# Patient Record
Sex: MALE | Race: WHITE | NOT HISPANIC OR LATINO | Employment: UNEMPLOYED | URBAN - NONMETROPOLITAN AREA
[De-identification: names, ages, dates, MRNs, and addresses within clinical notes are randomized per-mention and may not be internally consistent; named-entity substitution may affect disease eponyms.]

---

## 2017-11-30 ENCOUNTER — TRANSCRIBE ORDERS (OUTPATIENT)
Dept: ADMINISTRATIVE | Facility: HOSPITAL | Age: 42
End: 2017-11-30

## 2017-11-30 DIAGNOSIS — G89.29 CHRONIC BILATERAL LOW BACK PAIN WITH BILATERAL SCIATICA: Primary | ICD-10-CM

## 2017-11-30 DIAGNOSIS — M54.42 CHRONIC BILATERAL LOW BACK PAIN WITH BILATERAL SCIATICA: Primary | ICD-10-CM

## 2017-11-30 DIAGNOSIS — M54.41 CHRONIC BILATERAL LOW BACK PAIN WITH BILATERAL SCIATICA: Primary | ICD-10-CM

## 2017-12-05 ENCOUNTER — HOSPITAL ENCOUNTER (OUTPATIENT)
Dept: MRI IMAGING | Facility: HOSPITAL | Age: 42
Discharge: HOME | End: 2017-12-05
Admitting: NURSE PRACTITIONER

## 2017-12-05 DIAGNOSIS — M54.41 CHRONIC BILATERAL LOW BACK PAIN WITH BILATERAL SCIATICA: ICD-10-CM

## 2017-12-05 DIAGNOSIS — M54.42 CHRONIC BILATERAL LOW BACK PAIN WITH BILATERAL SCIATICA: ICD-10-CM

## 2017-12-05 DIAGNOSIS — G89.29 CHRONIC BILATERAL LOW BACK PAIN WITH BILATERAL SCIATICA: ICD-10-CM

## 2017-12-05 PROCEDURE — 72148 MRI LUMBAR SPINE W/O DYE: CPT

## 2017-12-14 ENCOUNTER — HOSPITAL ENCOUNTER (EMERGENCY)
Facility: HOSPITAL | Age: 42
Discharge: HOME | End: 2017-12-14
Attending: EMERGENCY MEDICINE | Admitting: EMERGENCY MEDICINE

## 2017-12-14 VITALS
DIASTOLIC BLOOD PRESSURE: 80 MMHG | BODY MASS INDEX: 37.25 KG/M2 | SYSTOLIC BLOOD PRESSURE: 120 MMHG | HEIGHT: 72 IN | TEMPERATURE: 98.1 F | WEIGHT: 275 LBS | HEART RATE: 92 BPM | RESPIRATION RATE: 14 BRPM | OXYGEN SATURATION: 95 %

## 2017-12-14 DIAGNOSIS — E11.9 NON-INSULIN DEPENDENT TYPE 2 DIABETES MELLITUS (HCC): ICD-10-CM

## 2017-12-14 DIAGNOSIS — L84 CALLUS OF FOOT: ICD-10-CM

## 2017-12-14 DIAGNOSIS — S91.102A UNSPECIFIED OPEN WOUND OF LEFT GREAT TOE WITHOUT DAMAGE TO NAIL, INITIAL ENCOUNTER: Primary | ICD-10-CM

## 2017-12-14 LAB — GLUCOSE BLDC GLUCOMTR-MCNC: 240 MG/DL (ref 70–130)

## 2017-12-14 PROCEDURE — 63710000001 INSULIN REGULAR HUMAN PER 5 UNITS: Performed by: PHYSICIAN ASSISTANT

## 2017-12-14 PROCEDURE — 99283 EMERGENCY DEPT VISIT LOW MDM: CPT

## 2017-12-14 PROCEDURE — 82962 GLUCOSE BLOOD TEST: CPT

## 2017-12-14 RX ORDER — ONDANSETRON 4 MG/1
4 TABLET, ORALLY DISINTEGRATING ORAL ONCE
Status: COMPLETED | OUTPATIENT
Start: 2017-12-14 | End: 2017-12-14

## 2017-12-14 RX ORDER — ONDANSETRON 4 MG/1
4 TABLET, ORALLY DISINTEGRATING ORAL EVERY 8 HOURS PRN
Qty: 8 TABLET | Refills: 0 | Status: SHIPPED | OUTPATIENT
Start: 2017-12-14 | End: 2019-04-25

## 2017-12-14 RX ORDER — SENNOSIDES 8.6 MG
650 CAPSULE ORAL EVERY 8 HOURS PRN
Qty: 12 TABLET | Refills: 0 | Status: SHIPPED | OUTPATIENT
Start: 2017-12-14 | End: 2019-04-25

## 2017-12-14 RX ORDER — CLINDAMYCIN HYDROCHLORIDE 150 MG/1
600 CAPSULE ORAL ONCE
Status: COMPLETED | OUTPATIENT
Start: 2017-12-14 | End: 2017-12-14

## 2017-12-14 RX ORDER — IBUPROFEN 600 MG/1
600 TABLET ORAL EVERY 8 HOURS PRN
Qty: 12 TABLET | Refills: 0 | Status: ON HOLD | OUTPATIENT
Start: 2017-12-14 | End: 2018-06-03

## 2017-12-14 RX ORDER — CLINDAMYCIN HYDROCHLORIDE 300 MG/1
300 CAPSULE ORAL 4 TIMES DAILY
Qty: 28 CAPSULE | Refills: 0 | Status: SHIPPED | OUTPATIENT
Start: 2017-12-14 | End: 2017-12-21

## 2017-12-14 RX ORDER — ACETAMINOPHEN 325 MG/1
650 TABLET ORAL ONCE
Status: COMPLETED | OUTPATIENT
Start: 2017-12-14 | End: 2017-12-14

## 2017-12-14 RX ADMIN — ONDANSETRON 4 MG: 4 TABLET, ORALLY DISINTEGRATING ORAL at 21:06

## 2017-12-14 RX ADMIN — HUMAN INSULIN 6 UNITS: 100 INJECTION, SOLUTION SUBCUTANEOUS at 21:06

## 2017-12-14 RX ADMIN — ACETAMINOPHEN 650 MG: 325 TABLET, FILM COATED ORAL at 21:06

## 2017-12-14 RX ADMIN — CLINDAMYCIN HYDROCHLORIDE 600 MG: 150 CAPSULE ORAL at 21:06

## 2017-12-15 NOTE — ED PROVIDER NOTES
Subjective   HPI Comments: Patient is here with complaint of skin irritation left great toe, patient has had some callus formation to the skin and he scraped it off last night with knife,.. Patient is diabetic he denies fevers chills patient has had similar symptoms to this extremity in the past and followed by podiatry who has trimmed some of the callus formation previously  Patient concerned about possible infection presents here for evaluation      Review of Systems   Constitutional: Negative.    HENT: Negative.    Eyes: Negative.    Respiratory: Negative.    Cardiovascular: Negative.    Gastrointestinal: Negative.    Musculoskeletal: Negative.    Skin: Positive for wound.   Neurological: Negative.    Psychiatric/Behavioral: Negative.    All other systems reviewed and are negative.      Past Medical History:   Diagnosis Date   • Depression    • Diabetes mellitus    • Hypertension    • Injury of back        No Known Allergies    Past Surgical History:   Procedure Laterality Date   • APPENDECTOMY     • BACK SURGERY     • CHOLECYSTECTOMY     • SCROTAL SURGERY         History reviewed. No pertinent family history.    Social History     Social History   • Marital status:      Spouse name: N/A   • Number of children: N/A   • Years of education: N/A     Social History Main Topics   • Smoking status: Current Every Day Smoker     Packs/day: 1.00     Types: Cigarettes   • Smokeless tobacco: None   • Alcohol use No   • Drug use: No   • Sexual activity: Not Asked     Other Topics Concern   • None     Social History Narrative   • None           Objective   Physical Exam   Constitutional: He is oriented to person, place, and time. He appears well-developed and well-nourished.   Afebrile nontoxic well-appearing no acute distress   HENT:   Head: Normocephalic.   Eyes: EOM are normal. Pupils are equal, round, and reactive to light.   Neck: Normal range of motion.   Cardiovascular: Regular rhythm and intact distal pulses.     Pulmonary/Chest: Effort normal and breath sounds normal.   Musculoskeletal: Normal range of motion. He exhibits no edema.   Neurological: He is alert and oriented to person, place, and time. No cranial nerve deficit. Coordination normal.   Skin: Skin is warm and dry.   Some localized callus to left great toe with abrasion epidermal layer approximately 2 cm no evidence of infection noted tonight no proximal streaking no edema.. Less than 2 second capillary refill patient moves extremity without hesitation neurovascularly intact   Psychiatric: His behavior is normal. Judgment and thought content normal.   Nursing note and vitals reviewed.      Procedures         ED Course  ED Course   Comment By Time   Have discussed with patient to keep track of his blood sugars,.. Adjust medications accordingly... Will advise to follow-up with podiatry start empiric antibiotics patient advised to return here if he develops fever redness streaking any worsening symptoms despite antibiotics Ethan Winter PA-C 12/14 2038                  [facility]    Final diagnoses:   Unspecified open wound of left great toe without damage to nail, initial encounter   Non-insulin dependent type 2 diabetes mellitus   Callus of foot            Ethan Winter PA-C  12/14/17 2221

## 2018-01-01 ENCOUNTER — APPOINTMENT (OUTPATIENT)
Dept: GENERAL RADIOLOGY | Facility: HOSPITAL | Age: 43
End: 2018-01-01

## 2018-01-01 ENCOUNTER — HOSPITAL ENCOUNTER (EMERGENCY)
Facility: HOSPITAL | Age: 43
Discharge: HOME | End: 2018-01-01
Attending: EMERGENCY MEDICINE | Admitting: EMERGENCY MEDICINE

## 2018-01-01 VITALS
BODY MASS INDEX: 37.25 KG/M2 | HEART RATE: 92 BPM | RESPIRATION RATE: 18 BRPM | HEIGHT: 72 IN | TEMPERATURE: 98.5 F | SYSTOLIC BLOOD PRESSURE: 115 MMHG | DIASTOLIC BLOOD PRESSURE: 78 MMHG | OXYGEN SATURATION: 98 % | WEIGHT: 275 LBS

## 2018-01-01 DIAGNOSIS — L03.032 CELLULITIS OF TOE OF LEFT FOOT: Primary | ICD-10-CM

## 2018-01-01 DIAGNOSIS — L03.116 CELLULITIS OF FOOT, LEFT: ICD-10-CM

## 2018-01-01 LAB — GLUCOSE BLDC GLUCOMTR-MCNC: 286 MG/DL (ref 70–130)

## 2018-01-01 PROCEDURE — 82962 GLUCOSE BLOOD TEST: CPT

## 2018-01-01 PROCEDURE — 99283 EMERGENCY DEPT VISIT LOW MDM: CPT

## 2018-01-01 PROCEDURE — 73630 X-RAY EXAM OF FOOT: CPT

## 2018-01-01 RX ORDER — FUROSEMIDE 40 MG/1
40 TABLET ORAL DAILY
COMMUNITY
End: 2018-06-07 | Stop reason: HOSPADM

## 2018-01-01 RX ORDER — OXYCODONE AND ACETAMINOPHEN 10; 325 MG/1; MG/1
1 TABLET ORAL EVERY 6 HOURS PRN
Status: ON HOLD | COMMUNITY
End: 2018-06-04

## 2018-01-01 RX ORDER — BUPROPION HYDROCHLORIDE 150 MG/1
150 TABLET, EXTENDED RELEASE ORAL DAILY
Status: ON HOLD | COMMUNITY
End: 2018-06-03

## 2018-01-01 RX ORDER — DULOXETIN HYDROCHLORIDE 60 MG/1
60 CAPSULE, DELAYED RELEASE ORAL DAILY
COMMUNITY

## 2018-01-01 RX ORDER — LISINOPRIL 40 MG/1
40 TABLET ORAL DAILY
COMMUNITY

## 2018-01-01 RX ORDER — DOXYCYCLINE HYCLATE 100 MG/1
100 TABLET, DELAYED RELEASE ORAL 2 TIMES DAILY
Qty: 14 TABLET | Refills: 0 | Status: SHIPPED | OUTPATIENT
Start: 2018-01-01 | End: 2018-01-06 | Stop reason: HOSPADM

## 2018-01-01 RX ORDER — GLIMEPIRIDE 4 MG/1
1 TABLET ORAL 2 TIMES DAILY
COMMUNITY

## 2018-01-01 RX ORDER — HYDROCODONE BITARTRATE AND ACETAMINOPHEN 5; 325 MG/1; MG/1
1 TABLET ORAL ONCE
Status: COMPLETED | OUTPATIENT
Start: 2018-01-01 | End: 2018-01-01

## 2018-01-01 RX ORDER — PREGABALIN 100 MG/1
150 CAPSULE ORAL 3 TIMES DAILY
Status: ON HOLD | COMMUNITY
End: 2018-06-03

## 2018-01-01 RX ADMIN — HYDROCODONE BITARTRATE AND ACETAMINOPHEN 1 TABLET: 5; 325 TABLET ORAL at 10:35

## 2018-01-01 NOTE — ED PROVIDER NOTES
Subjective   Patient is a 42 y.o. male presenting with lower extremity pain.   History provided by:  Patient   used: No    Lower Extremity Issue   Location:  Foot  Injury: no    Foot location:  R foot  Pain details:     Quality:  Throbbing    Radiates to:  R leg    Severity:  Mild    Onset quality:  Sudden    Timing:  Intermittent    Progression:  Worsening  Chronicity:  New  Dislocation: no    Foreign body present:  No foreign bodies  Tetanus status:  Up to date  Prior injury to area:  No  Relieved by:  Nothing  Worsened by:  Nothing  Ineffective treatments:  None tried  Associated symptoms: swelling    Associated symptoms: no back pain, no fever and no neck pain        Review of Systems   Constitutional: Negative for chills and fever.   HENT: Negative for congestion, rhinorrhea, sore throat and trouble swallowing.    Eyes: Negative for discharge and visual disturbance.   Respiratory: Negative for cough, chest tightness, shortness of breath and wheezing.    Cardiovascular: Negative for chest pain, palpitations and leg swelling.   Gastrointestinal: Negative for abdominal pain, constipation, diarrhea, nausea and vomiting.   Genitourinary: Negative for dysuria, flank pain and hematuria.   Musculoskeletal: Negative for back pain, myalgias and neck pain.   Skin: Positive for wound. Negative for color change and rash.   Neurological: Negative for dizziness, weakness and numbness.   Psychiatric/Behavioral: Negative for self-injury and suicidal ideas.       Past Medical History:   Diagnosis Date   • Depression    • Diabetes mellitus    • Hypertension    • Injury of back        No Known Allergies    Past Surgical History:   Procedure Laterality Date   • APPENDECTOMY     • BACK SURGERY     • CHOLECYSTECTOMY     • SCROTAL SURGERY         History reviewed. No pertinent family history.    Social History     Social History   • Marital status:      Spouse name: N/A   • Number of children: N/A   • Years  of education: N/A     Social History Main Topics   • Smoking status: Current Every Day Smoker     Packs/day: 1.00     Types: Cigarettes   • Smokeless tobacco: None   • Alcohol use No   • Drug use: No   • Sexual activity: Not Asked     Other Topics Concern   • None     Social History Narrative           Objective   Physical Exam   Constitutional: He is oriented to person, place, and time. He appears well-developed and well-nourished.   HENT:   Head: Normocephalic and atraumatic.   Nose: Nose normal.   Mouth/Throat: Oropharynx is clear and moist.   Eyes: Conjunctivae and EOM are normal. Pupils are equal, round, and reactive to light.   Neck: Normal range of motion. Neck supple.   Cardiovascular: Normal rate, regular rhythm, normal heart sounds and intact distal pulses.    Pulmonary/Chest: Effort normal and breath sounds normal. No respiratory distress. He has no wheezes. He exhibits no tenderness.   Abdominal: Soft. Bowel sounds are normal. There is no tenderness. There is no rebound and no guarding.   Musculoskeletal: Normal range of motion. He exhibits no edema, tenderness or deformity.   Neurological: He is alert and oriented to person, place, and time. No cranial nerve deficit. Coordination normal.   Skin: Skin is warm and dry. No rash noted. No erythema. No pallor.   Cellulitis to the L big toe, L lateral foot, and L medial malleolus.   Psychiatric: He has a normal mood and affect. His behavior is normal. Judgment and thought content normal.   Nursing note and vitals reviewed.      Procedures         ED Course  ED Course                  MDM  Number of Diagnoses or Management Options  Cellulitis of foot, left:   Cellulitis of toe of left foot:   Diagnosis management comments: 42-year-old male presents emergency Department with complaints of cellulitis.  Patient states that he had a callus to his right big toe that he cut off and became infected.  He was seen in the emergency department a month ago and prescribed  antibiotics.  Patient states that initially got better and now it's looking infected again.  He states that this morning he woke up and noticed redness to his toe going to his foot and ankle.  On evaluation, patient has cellulitis to the L big toe and lateral left foot and medial malleolus. X-ray obtained.  Soft tissue swelling seen but no signs of osteomyelitis.  I will start the patient on doxycycline.  I will give the patient a referral to podiatry.  I told the patient to watch the erythema.  I told them that if the erythema continues to worsen to come back to the emergency department for admission.  Patient is agreeable with the plan of care.      Final diagnoses:   Cellulitis of toe of left foot   Cellulitis of foot, left            Steven Martínez MD  01/01/18 1123       Steven Martínez MD  01/20/18 0548

## 2018-01-02 ENCOUNTER — APPOINTMENT (OUTPATIENT)
Dept: MRI IMAGING | Facility: HOSPITAL | Age: 43
End: 2018-01-02

## 2018-01-02 ENCOUNTER — APPOINTMENT (OUTPATIENT)
Dept: ULTRASOUND IMAGING | Facility: HOSPITAL | Age: 43
End: 2018-01-02

## 2018-01-02 ENCOUNTER — HOSPITAL ENCOUNTER (INPATIENT)
Facility: HOSPITAL | Age: 43
LOS: 3 days | Discharge: HOME | End: 2018-01-06
Attending: STUDENT IN AN ORGANIZED HEALTH CARE EDUCATION/TRAINING PROGRAM | Admitting: INTERNAL MEDICINE

## 2018-01-02 DIAGNOSIS — E11.621 DIABETIC ULCER OF TOE OF LEFT FOOT ASSOCIATED WITH TYPE 2 DIABETES MELLITUS, UNSPECIFIED ULCER STAGE (HCC): Primary | ICD-10-CM

## 2018-01-02 DIAGNOSIS — L97.529 DIABETIC ULCER OF TOE OF LEFT FOOT ASSOCIATED WITH TYPE 2 DIABETES MELLITUS, UNSPECIFIED ULCER STAGE (HCC): Primary | ICD-10-CM

## 2018-01-02 DIAGNOSIS — L03.116 CELLULITIS OF LEFT LOWER EXTREMITY: ICD-10-CM

## 2018-01-02 LAB
ALBUMIN SERPL-MCNC: 4.3 G/DL (ref 3.5–5)
ALBUMIN/GLOB SERPL: 1.3 G/DL (ref 1–2)
ALP SERPL-CCNC: 116 U/L (ref 38–126)
ALT SERPL W P-5'-P-CCNC: 170 U/L (ref 13–69)
ANION GAP SERPL CALCULATED.3IONS-SCNC: 14.1 MMOL/L
AST SERPL-CCNC: 71 U/L (ref 15–46)
BASOPHILS # BLD AUTO: 0.07 10*3/MM3 (ref 0–0.2)
BASOPHILS NFR BLD AUTO: 0.5 % (ref 0–2.5)
BILIRUB SERPL-MCNC: 0.7 MG/DL (ref 0.2–1.3)
BUN BLD-MCNC: 13 MG/DL (ref 7–20)
BUN/CREAT SERPL: 16.3 (ref 6.3–21.9)
CALCIUM SPEC-SCNC: 9.4 MG/DL (ref 8.4–10.2)
CHLORIDE SERPL-SCNC: 98 MMOL/L (ref 98–107)
CO2 SERPL-SCNC: 25 MMOL/L (ref 26–30)
CREAT BLD-MCNC: 0.8 MG/DL (ref 0.6–1.3)
CRP SERPL-MCNC: 21.8 MG/DL (ref 0–1)
DEPRECATED RDW RBC AUTO: 40.2 FL (ref 37–54)
EOSINOPHIL # BLD AUTO: 0.22 10*3/MM3 (ref 0–0.7)
EOSINOPHIL NFR BLD AUTO: 1.6 % (ref 0–7)
ERYTHROCYTE [DISTWIDTH] IN BLOOD BY AUTOMATED COUNT: 12.2 % (ref 11.5–14.5)
ERYTHROCYTE [SEDIMENTATION RATE] IN BLOOD: 14 MM/HR (ref 0–15)
GFR SERPL CREATININE-BSD FRML MDRD: 107 ML/MIN/1.73
GLOBULIN UR ELPH-MCNC: 3.2 GM/DL
GLUCOSE BLD-MCNC: 390 MG/DL (ref 74–98)
GLUCOSE BLDC GLUCOMTR-MCNC: 151 MG/DL (ref 70–130)
GLUCOSE BLDC GLUCOMTR-MCNC: 323 MG/DL (ref 70–130)
HBA1C MFR BLD: 9.7 % (ref 3–6)
HCT VFR BLD AUTO: 48.8 % (ref 42–52)
HGB BLD-MCNC: 16.7 G/DL (ref 14–18)
HOLD SPECIMEN: NORMAL
HOLD SPECIMEN: NORMAL
IMM GRANULOCYTES # BLD: 0.04 10*3/MM3 (ref 0–0.06)
IMM GRANULOCYTES NFR BLD: 0.3 % (ref 0–0.6)
LYMPHOCYTES # BLD AUTO: 2.11 10*3/MM3 (ref 0.6–3.4)
LYMPHOCYTES NFR BLD AUTO: 15.6 % (ref 10–50)
MCH RBC QN AUTO: 30.9 PG (ref 27–31)
MCHC RBC AUTO-ENTMCNC: 34.2 G/DL (ref 30–37)
MCV RBC AUTO: 90.2 FL (ref 80–94)
MONOCYTES # BLD AUTO: 0.97 10*3/MM3 (ref 0–0.9)
MONOCYTES NFR BLD AUTO: 7.2 % (ref 0–12)
NEUTROPHILS # BLD AUTO: 10.13 10*3/MM3 (ref 2–6.9)
NEUTROPHILS NFR BLD AUTO: 74.8 % (ref 37–80)
NRBC BLD MANUAL-RTO: 0 /100 WBC (ref 0–0)
PLATELET # BLD AUTO: 215 10*3/MM3 (ref 130–400)
PMV BLD AUTO: 10 FL (ref 6–12)
POTASSIUM BLD-SCNC: 4.1 MMOL/L (ref 3.5–5.1)
PROT SERPL-MCNC: 7.5 G/DL (ref 6.3–8.2)
RBC # BLD AUTO: 5.41 10*6/MM3 (ref 4.7–6.1)
SODIUM BLD-SCNC: 133 MMOL/L (ref 137–145)
WBC NRBC COR # BLD: 13.54 10*3/MM3 (ref 4.8–10.8)
WHOLE BLOOD HOLD SPECIMEN: NORMAL
WHOLE BLOOD HOLD SPECIMEN: NORMAL

## 2018-01-02 PROCEDURE — 87081 CULTURE SCREEN ONLY: CPT | Performed by: INTERNAL MEDICINE

## 2018-01-02 PROCEDURE — 99222 1ST HOSP IP/OBS MODERATE 55: CPT | Performed by: PODIATRIST

## 2018-01-02 PROCEDURE — 86704 HEP B CORE ANTIBODY TOTAL: CPT | Performed by: INTERNAL MEDICINE

## 2018-01-02 PROCEDURE — 36415 COLL VENOUS BLD VENIPUNCTURE: CPT

## 2018-01-02 PROCEDURE — G0378 HOSPITAL OBSERVATION PER HR: HCPCS

## 2018-01-02 PROCEDURE — 86140 C-REACTIVE PROTEIN: CPT | Performed by: PHYSICIAN ASSISTANT

## 2018-01-02 PROCEDURE — 87181 SC STD AGAR DILUTION PER AGT: CPT | Performed by: INTERNAL MEDICINE

## 2018-01-02 PROCEDURE — 86707 HEPATITIS BE ANTIBODY: CPT | Performed by: INTERNAL MEDICINE

## 2018-01-02 PROCEDURE — 73718 MRI LOWER EXTREMITY W/O DYE: CPT

## 2018-01-02 PROCEDURE — 83036 HEMOGLOBIN GLYCOSYLATED A1C: CPT | Performed by: INTERNAL MEDICINE

## 2018-01-02 PROCEDURE — 82962 GLUCOSE BLOOD TEST: CPT

## 2018-01-02 PROCEDURE — 93005 ELECTROCARDIOGRAM TRACING: CPT | Performed by: INTERNAL MEDICINE

## 2018-01-02 PROCEDURE — 87070 CULTURE OTHR SPECIMN AEROBIC: CPT | Performed by: INTERNAL MEDICINE

## 2018-01-02 PROCEDURE — 63710000001 INSULIN ASPART PER 5 UNITS: Performed by: INTERNAL MEDICINE

## 2018-01-02 PROCEDURE — 85025 COMPLETE CBC W/AUTO DIFF WBC: CPT | Performed by: PHYSICIAN ASSISTANT

## 2018-01-02 PROCEDURE — 87186 SC STD MICRODIL/AGAR DIL: CPT | Performed by: INTERNAL MEDICINE

## 2018-01-02 PROCEDURE — 80074 ACUTE HEPATITIS PANEL: CPT | Performed by: INTERNAL MEDICINE

## 2018-01-02 PROCEDURE — 87077 CULTURE AEROBIC IDENTIFY: CPT | Performed by: INTERNAL MEDICINE

## 2018-01-02 PROCEDURE — 63710000001 INSULIN REGULAR HUMAN PER 5 UNITS: Performed by: PHYSICIAN ASSISTANT

## 2018-01-02 PROCEDURE — 25010000002 VANCOMYCIN PER 500 MG: Performed by: PHYSICIAN ASSISTANT

## 2018-01-02 PROCEDURE — 99220 PR INITIAL OBSERVATION CARE/DAY 70 MINUTES: CPT | Performed by: INTERNAL MEDICINE

## 2018-01-02 PROCEDURE — 25010000002 PIPERACILLIN SOD-TAZOBACTAM PER 1 G: Performed by: INTERNAL MEDICINE

## 2018-01-02 PROCEDURE — 25010000002 PIPERACILLIN SOD-TAZOBACTAM PER 1 G: Performed by: PHYSICIAN ASSISTANT

## 2018-01-02 PROCEDURE — 86706 HEP B SURFACE ANTIBODY: CPT | Performed by: INTERNAL MEDICINE

## 2018-01-02 PROCEDURE — 87350 HEPATITIS BE AG IA: CPT | Performed by: INTERNAL MEDICINE

## 2018-01-02 PROCEDURE — 87147 CULTURE TYPE IMMUNOLOGIC: CPT | Performed by: INTERNAL MEDICINE

## 2018-01-02 PROCEDURE — 85651 RBC SED RATE NONAUTOMATED: CPT | Performed by: PHYSICIAN ASSISTANT

## 2018-01-02 PROCEDURE — 80053 COMPREHEN METABOLIC PANEL: CPT | Performed by: PHYSICIAN ASSISTANT

## 2018-01-02 PROCEDURE — 25010000002 ENOXAPARIN PER 10 MG: Performed by: INTERNAL MEDICINE

## 2018-01-02 PROCEDURE — 99284 EMERGENCY DEPT VISIT MOD MDM: CPT

## 2018-01-02 RX ORDER — LISINOPRIL 20 MG/1
40 TABLET ORAL DAILY
Status: DISCONTINUED | OUTPATIENT
Start: 2018-01-02 | End: 2018-01-06 | Stop reason: HOSPADM

## 2018-01-02 RX ORDER — SODIUM CHLORIDE 0.9 % (FLUSH) 0.9 %
1-10 SYRINGE (ML) INJECTION AS NEEDED
Status: DISCONTINUED | OUTPATIENT
Start: 2018-01-02 | End: 2018-01-06 | Stop reason: HOSPADM

## 2018-01-02 RX ORDER — PREGABALIN 75 MG/1
150 CAPSULE ORAL 3 TIMES DAILY
Status: DISCONTINUED | OUTPATIENT
Start: 2018-01-02 | End: 2018-01-06 | Stop reason: HOSPADM

## 2018-01-02 RX ORDER — BUPROPION HYDROCHLORIDE 150 MG/1
150 TABLET, EXTENDED RELEASE ORAL DAILY
Status: DISCONTINUED | OUTPATIENT
Start: 2018-01-03 | End: 2018-01-06 | Stop reason: HOSPADM

## 2018-01-02 RX ORDER — ONDANSETRON 4 MG/1
4 TABLET, ORALLY DISINTEGRATING ORAL EVERY 8 HOURS PRN
Status: DISCONTINUED | OUTPATIENT
Start: 2018-01-02 | End: 2018-01-06 | Stop reason: HOSPADM

## 2018-01-02 RX ORDER — ACETAMINOPHEN 325 MG/1
650 TABLET ORAL EVERY 6 HOURS PRN
Status: DISCONTINUED | OUTPATIENT
Start: 2018-01-02 | End: 2018-01-06 | Stop reason: HOSPADM

## 2018-01-02 RX ORDER — OXYCODONE AND ACETAMINOPHEN 10; 325 MG/1; MG/1
1 TABLET ORAL EVERY 4 HOURS PRN
Status: DISCONTINUED | OUTPATIENT
Start: 2018-01-02 | End: 2018-01-06 | Stop reason: HOSPADM

## 2018-01-02 RX ORDER — DULOXETIN HYDROCHLORIDE 30 MG/1
60 CAPSULE, DELAYED RELEASE ORAL DAILY
Status: DISCONTINUED | OUTPATIENT
Start: 2018-01-03 | End: 2018-01-06 | Stop reason: HOSPADM

## 2018-01-02 RX ORDER — HYDROCODONE BITARTRATE AND ACETAMINOPHEN 5; 325 MG/1; MG/1
1 TABLET ORAL ONCE
Status: COMPLETED | OUTPATIENT
Start: 2018-01-02 | End: 2018-01-02

## 2018-01-02 RX ORDER — SODIUM CHLORIDE, SODIUM LACTATE, POTASSIUM CHLORIDE, CALCIUM CHLORIDE 600; 310; 30; 20 MG/100ML; MG/100ML; MG/100ML; MG/100ML
100 INJECTION, SOLUTION INTRAVENOUS CONTINUOUS
Status: CANCELLED | OUTPATIENT
Start: 2018-01-02

## 2018-01-02 RX ORDER — SODIUM CHLORIDE 0.9 % (FLUSH) 0.9 %
1-10 SYRINGE (ML) INJECTION AS NEEDED
Status: CANCELLED | OUTPATIENT
Start: 2018-01-02

## 2018-01-02 RX ORDER — GLIPIZIDE 10 MG/1
10 TABLET ORAL
Status: DISCONTINUED | OUTPATIENT
Start: 2018-01-03 | End: 2018-01-06 | Stop reason: HOSPADM

## 2018-01-02 RX ORDER — DEXTROSE MONOHYDRATE 25 G/50ML
25 INJECTION, SOLUTION INTRAVENOUS
Status: DISCONTINUED | OUTPATIENT
Start: 2018-01-02 | End: 2018-01-06 | Stop reason: HOSPADM

## 2018-01-02 RX ORDER — NICOTINE POLACRILEX 4 MG
1 LOZENGE BUCCAL
Status: DISCONTINUED | OUTPATIENT
Start: 2018-01-02 | End: 2018-01-06 | Stop reason: HOSPADM

## 2018-01-02 RX ORDER — ONDANSETRON 2 MG/ML
4 INJECTION INTRAMUSCULAR; INTRAVENOUS EVERY 6 HOURS PRN
Status: DISCONTINUED | OUTPATIENT
Start: 2018-01-02 | End: 2018-01-06 | Stop reason: HOSPADM

## 2018-01-02 RX ORDER — FUROSEMIDE 40 MG/1
40 TABLET ORAL DAILY
Status: DISCONTINUED | OUTPATIENT
Start: 2018-01-02 | End: 2018-01-06 | Stop reason: HOSPADM

## 2018-01-02 RX ORDER — SODIUM CHLORIDE 0.9 % (FLUSH) 0.9 %
10 SYRINGE (ML) INJECTION AS NEEDED
Status: DISCONTINUED | OUTPATIENT
Start: 2018-01-02 | End: 2018-01-06 | Stop reason: HOSPADM

## 2018-01-02 RX ADMIN — METFORMIN HYDROCHLORIDE 500 MG: 500 TABLET, FILM COATED ORAL at 17:33

## 2018-01-02 RX ADMIN — PREGABALIN 150 MG: 75 CAPSULE ORAL at 21:09

## 2018-01-02 RX ADMIN — HUMAN INSULIN 5 UNITS: 100 INJECTION, SOLUTION SUBCUTANEOUS at 11:49

## 2018-01-02 RX ADMIN — ENOXAPARIN SODIUM 40 MG: 40 INJECTION SUBCUTANEOUS at 15:20

## 2018-01-02 RX ADMIN — SODIUM CHLORIDE 1000 ML: 9 INJECTION, SOLUTION INTRAVENOUS at 10:58

## 2018-01-02 RX ADMIN — TAZOBACTAM SODIUM AND PIPERACILLIN SODIUM 4.5 G: 500; 4 INJECTION, SOLUTION INTRAVENOUS at 10:58

## 2018-01-02 RX ADMIN — Medication 10 ML: at 11:49

## 2018-01-02 RX ADMIN — INSULIN ASPART 10 UNITS: 100 INJECTION, SOLUTION INTRAVENOUS; SUBCUTANEOUS at 21:09

## 2018-01-02 RX ADMIN — HYDROCODONE BITARTRATE AND ACETAMINOPHEN 1 TABLET: 5; 325 TABLET ORAL at 11:49

## 2018-01-02 RX ADMIN — FUROSEMIDE 40 MG: 40 TABLET ORAL at 15:20

## 2018-01-02 RX ADMIN — OXYCODONE HYDROCHLORIDE AND ACETAMINOPHEN 1 TABLET: 10; 325 TABLET ORAL at 15:20

## 2018-01-02 RX ADMIN — PREGABALIN 150 MG: 75 CAPSULE ORAL at 15:20

## 2018-01-02 RX ADMIN — TAZOBACTAM SODIUM AND PIPERACILLIN SODIUM 3.38 G: 375; 3 INJECTION, SOLUTION INTRAVENOUS at 22:25

## 2018-01-02 RX ADMIN — VANCOMYCIN HYDROCHLORIDE 2000 MG: 500 INJECTION, POWDER, LYOPHILIZED, FOR SOLUTION INTRAVENOUS at 11:54

## 2018-01-02 RX ADMIN — TAZOBACTAM SODIUM AND PIPERACILLIN SODIUM 3.38 G: 375; 3 INJECTION, SOLUTION INTRAVENOUS at 17:34

## 2018-01-02 RX ADMIN — OXYCODONE HYDROCHLORIDE AND ACETAMINOPHEN 1 TABLET: 10; 325 TABLET ORAL at 21:09

## 2018-01-02 RX ADMIN — LISINOPRIL 40 MG: 20 TABLET ORAL at 15:20

## 2018-01-02 NOTE — PHARMACY RECOMMENDATION
"Pharmacokinetic Initial Note - Vancomycin    Pharmacy was consulted to dose vancomycin for  Wai Medeiros, a 42 y.o. male  182.9 cm (72\") 125 kg (275 lb)    Indication for use: Skin and soft tissue infection      Results from last 7 days     Lab Units 01/02/18  0945   WBC 10*3/mm3 13.54*   CREATININE mg/dL 0.80      Estimated Creatinine Clearance: 167.7 mL/min (by C-G formula based on Cr of 0.8).  Temp Readings from Last 1 Encounters:   01/02/18 98 °F (36.7 °C) (Oral)       Other Antimicrobials  Zosyn 3.375 g IV q6h (conventional dosing)    Assessment/Plan  Initiated Vancomycin 2000 mg (16 mg/kg) IV once, followed by 2000 mg IV every 12 hours. Will order Vancomycin trough at steady state on 1/4/18. Pharmacy will monitor renal function and adjust dose accordingly.    Balbina Martins, PharmD, BCPS  01/02/18 3:10 PM    "

## 2018-01-02 NOTE — ED PROVIDER NOTES
Subjective   HPI Comments: 42 yr old male  presents to the emergency department with a diabetic foot ulcer on his left great toe.  He was initially seen in the emergency department on December 14 after he cut off a callus at home with a razor blade.  He was started on antibiotics and he states that the wound did not get worse or better just continued to linger.  Return to the emergency department yesterday received a second round of antibiotics and was told to come back if worse, he states over the last 24 hours his wound has gotten worse, there is increased redness and dark tissue as well as redness spreading up the foot.      History provided by:  Patient   used: No        Review of Systems   Skin:        Left great toe diabetic wound   All other systems reviewed and are negative.      Past Medical History:   Diagnosis Date   • Depression    • Diabetes mellitus    • Hypertension    • Injury of back        No Known Allergies    Past Surgical History:   Procedure Laterality Date   • APPENDECTOMY     • BACK SURGERY     • CHOLECYSTECTOMY     • SCROTAL SURGERY         History reviewed. No pertinent family history.    Social History     Social History   • Marital status:      Spouse name: N/A   • Number of children: N/A   • Years of education: N/A     Social History Main Topics   • Smoking status: Current Every Day Smoker     Packs/day: 1.00     Types: Cigarettes   • Smokeless tobacco: None   • Alcohol use No   • Drug use: No   • Sexual activity: Not Asked     Other Topics Concern   • None     Social History Narrative           Objective   Physical Exam   Constitutional: He is oriented to person, place, and time. He appears well-developed and well-nourished.   HENT:   Head: Normocephalic and atraumatic.   Eyes: EOM are normal. Pupils are equal, round, and reactive to light.   Neck: Normal range of motion.   Cardiovascular: Normal rate and regular rhythm.    Pulmonary/Chest: Effort normal and  breath sounds normal.   Abdominal: Soft. Bowel sounds are normal.   Musculoskeletal: Normal range of motion.   Neurological: He is alert and oriented to person, place, and time.   Skin:   Left great toe medial diabetic foot ulcer.  2 cm with erythematous cellulitis of the great toe extending up to the mid foot.  Necrotic tissue on the edges of the 2 cm wound with sloughing within the middle of the wound   Psychiatric: He has a normal mood and affect. His behavior is normal. Judgment and thought content normal.   Nursing note and vitals reviewed.      Procedures         ED Course  ED Course   Comment By Time   discussed with Dr. Antoine he recommended adding blanked Zosyn, or tenderness sedimentation rate and a CRP.  Admitted to the hospitalist for IV antibiotics and possible debridement Sundeep Figueroa Jr., PA-C 01/02 1133   discussed with Dr. Caruso he agreed to accept the admission Medr floor Sundeep Figueroa Jr., PA-C 01/02 1138                  MDM    Final diagnoses:   Diabetic ulcer of toe of left foot associated with type 2 diabetes mellitus, unspecified ulcer stage   Cellulitis of left lower extremity            Sundeep Figueroa Jr., PA-C  01/02/18 1140

## 2018-01-02 NOTE — ED NOTES
Dr. Gomez was called at 1137, the call was transferred to [facility], at this time.      Alexx Alexis  01/02/18 1141

## 2018-01-02 NOTE — H&P
[facility]   HISTORY AND PHYSICAL      Name:  Wai Medeiros   Age:  42 y.o.  Sex:  male  :  1975  MRN:  [id number]   Visit Number:  [id number]  Admission Date:  2018  Date Of Service:  18  Primary Care Physician:  NEHEMIAH Parra    History Obtained From:    patient    Chief Complaint:     Left great toe infection     History Of Presenting Illness:      This is a 42-year-old  male with history diabetes type 2, hypertension, depression, chronic low back pain who one month ago, the callus off of his left great toe.  It developed swelling in mid December.  He came to the emergency room was given amoxicillin.  This did not help area and he came back yesterday as he had increased swelling, he was placed on doxycycline and told to have close follow-up if he did not improve.  He now returns today with increased swelling, and redness was is advancing up his leg on the left.  He also has significant pustular drainage from the great toe.  He denies any nausea, vomiting, diarrhea.  He does not have any chest pressure or shortness of breath.  He does have fever and chills.  He has 4/10 pain in the left lower extremity.  He does have a history of MRSA in the past.  He had necrotizing fasciitis which was operated on by urology.  His white count is elevated at 13.54.  His CRP is 21.80.  X-ray of the foot was negative.  AST and ALT are also elevated.  Dr. Antoine has been contacted by the emergency room.    Review Of Systems:     General ROS: positive for  - chills and fever  Psychological ROS: negative  Ophthalmic ROS: negative  ENT ROS: negative  Allergy and Immunology ROS: negative  Hematological and Lymphatic ROS: negative  Endocrine ROS: negative  Breast ROS: negative  Respiratory ROS: negative  Cardiovascular ROS: negative  Gastrointestinal ROS: negative  Genito-Urinary ROS: negative  Musculoskeletal ROS: positive for - swelling in foot -  left  Neurological ROS: negative  Dermatological ROS: positive for skin lesion changes  Left foot     Past Medical History:    Diabetes type 2, hypertension, depression, chronic low back pain, necrotizing fasciitis of the groin area    Past Surgical history:    Appendectomy, back surgery, cholecystectomy, scrotal surgery      Social History:        Per day smoker for many years, denies alcohol or drugs.  He is on disability.  He is .    Family History:    Reviewed and noncontributory.    Allergies:      Review of patient's allergies indicates no known allergies.    Home Medications:    Prior to Admission Medications     Prescriptions Last Dose Informant Patient Reported? Taking?    buPROPion SR (WELLBUTRIN SR) 150 MG 12 hr tablet 1/2/2018  Yes Yes    Take 150 mg by mouth Daily.    Canagliflozin (INVOKANA) 300 MG tablet 1/2/2018  Yes Yes    Take 300 mg by mouth Daily.    doxycycline (DORYX) 100 MG enteric coated tablet 1/1/2018  No Yes    Take 1 tablet by mouth 2 (Two) Times a Day for 10 days.    DULoxetine (CYMBALTA) 60 MG capsule 1/2/2018  Yes Yes    Take 60 mg by mouth Daily.    furosemide (LASIX) 40 MG tablet 1/1/2018  Yes Yes    Take 40 mg by mouth Daily.    glimepiride (AMARYL) 4 MG tablet 1/1/2018  Yes Yes    Take 8 mg by mouth Every Morning Before Breakfast.    ibuprofen (ADVIL,MOTRIN) 600 MG tablet Past Week  No Yes    Take 1 tablet by mouth Every 8 (Eight) Hours As Needed for Mild Pain .    Insulin Glargine (TOUJEO SOLOSTAR SC) 1/1/2018  Yes Yes    Inject 10 Units under the skin Daily.    Insulin NPH Isophane & Regular (NOVOLIN 70/30 SC) 1/1/2018  Yes Yes    Inject 20 Units under the skin 3 (Three) Times a Day Before Meals.    lisinopril (PRINIVIL,ZESTRIL) 40 MG tablet 1/1/2018  Yes Yes    Take 40 mg by mouth Daily.    metFORMIN (GLUCOPHAGE) 500 MG tablet 1/2/2018  Yes Yes    Take 500 mg by mouth 2 (Two) Times a Day With Meals.    oxyCODONE-acetaminophen (PERCOCET)  MG per tablet 1/1/2018  Yes  Yes    Take 1 tablet by mouth Every 6 (Six) Hours As Needed for Moderate Pain .    pregabalin (LYRICA) 100 MG capsule 1/1/2018  Yes Yes    Take 150 mg by mouth 3 (Three) Times a Day.    acetaminophen (TYLENOL 8 HOUR) 650 MG 8 hr tablet   No No    Take 1 tablet by mouth Every 8 (Eight) Hours As Needed for Mild Pain .    ondansetron ODT (ZOFRAN-ODT) 4 MG disintegrating tablet Unknown  No No    Take 1 tablet by mouth Every 8 (Eight) Hours As Needed for Nausea or Vomiting.             Hospital Scheduled Meds:      [START ON 1/3/2018] buPROPion  mg Oral Daily   [START ON 1/3/2018] DULoxetine 60 mg Oral Daily   enoxaparin 40 mg Subcutaneous Q24H   furosemide 40 mg Oral Daily   [START ON 1/3/2018] glipiZIDE 10 mg Oral QAM AC   insulin aspart 0-14 Units Subcutaneous 4x Daily AC & at Bedtime   lisinopril 40 mg Oral Daily   metFORMIN 500 mg Oral BID With Meals   pregabalin 150 mg Oral TID         Pharmacy to dose vancomycin    Pharmacy to Dose Zosyn         Vital Signs:    Temp:  [98 °F (36.7 °C)-98.1 °F (36.7 °C)] 98 °F (36.7 °C)  Heart Rate:  [] 95  Resp:  [15-18] 18  BP: (109-132)/(67-85) 109/67    Last 3 weights    01/02/18  0926   Weight: 125 kg (275 lb)       Body mass index is 37.3 kg/(m^2).    Physical Exam:      General Appearance:    Alert, cooperative, in no acute distress   Head:    Normocephalic, without obvious abnormality, atraumatic   Eyes:            Lids and lashes normal, conjunctivae and sclerae normal, no   icterus, no pallor, corneas clear, PERRLA   Ears:    Ears appear intact with no abnormalities noted   Throat:   No oral lesions, no thrush, oral mucosa moist   Neck:   No adenopathy, supple, trachea midline, no thyromegaly, no   carotid bruit, no JVD   Back:     No kyphosis present, no scoliosis present, no skin lesions,      erythema or scars, no tenderness to percussion or                   palpation,   range of motion normal   Lungs:     Clear to auscultation,respirations regular, even  and                  unlabored    Heart:    Regular rhythm and normal rate, normal S1 and S2, no            murmur, no gallop, no rub, no click   Chest Wall:    No abnormalities observed   Abdomen:     Normal bowel sounds, no masses, no organomegaly, soft        non-tender, non-distended, no guarding, no rebound                tenderness   Rectal:     Deferred   Extremities:   Moves all extremities well, no edema, no cyanosis, no             redness   Pulses:   Pulses palpable and equal bilaterally   Skin:   Left foot is swollen and red, with streaking advancing up the leg.  There is significant swelling in the left great toe with lesion on the front of the toe which is draining pus.     Lymph nodes:   No palpable adenopathy   Neurologic:   Cranial nerves 2 - 12 grossly intact, sensation intact, DTR       present and equal bilaterally           Labs:      Results from last 7 days  Lab Units 01/02/18  0945   CRP mg/dL 21.80*   WBC 10*3/mm3 13.54*   HEMOGLOBIN g/dL 16.7   HEMATOCRIT % 48.8   MCV fL 90.2   MCHC g/dL 34.2   PLATELETS 10*3/mm3 215           Results from last 7 days  Lab Units 01/02/18  0945   SODIUM mmol/L 133*   POTASSIUM mmol/L 4.1   CHLORIDE mmol/L 98   CO2 mmol/L 25.0*   BUN mg/dL 13   CREATININE mg/dL 0.80   EGFR IF NONAFRICN AM mL/min/1.73 107   CALCIUM mg/dL 9.4   GLUCOSE mg/dL 390*   ALBUMIN g/dL 4.30   BILIRUBIN mg/dL 0.7   ALK PHOS U/L 116   AST (SGOT) U/L 71*   ALT (SGPT) U/L 170*   Estimated Creatinine Clearance: 167.7 mL/min (by C-G formula based on Cr of 0.8).  No results found for: AMMONIA          Lab Results   Component Value Date    HGBA1C 11.2 (H) 01/06/2014     No results found for: TSH, FREET4  No results found for: PREGTESTUR, PREGSERUM, HCG, HCGQUANT  Pain Management Panel     Pain Management Panel Latest Ref Rng & Units 1/6/2014    CREATININE UR 24.0 - 392.0 mg/dL 52.3                          Radiology:    Imaging Results (last 7 days)     ** No results found for the last 168  hours. **          Assessment:    1.  Diabetic foot infection of the left great toe  2.  Associated cellulitis  3.  Increased LFTs  4.  Morbid obesity  5.  Hypertension, essential  6.  Chronic low back pain  7.  History of MRSA    Plan:     We'll place the patient on vancomycin and Zosyn.  Insulin sliding scale.  Check lab work including hemoglobin A1c.  Check EKG.  Consult Dr. Antoine.  We'll do a liver ultrasound, venous Doppler left leg, and a MRI of the foot.  Check hepatitis B and C testing.  Check lab work in the a.m.  Percocet for pain.  Place him on his home medications.  Anticipate stay is greater than 2 midnights.  Lovenox for DVT prophylaxis.  Further recommendations will depend on clinical course.    Stuart Hair,   01/02/18  2:42 PM

## 2018-01-02 NOTE — PLAN OF CARE
Problem: Patient Care Overview (Adult)  Goal: Plan of Care Review  Outcome: Ongoing (interventions implemented as appropriate)   01/02/18 1599   Coping/Psychosocial Response Interventions   Plan Of Care Reviewed With patient   Patient Care Overview   Progress no change   Outcome Evaluation   Outcome Summary/Follow up Plan NEW ADMISSION. ANTIBIOTIC THERAPY INITIATED. WOUND CARE PROVIDED.

## 2018-01-02 NOTE — CONSULTS
Referring Provider: Dr Hair  Reason for Consultation: left foot wound, cellulitis, abscess    Patient Care Team:  NEHEMIAH Parra as PCP - General    Chief complaint left foot cellulitis    Subjective .     History of present illness:  42-year-old diabetic male whose had ongoing problems and history with the left great toe wound for close to a year now.  He's had a trips to the emergency department off and on over the last month or 2.  Tells me that he previously saw Dr. Paul Burgess who is debriding the area.  Has been off and on various antibiotics.  Says he was seen over the weekend and prescribed doxycycline.  This was after he had presented with a fever.  Says he felt a little bit better and the fever improved but when he woke up this morning he had increased swelling and redness and warmth up his leg and ankle so he presented back to the emergency department.  I was contacted via ER staff and the patient was admitted to the hospitalist service for further workup.  Imaging has been ordered and is pending.  Broad-spectrum antibiotics have been started.  The patient states when he got up this morning and started walking there is a large amount of purulence that began pouring from the foot.    Review of Systems  All systems were reviewed and negative except for chief complaint.    History  Past Medical History:   Diagnosis Date   • Depression    • Diabetes mellitus    • Hypertension    • Injury of back    • Neuropathy    , Past Surgical History:   Procedure Laterality Date   • APPENDECTOMY     • BACK SURGERY     • CHOLECYSTECTOMY     • SCROTAL SURGERY     , Family History   Problem Relation Age of Onset   • Diabetes Mother    • COPD Mother    • Diabetes Father    , Social History   Substance Use Topics   • Smoking status: Current Every Day Smoker     Packs/day: 1.00     Types: Cigarettes   • Smokeless tobacco: Never Used   • Alcohol use No   , Prescriptions Prior to Admission   Medication  Sig Dispense Refill Last Dose   • buPROPion SR (WELLBUTRIN SR) 150 MG 12 hr tablet Take 150 mg by mouth Daily.   1/2/2018 at Unknown time   • Canagliflozin (INVOKANA) 300 MG tablet Take 300 mg by mouth Daily.   1/2/2018 at Unknown time   • doxycycline (DORYX) 100 MG enteric coated tablet Take 1 tablet by mouth 2 (Two) Times a Day for 10 days. 14 tablet 0 1/1/2018 at Unknown time   • DULoxetine (CYMBALTA) 60 MG capsule Take 60 mg by mouth Daily.   1/2/2018 at Unknown time   • furosemide (LASIX) 40 MG tablet Take 40 mg by mouth Daily.   1/1/2018 at Unknown time   • glimepiride (AMARYL) 4 MG tablet Take 8 mg by mouth Every Morning Before Breakfast.   1/1/2018 at Unknown time   • ibuprofen (ADVIL,MOTRIN) 600 MG tablet Take 1 tablet by mouth Every 8 (Eight) Hours As Needed for Mild Pain . 12 tablet 0 Past Week at Unknown time   • Insulin Glargine (TOUJEO SOLOSTAR SC) Inject 10 Units under the skin Daily.   1/1/2018 at Unknown time   • Insulin NPH Isophane & Regular (NOVOLIN 70/30 SC) Inject 20 Units under the skin 3 (Three) Times a Day Before Meals.   1/1/2018 at Unknown time   • lisinopril (PRINIVIL,ZESTRIL) 40 MG tablet Take 40 mg by mouth Daily.   1/1/2018 at Unknown time   • metFORMIN (GLUCOPHAGE) 500 MG tablet Take 500 mg by mouth 2 (Two) Times a Day With Meals.   1/2/2018 at Unknown time   • oxyCODONE-acetaminophen (PERCOCET)  MG per tablet Take 1 tablet by mouth Every 6 (Six) Hours As Needed for Moderate Pain .   1/1/2018 at Unknown time   • pregabalin (LYRICA) 100 MG capsule Take 150 mg by mouth 3 (Three) Times a Day.   1/1/2018 at Unknown time   • acetaminophen (TYLENOL 8 HOUR) 650 MG 8 hr tablet Take 1 tablet by mouth Every 8 (Eight) Hours As Needed for Mild Pain . 12 tablet 0    • ondansetron ODT (ZOFRAN-ODT) 4 MG disintegrating tablet Take 1 tablet by mouth Every 8 (Eight) Hours As Needed for Nausea or Vomiting. 8 tablet 0 Unknown at Unknown time   , Scheduled Meds:    [START ON 1/3/2018] buPROPion SR  "150 mg Oral Daily   [START ON 1/3/2018] DULoxetine 60 mg Oral Daily   enoxaparin 40 mg Subcutaneous Q24H   furosemide 40 mg Oral Daily   [START ON 1/3/2018] glipiZIDE 10 mg Oral QAM AC   insulin aspart 0-14 Units Subcutaneous 4x Daily AC & at Bedtime   lisinopril 40 mg Oral Daily   metFORMIN 500 mg Oral BID With Meals   piperacillin-tazobactam 3.375 g Intravenous Q6H   pregabalin 150 mg Oral TID   [START ON 1/3/2018] vancomycin 2,000 mg Intravenous Q12H   [START ON 1/4/2018] Pharmacy Consult  Does not apply Once   , Continuous Infusions:    Pharmacy to dose vancomycin    Pharmacy to Dose Zosyn    , PRN Meds:  •  acetaminophen  •  dextrose  •  dextrose  •  glucagon (human recombinant)  •  ondansetron  •  ondansetron ODT  •  oxyCODONE-acetaminophen  •  Pharmacy to dose vancomycin  •  Pharmacy to Dose Zosyn  •  sodium chloride  •  sodium chloride and Allergies:  Review of patient's allergies indicates no known allergies.    Objective     Vital Signs   /67 (Patient Position: Lying)  Pulse 95  Temp 98 °F (36.7 °C) (Oral)   Resp 18  Ht 182.9 cm (72\")  Wt 125 kg (275 lb)  SpO2 93%  BMI 37.3 kg/m2    Physical Exam:AAO ×3, afebrile vital signs stable, no acute distress  Appears to be a well-developed well-nourished male of apparent stated age  PERRLA  Cranial nerves II through XII intact  Heart regular rate rhythm  Lungs CTA bilaterally  Physical exam of the left foot shows that pulses are palpable, pedal hair is noted, warmth along with edema and erythema are noted to the left foot and particularly the left medial foot and ankle.  There is a redness extending up the medial leg as well.  On the plantar and medial aspect of the hallux interphalangeal joint there is a hyperkeratotic darkened colored wound area that has a purulent drainage that's able to be expressed from the wound.  The entire hallux is warm, erythematous, edematous and somewhat fluctuant to touch.  Not seemed to spread over into any of the " adjacent digits.     Results Review: White blood cell count is 13.5.  CRP is 21.8.  Blood glucose is 390.  He shows elevated alt and AST.  He has increase in neutrophils and monocytes  Imaging Results: Three-view x-rays of the left foot were taken in the emergency department yesterday.  The ulcerated area with increased soft tissue edema is noted along the medial hallux.  There are no obvious signs of bony destruction or osteomyelitis on x-rays.  MRI has been performed and pending.  Venous ultrasound has been ordered and is pending as well.    Assessment/Plan   42-year-old diabetic male with left hallux abscess and possible osteomyelitis  Active Problems:    Diabetic ulcer of toe of left foot associated with type 2 diabetes mellitus  On physical exam today I was able to express 2-3 cc of purulent matter out of the left great toe.  I wrapped this with Betadine wet-to-dry dressing.  I discussed the patient's clinical picture with him and recommended I&D of the great toe.  I explained to him that we're still waiting on radiology read and final results from the MRI but that would determine the potential need for IV antibiotics versus potential discharge on oral antibiotics.  I explained the potential need for amputation given spread of osteomyelitis.  He seems to be agreeable and understanding.  I discussed all the risks versus benefits and potential complications of the procedure including bleeding, worsening infection, nerve damage, potential need for surgery.  I discussed with him probable open packing of the wound with delayed wound closure.  Discussed amputation.  Discussed anesthetic complications.  He will be placed nothing by mouth after breakfast in the morning.  Surgery will be planned for tomorrow afternoon.  Dressing will be left clean dry and intact.  I will also prescribe him a flat Darco shoe.  Call me with any questions or concerns.    I discussed the patients findings and my recommendations with  patient    Sammy Antoine, LAILA  01/02/18  5:06 PM

## 2018-01-03 ENCOUNTER — APPOINTMENT (OUTPATIENT)
Dept: ULTRASOUND IMAGING | Facility: HOSPITAL | Age: 43
End: 2018-01-03

## 2018-01-03 ENCOUNTER — ANESTHESIA (OUTPATIENT)
Dept: PERIOP | Facility: HOSPITAL | Age: 43
End: 2018-01-03

## 2018-01-03 ENCOUNTER — ANESTHESIA EVENT (OUTPATIENT)
Dept: PERIOP | Facility: HOSPITAL | Age: 43
End: 2018-01-03

## 2018-01-03 LAB
ALBUMIN SERPL-MCNC: 3.9 G/DL (ref 3.5–5)
ALBUMIN/GLOB SERPL: 1.3 G/DL (ref 1–2)
ALP SERPL-CCNC: 211 U/L (ref 38–126)
ALT SERPL W P-5'-P-CCNC: 299 U/L (ref 13–69)
ANION GAP SERPL CALCULATED.3IONS-SCNC: 10.4 MMOL/L
AST SERPL-CCNC: 258 U/L (ref 15–46)
BASOPHILS # BLD AUTO: 0.04 10*3/MM3 (ref 0–0.2)
BASOPHILS NFR BLD AUTO: 0.5 % (ref 0–2.5)
BILIRUB SERPL-MCNC: 0.6 MG/DL (ref 0.2–1.3)
BUN BLD-MCNC: 14 MG/DL (ref 7–20)
BUN/CREAT SERPL: 17.5 (ref 6.3–21.9)
CALCIUM SPEC-SCNC: 8.9 MG/DL (ref 8.4–10.2)
CHLORIDE SERPL-SCNC: 103 MMOL/L (ref 98–107)
CO2 SERPL-SCNC: 29 MMOL/L (ref 26–30)
CREAT BLD-MCNC: 0.8 MG/DL (ref 0.6–1.3)
DEPRECATED RDW RBC AUTO: 41.4 FL (ref 37–54)
EOSINOPHIL # BLD AUTO: 0.48 10*3/MM3 (ref 0–0.7)
EOSINOPHIL NFR BLD AUTO: 5.6 % (ref 0–7)
ERYTHROCYTE [DISTWIDTH] IN BLOOD BY AUTOMATED COUNT: 12.3 % (ref 11.5–14.5)
GFR SERPL CREATININE-BSD FRML MDRD: 107 ML/MIN/1.73
GLOBULIN UR ELPH-MCNC: 2.9 GM/DL
GLUCOSE BLD-MCNC: 161 MG/DL (ref 74–98)
GLUCOSE BLDC GLUCOMTR-MCNC: 114 MG/DL (ref 70–130)
GLUCOSE BLDC GLUCOMTR-MCNC: 151 MG/DL (ref 70–130)
GLUCOSE BLDC GLUCOMTR-MCNC: 165 MG/DL (ref 70–130)
GLUCOSE BLDC GLUCOMTR-MCNC: 236 MG/DL (ref 70–130)
GLUCOSE BLDC GLUCOMTR-MCNC: 357 MG/DL (ref 70–130)
GRAM STN SPEC: NORMAL
GRAM STN SPEC: NORMAL
HBV CORE AB SER DONR QL IA: NEGATIVE
HBV CORE IGM SERPL QL IA: NEGATIVE
HBV E AB SERPL QL IA: NEGATIVE
HBV E AG SERPL QL IA: NEGATIVE
HBV SURFACE AB SER QL: NON REACTIVE
HBV SURFACE AG SERPL QL IA: NEGATIVE
HCT VFR BLD AUTO: 48.3 % (ref 42–52)
HCV AB S/CO SERPL IA: <0.1 S/CO RATIO (ref 0–0.9)
HGB BLD-MCNC: 16.6 G/DL (ref 14–18)
IMM GRANULOCYTES # BLD: 0.04 10*3/MM3 (ref 0–0.06)
IMM GRANULOCYTES NFR BLD: 0.5 % (ref 0–0.6)
LABORATORY COMMENT REPORT: NORMAL
LYMPHOCYTES # BLD AUTO: 2.43 10*3/MM3 (ref 0.6–3.4)
LYMPHOCYTES NFR BLD AUTO: 28.3 % (ref 10–50)
MCH RBC QN AUTO: 31.4 PG (ref 27–31)
MCHC RBC AUTO-ENTMCNC: 34.4 G/DL (ref 30–37)
MCV RBC AUTO: 91.5 FL (ref 80–94)
MONOCYTES # BLD AUTO: 0.77 10*3/MM3 (ref 0–0.9)
MONOCYTES NFR BLD AUTO: 9 % (ref 0–12)
NEUTROPHILS # BLD AUTO: 4.84 10*3/MM3 (ref 2–6.9)
NEUTROPHILS NFR BLD AUTO: 56.1 % (ref 37–80)
NRBC BLD MANUAL-RTO: 0 /100 WBC (ref 0–0)
PLATELET # BLD AUTO: 192 10*3/MM3 (ref 130–400)
PMV BLD AUTO: 9.5 FL (ref 6–12)
POTASSIUM BLD-SCNC: 4.4 MMOL/L (ref 3.5–5.1)
PROT SERPL-MCNC: 6.8 G/DL (ref 6.3–8.2)
RBC # BLD AUTO: 5.28 10*6/MM3 (ref 4.7–6.1)
SODIUM BLD-SCNC: 138 MMOL/L (ref 137–145)
WBC NRBC COR # BLD: 8.6 10*3/MM3 (ref 4.8–10.8)

## 2018-01-03 PROCEDURE — 28002 TREATMENT OF FOOT INFECTION: CPT | Performed by: PODIATRIST

## 2018-01-03 PROCEDURE — 25010000002 FENTANYL CITRATE (PF) 100 MCG/2ML SOLUTION: Performed by: NURSE ANESTHETIST, CERTIFIED REGISTERED

## 2018-01-03 PROCEDURE — 25010000002 PIPERACILLIN SOD-TAZOBACTAM PER 1 G: Performed by: INTERNAL MEDICINE

## 2018-01-03 PROCEDURE — 87075 CULTR BACTERIA EXCEPT BLOOD: CPT | Performed by: PODIATRIST

## 2018-01-03 PROCEDURE — 87205 SMEAR GRAM STAIN: CPT | Performed by: PODIATRIST

## 2018-01-03 PROCEDURE — 25010000002 PROPOFOL 10 MG/ML EMULSION: Performed by: NURSE ANESTHETIST, CERTIFIED REGISTERED

## 2018-01-03 PROCEDURE — 93971 EXTREMITY STUDY: CPT

## 2018-01-03 PROCEDURE — 76705 ECHO EXAM OF ABDOMEN: CPT

## 2018-01-03 PROCEDURE — 25010000002 MIDAZOLAM PER 1 MG: Performed by: NURSE ANESTHETIST, CERTIFIED REGISTERED

## 2018-01-03 PROCEDURE — 82962 GLUCOSE BLOOD TEST: CPT

## 2018-01-03 PROCEDURE — 80053 COMPREHEN METABOLIC PANEL: CPT | Performed by: INTERNAL MEDICINE

## 2018-01-03 PROCEDURE — 99232 SBSQ HOSP IP/OBS MODERATE 35: CPT | Performed by: INTERNAL MEDICINE

## 2018-01-03 PROCEDURE — 85025 COMPLETE CBC W/AUTO DIFF WBC: CPT | Performed by: INTERNAL MEDICINE

## 2018-01-03 PROCEDURE — 87070 CULTURE OTHR SPECIMN AEROBIC: CPT | Performed by: PODIATRIST

## 2018-01-03 PROCEDURE — 0J9R0ZZ DRAINAGE OF LEFT FOOT SUBCUTANEOUS TISSUE AND FASCIA, OPEN APPROACH: ICD-10-PCS | Performed by: PODIATRIST

## 2018-01-03 PROCEDURE — 63710000001 INSULIN ASPART PER 5 UNITS: Performed by: INTERNAL MEDICINE

## 2018-01-03 PROCEDURE — 25010000002 VANCOMYCIN PER 500 MG: Performed by: INTERNAL MEDICINE

## 2018-01-03 RX ORDER — MIDAZOLAM HYDROCHLORIDE 1 MG/ML
INJECTION INTRAMUSCULAR; INTRAVENOUS AS NEEDED
Status: DISCONTINUED | OUTPATIENT
Start: 2018-01-03 | End: 2018-01-03 | Stop reason: SURG

## 2018-01-03 RX ORDER — BUPIVACAINE HYDROCHLORIDE 5 MG/ML
INJECTION, SOLUTION EPIDURAL; INTRACAUDAL AS NEEDED
Status: DISCONTINUED | OUTPATIENT
Start: 2018-01-03 | End: 2018-01-03 | Stop reason: HOSPADM

## 2018-01-03 RX ORDER — LIDOCAINE HYDROCHLORIDE 10 MG/ML
INJECTION, SOLUTION INFILTRATION; PERINEURAL AS NEEDED
Status: DISCONTINUED | OUTPATIENT
Start: 2018-01-03 | End: 2018-01-03 | Stop reason: HOSPADM

## 2018-01-03 RX ORDER — FENTANYL CITRATE 50 UG/ML
INJECTION, SOLUTION INTRAMUSCULAR; INTRAVENOUS AS NEEDED
Status: DISCONTINUED | OUTPATIENT
Start: 2018-01-03 | End: 2018-01-03 | Stop reason: SURG

## 2018-01-03 RX ORDER — SODIUM CHLORIDE 0.9 % (FLUSH) 0.9 %
1-10 SYRINGE (ML) INJECTION AS NEEDED
Status: DISCONTINUED | OUTPATIENT
Start: 2018-01-03 | End: 2018-01-03 | Stop reason: HOSPADM

## 2018-01-03 RX ORDER — SODIUM CHLORIDE, SODIUM LACTATE, POTASSIUM CHLORIDE, CALCIUM CHLORIDE 600; 310; 30; 20 MG/100ML; MG/100ML; MG/100ML; MG/100ML
100 INJECTION, SOLUTION INTRAVENOUS CONTINUOUS
Status: DISCONTINUED | OUTPATIENT
Start: 2018-01-03 | End: 2018-01-04

## 2018-01-03 RX ORDER — PROPOFOL 10 MG/ML
VIAL (ML) INTRAVENOUS AS NEEDED
Status: DISCONTINUED | OUTPATIENT
Start: 2018-01-03 | End: 2018-01-03 | Stop reason: SURG

## 2018-01-03 RX ORDER — LIDOCAINE HYDROCHLORIDE 10 MG/ML
INJECTION, SOLUTION INFILTRATION; PERINEURAL
Status: DISPENSED
Start: 2018-01-03 | End: 2018-01-04

## 2018-01-03 RX ADMIN — INSULIN ASPART 12 UNITS: 100 INJECTION, SOLUTION INTRAVENOUS; SUBCUTANEOUS at 11:28

## 2018-01-03 RX ADMIN — PREGABALIN 150 MG: 75 CAPSULE ORAL at 09:17

## 2018-01-03 RX ADMIN — PROPOFOL 40 MG: 10 INJECTION, EMULSION INTRAVENOUS at 14:56

## 2018-01-03 RX ADMIN — TAZOBACTAM SODIUM AND PIPERACILLIN SODIUM 3.38 G: 375; 3 INJECTION, SOLUTION INTRAVENOUS at 23:32

## 2018-01-03 RX ADMIN — INSULIN ASPART 3 UNITS: 100 INJECTION, SOLUTION INTRAVENOUS; SUBCUTANEOUS at 17:40

## 2018-01-03 RX ADMIN — FENTANYL CITRATE 100 MCG: 50 INJECTION, SOLUTION INTRAMUSCULAR; INTRAVENOUS at 14:51

## 2018-01-03 RX ADMIN — SODIUM CHLORIDE, POTASSIUM CHLORIDE, SODIUM LACTATE AND CALCIUM CHLORIDE 100 ML/HR: 600; 310; 30; 20 INJECTION, SOLUTION INTRAVENOUS at 04:54

## 2018-01-03 RX ADMIN — DULOXETINE HYDROCHLORIDE 60 MG: 30 CAPSULE, DELAYED RELEASE ORAL at 09:17

## 2018-01-03 RX ADMIN — BUPROPION HYDROCHLORIDE 150 MG: 150 TABLET, FILM COATED, EXTENDED RELEASE ORAL at 09:17

## 2018-01-03 RX ADMIN — TAZOBACTAM SODIUM AND PIPERACILLIN SODIUM 3.38 G: 375; 3 INJECTION, SOLUTION INTRAVENOUS at 11:28

## 2018-01-03 RX ADMIN — ACETAMINOPHEN 650 MG: 325 TABLET, FILM COATED ORAL at 16:22

## 2018-01-03 RX ADMIN — TAZOBACTAM SODIUM AND PIPERACILLIN SODIUM 3.38 G: 375; 3 INJECTION, SOLUTION INTRAVENOUS at 04:54

## 2018-01-03 RX ADMIN — GLIPIZIDE 10 MG: 10 TABLET ORAL at 09:18

## 2018-01-03 RX ADMIN — PREGABALIN 150 MG: 75 CAPSULE ORAL at 21:48

## 2018-01-03 RX ADMIN — FUROSEMIDE 40 MG: 40 TABLET ORAL at 09:18

## 2018-01-03 RX ADMIN — VANCOMYCIN HYDROCHLORIDE 2000 MG: 500 INJECTION, POWDER, LYOPHILIZED, FOR SOLUTION INTRAVENOUS at 00:12

## 2018-01-03 RX ADMIN — INSULIN ASPART 5 UNITS: 100 INJECTION, SOLUTION INTRAVENOUS; SUBCUTANEOUS at 21:47

## 2018-01-03 RX ADMIN — LISINOPRIL 40 MG: 20 TABLET ORAL at 09:17

## 2018-01-03 RX ADMIN — SODIUM CHLORIDE, POTASSIUM CHLORIDE, SODIUM LACTATE AND CALCIUM CHLORIDE 100 ML/HR: 600; 310; 30; 20 INJECTION, SOLUTION INTRAVENOUS at 19:44

## 2018-01-03 RX ADMIN — MIDAZOLAM HYDROCHLORIDE 2 MG: 1 INJECTION, SOLUTION INTRAMUSCULAR; INTRAVENOUS at 14:51

## 2018-01-03 RX ADMIN — METFORMIN HYDROCHLORIDE 500 MG: 500 TABLET, FILM COATED ORAL at 09:17

## 2018-01-03 RX ADMIN — PREGABALIN 150 MG: 75 CAPSULE ORAL at 16:09

## 2018-01-03 RX ADMIN — TAZOBACTAM SODIUM AND PIPERACILLIN SODIUM 3.38 G: 375; 3 INJECTION, SOLUTION INTRAVENOUS at 16:22

## 2018-01-03 RX ADMIN — OXYCODONE HYDROCHLORIDE AND ACETAMINOPHEN 1 TABLET: 10; 325 TABLET ORAL at 09:36

## 2018-01-03 RX ADMIN — OXYCODONE HYDROCHLORIDE AND ACETAMINOPHEN 1 TABLET: 10; 325 TABLET ORAL at 19:44

## 2018-01-03 RX ADMIN — VANCOMYCIN HYDROCHLORIDE 2000 MG: 500 INJECTION, POWDER, LYOPHILIZED, FOR SOLUTION INTRAVENOUS at 12:58

## 2018-01-03 NOTE — ANESTHESIA POSTPROCEDURE EVALUATION
Patient: Wai Medeiros    Procedure Summary     Date Anesthesia Start Anesthesia Stop Room / Location    01/03/18 1451 1514 [facility] OR 5 /  [facility]       Procedure Diagnosis Surgeon Provider    INCISION AND DRAINAGE FOOT ABSCESS, GREAT TOE (Left Foot) Cellulitis of left lower extremity; Diabetic ulcer of toe of left foot associated with type 2 diabetes mellitus, unspecified ulcer stage  (Cellulitis of left lower extremity [L03.116]; Diabetic ulcer of toe of left foot associated with type 2 diabetes mellitus, unspecified ulcer stage [E11.621, L97.529]) Sammy Antoine DPM No responsible provider of record.          Anesthesia Type: MAC  Last vitals  BP   114/64 (01/03/18 1145)   Temp   98.3 °F (36.8 °C) (01/03/18 1145)   Pulse   72 (01/03/18 1145)   Resp   18 (01/03/18 1145)     SpO2   97 % (01/03/18 1145)     Post Anesthesia Care and Evaluation    Patient location during evaluation: PACU  Patient participation: complete - patient participated  Level of consciousness: awake and alert  Pain score: 0  Pain management: satisfactory to patient  Airway patency: patent  Anesthetic complications: No anesthetic complications  PONV Status: none  Cardiovascular status: stable and acceptable  Respiratory status: acceptable  Hydration status: acceptable

## 2018-01-03 NOTE — PROGRESS NOTES
[facility]    Name:  Wai Medeiros   Age:  42 y.o.  Sex:  male  :  1975  MRN:  [id number]   Visit Number:  [id number]  Admission Date:  2018  Date Of Service:  18  Primary Care Physician:  NEHEMIAH Parra     LOS: 0 days :  Patient Care Team:  NEHEMIAH Parra as PCP - General:    History taken from:     patient    Chief Complaint:      Left foot pain.    Subjective     Interval History:     This is a 42-year-old  male with history diabetes type 2, hypertension, depression, chronic low back pain who one month ago, the callus off of his left great toe.  It developed swelling in mid December.  He came to the emergency room was given amoxicillin.  This did not help area and he came back yesterday as he had increased swelling, he was placed on doxycycline and told to have close follow-up if he did not improve.  He returned with increased swelling, and redness was  advancing up his leg on the left.  He also has significant pustular drainage from the great toe.  He denies any nausea, vomiting, diarrhea.  He does not have any chest pressure or shortness of breath.  He does have fever and chills.   his pain is improved.   He does have a history of MRSA in the past.  He had necrotizing fasciitis which was operated on by urology.    X-ray and MRI of the foot was negative.  AST and ALT are also elevated.  He is being taken to surgery by Dr. Antoine today.  He denies any other complaints.  Ultrasound and liver was reviewed and is consistent with fatty liver.  Venous Doppler was negative.    Review of Systems:     All systems were reviewed and negative except for:  Integument: positive for  ulcer left great toe    Objective     Vital Signs:    Temp:  [97.5 °F (36.4 °C)-98.3 °F (36.8 °C)] 98.3 °F (36.8 °C)  Heart Rate:  [72-90] 72  Resp:  [16-18] 18  BP: (114-119)/(61-77) 114/64    Physical Exam:      General Appearance:    Alert,  cooperative, in no acute distress   Head:    Normocephalic, without obvious abnormality, atraumatic   Eyes:            Lids and lashes normal, conjunctivae and sclerae normal, no   icterus, no pallor, corneas clear, PERRLA   Ears:    Ears appear intact with no abnormalities noted   Throat:   No oral lesions, no thrush, oral mucosa moist   Neck:   No adenopathy, supple, trachea midline, no thyromegaly, no   carotid bruit, no JVD   Back:     No kyphosis present, no scoliosis present, no skin lesions,      erythema or scars, no tenderness to percussion or                   palpation,   range of motion normal   Lungs:     Clear to auscultation,respirations regular, even and                  unlabored    Heart:    Regular rhythm and normal rate, normal S1 and S2, no            murmur, no gallop, no rub, no click   Chest Wall:    No abnormalities observed   Abdomen:     Normal bowel sounds, no masses, no organomegaly, soft        non-tender, non-distended, no guarding, no rebound                tenderness   Rectal:     Deferred   Extremities:   Moves all extremities well, no edema, no cyanosis,Left great toe ulceration noted.  Redness the calf is fading.     Pulses:   Pulses palpable and equal bilaterally   Skin:   No bleeding, bruising or rash   Lymph nodes:   No palpable adenopathy   Neurologic:   Cranial nerves 2 - 12 grossly intact, sensation intact, DTR       present and equal bilaterally        Results Review:      I reviewed the patient's new clinical results.    Labs:    Lab Results (last 24 hours)     Procedure Component Value Units Date/Time    Hepatitis B & C Profile [[id number]] Collected:  01/02/18 0945    Specimen:  Blood Updated:  01/02/18 1511    Hemoglobin A1c [[id number]]  (Abnormal) Collected:  01/02/18 0945    Specimen:  Blood Updated:  01/02/18 1824     Hemoglobin A1C 9.7 (H) %     Narrative:       Expected HgbA1C results:     3% to 6% HgbA1C      HgbA1C                 Estimated Average Glucose     5%                               97 mg/dl   6%                             126 mg/dl   7%                             154 mg/dl   8%                             183 mg/dl   9%                             212 mg/dl  >10%                           >240 mg/dl      POC Glucose Once [[id number]]  (Abnormal) Collected:  01/02/18 2053    Specimen:  Blood Updated:  01/02/18 2101     Glucose 323 (H) mg/dL       Serial Number: [id number]:  [id number]       POC Glucose Once [[id number]]  (Abnormal) Collected:  01/03/18 0626    Specimen:  Blood Updated:  01/03/18 0632     Glucose 151 (H) mg/dL       Serial Number: [id number]:  [id number]       CBC & Differential [[id number]] Collected:  01/03/18 0851    Specimen:  Blood Updated:  01/03/18 0909    Narrative:       The following orders were created for panel order CBC & Differential.  Procedure                               Abnormality         Status                     ---------                               -----------         ------                     CBC Auto Differential[[id number]]        Abnormal            Final result                 Please view results for these tests on the individual orders.    CBC Auto Differential [[id number]]  (Abnormal) Collected:  01/03/18 0851    Specimen:  Blood Updated:  01/03/18 0909     WBC 8.60 10*3/mm3      RBC 5.28 10*6/mm3      Hemoglobin 16.6 g/dL      Hematocrit 48.3 %      MCV 91.5 fL      MCH 31.4 (H) pg      MCHC 34.4 g/dL      RDW 12.3 %      RDW-SD 41.4 fl      MPV 9.5 fL      Platelets 192 10*3/mm3      Neutrophil % 56.1 %      Lymphocyte % 28.3 %      Monocyte % 9.0 %      Eosinophil % 5.6 %      Basophil % 0.5 %      Immature Grans % 0.5 %      Neutrophils, Absolute 4.84 10*3/mm3      Lymphocytes, Absolute 2.43 10*3/mm3      Monocytes, Absolute 0.77 10*3/mm3      Eosinophils, Absolute 0.48 10*3/mm3      Basophils, Absolute 0.04 10*3/mm3      Immature Grans, Absolute 0.04 10*3/mm3      nRBC 0.0 /100 WBC     Comprehensive  Metabolic Panel [[id number]]  (Abnormal) Collected:  01/03/18 0851    Specimen:  Blood Updated:  01/03/18 0950     Glucose 161 (H) mg/dL      BUN 14 mg/dL      Creatinine 0.80 mg/dL      Sodium 138 mmol/L      Potassium 4.4 mmol/L      Chloride 103 mmol/L      CO2 29.0 mmol/L      Calcium 8.9 mg/dL      Total Protein 6.8 g/dL      Albumin 3.90 g/dL      ALT (SGPT) 299 (H) U/L      AST (SGOT) 258 (H) U/L      Alkaline Phosphatase 211 (H) U/L      Total Bilirubin 0.6 mg/dL      eGFR Non African Amer 107 mL/min/1.73      Globulin 2.9 gm/dL      A/G Ratio 1.3 g/dL      BUN/Creatinine Ratio 17.5     Anion Gap 10.4 mmol/L     Narrative:       Abnormal estimated GFR should be followed by more specific studies to confirm end stage chronic renal disease. The equation used for calculation may not be accurate for patients less than 19 years old, greater than 70 years old, patients at extremes of weight, malnutrition, or with acute renal dysfunction.    POC Glucose Once [[id number]]  (Abnormal) Collected:  01/03/18 1115    Specimen:  Blood Updated:  01/03/18 1127     Glucose 357 (H) mg/dL       Serial Number: [id number]:  [id number]              Radiology:    Imaging Results (last 24 hours)     Procedure Component Value Units Date/Time    MRI Foot Left Without Contrast [[id number]] Collected:  01/02/18 1618     Updated:  01/02/18 1622    Narrative:       PROCEDURE: MRI FOOT LEFT WO CONTRAST-     HISTORY: r/o osteomyelitis; E11.621-Type 2 diabetes mellitus with foot  ulcer; L97.529-Non-pressure chronic ulcer of other part of left foot  with unspecified severity; L03.116-Cellulitis of left lower limb     TECHNIQUE: Multiplanar multisequence imaging of the foot was performed  without the use of intravenous contrast.     FINDINGS: The flexor and extensor tendons are intact. The Lisfranc  ligament is intact. The plantar fascia is normal. The articular  cartilage is preserved. Bone marrow signal is homogeneous. There is soft  tissue  edema within the foot most pronounced medially and dorsally.       Impression:       Diffuse soft tissue swelling with no evidence of an abscess  or osteomyelitis.     This report was finalized on 1/2/2018 4:20 PM by Codey Anderson M.D..    US Venous Doppler Lower Extremity Left (duplex) [[id number]] Collected:  01/03/18 0934     Updated:  01/03/18 0937    Narrative:       PROCEDURE: US VENOUS DOPPLER LOWER EXTREMITY LEFT (DUPLEX)-     HISTORY: leg swelling; E11.621-Type 2 diabetes mellitus with foot ulcer;  L97.529-Non-pressure chronic ulcer of other part of left foot with  unspecified severity; L03.116-Cellulitis of left lower limb     PROCEDURE: Multiple transverse and longitudinal scans were performed of  the femoropopliteal deep venous system, with augmentation and  compression maneuvers.     FINDINGS: Normal phasic flow was noted in the visualized deep venous  system. No intraluminal increased echogenicity is noted to suggest  thrombus. There is normal compression and augmentation of the venous  structures.  No abnormal venous collaterals are seen.       Impression:       No evidence of deep venous thrombosis.     This report was finalized on 1/3/2018 9:35 AM by Codey Anderson M.D..    US Liver [[id number]] Collected:  01/03/18 0935     Updated:  01/03/18 0937    Narrative:       PROCEDURE: US LIVER-     HISTORY: elevated lfts; E11.621-Type 2 diabetes mellitus with foot  ulcer; L97.529-Non-pressure chronic ulcer of other part of left foot  with unspecified severity; L03.116-Cellulitis of left lower limb     TECHNIQUE: Sonographic images of the liver were obtained in the  transverse and sagittal planes with color flow and pulse Doppler.     FINDINGS: The liver is echogenic consistent with fatty infiltration.  There is no evidence of a focal liver mass. The hepatic vasculature is  patent with normal directional flow. The portal vein measures  16.4 mm .  The patient is status post cholecystectomy. The  common duct measures 5.1  mm. Limited images of the right kidney are unremarkable.       Impression:       Fatty infiltration of the liver.        This report was finalized on 1/3/2018 9:35 AM by Codey Anderson M.D..          Medication Review:       [MAR Hold] buPROPion  mg Oral Daily   [MAR Hold] DULoxetine 60 mg Oral Daily   [MAR Hold] enoxaparin 40 mg Subcutaneous Q24H   [MAR Hold] furosemide 40 mg Oral Daily   [MAR Hold] glipiZIDE 10 mg Oral QAM AC   [MAR Hold] insulin aspart 0-14 Units Subcutaneous 4x Daily AC & at Bedtime   lidocaine      lisinopril 40 mg Oral Daily   [MAR Hold] metFORMIN 500 mg Oral BID With Meals   [MAR Hold] piperacillin-tazobactam 3.375 g Intravenous Q6H   pregabalin 150 mg Oral TID   [MAR Hold] vancomycin 2,000 mg Intravenous Q12H   [MAR Hold] Pharmacy Consult  Does not apply Once         lactated ringers 100 mL/hr Last Rate: 100 mL/hr (01/03/18 0659)   Pharmacy to dose vancomycin     Pharmacy to Dose Zosyn         Assessment/Plan     Problem List Items Addressed This Visit     Diabetic ulcer of toe of left foot associated with type 2 diabetes mellitus - Primary    Relevant Medications    metFORMIN (GLUCOPHAGE) 500 MG tablet    glimepiride (AMARYL) 4 MG tablet    Canagliflozin (INVOKANA) 300 MG tablet    Insulin NPH Isophane & Regular (NOVOLIN 70/30 SC)    Insulin Glargine (TOUJEO SOLOSTAR SC)    Other Relevant Orders    Case Request (Completed)    Cellulitis of left lower extremity    Overview     Added automatically from request for surgery [id number]         Relevant Orders    Case Request (Completed)          1.  Diabetic foot infection of the left great toe  2.  Associated cellulitis  3.  Increased LFTs Due to fatty liver disease.  4.  Morbid obesity  5.  Hypertension, essential  6.  Chronic low back pain  7.  History of MRSA    Plan:    Continue with vancomycin and Zosyn.  Continue with insulin sliding scale.  Diabetic teaching for this patient.  MRI was negative for  osteomyelitis.  Patient is going to surgery today with Dr. Antoine.  Check lab work in the a.m.  Further recommendations will depend on clinical course.    Stuart Hair DO  01/03/18  2:19 PM

## 2018-01-03 NOTE — PLAN OF CARE
Problem: Patient Care Overview (Adult)  Goal: Plan of Care Review  Outcome: Ongoing (interventions implemented as appropriate)   01/03/18 1526   Coping/Psychosocial Response Interventions   Plan Of Care Reviewed With patient;spouse   Patient Care Overview   Progress no change   Outcome Evaluation   Outcome Summary/Follow up Plan Pt had incision and drainage today; pain control      Goal: Adult Individualization and Mutuality  Outcome: Ongoing (interventions implemented as appropriate)    Goal: Discharge Needs Assessment  Outcome: Ongoing (interventions implemented as appropriate)      Problem: Diabetes, Type 2 (Adult)  Goal: Signs and Symptoms of Listed Potential Problems Will be Absent or Manageable (Diabetes, Type 2)  Outcome: Ongoing (interventions implemented as appropriate)      Problem: Infection, Risk/Actual (Adult)  Goal: Infection Prevention/Resolution  Outcome: Ongoing (interventions implemented as appropriate)      Problem: Skin Integrity Impairment, Risk/Actual (Adult)  Goal: Skin Integrity/Wound Healing  Outcome: Ongoing (interventions implemented as appropriate)

## 2018-01-03 NOTE — PLAN OF CARE
Problem: Perioperative Period (Adult)  Intervention: Promote Pulmonary Hygiene and Secretion Clearance   01/03/18 1517   Promote Aggressive Pulmonary Hygiene/Secretion Management   Cough And Deep Breathing done with encouragement   Positioning   Head Of Bed (HOB) Position HOB elevated   Activity   Activity Type activity adjusted per tolerance;dorsiflexion, plantar flexion encouraged     Intervention: Monitor/Manage Pain   01/03/18 1517   Safety Interventions   Medication Review/Management medications reviewed   Manage Acute Burn Pain   Bowel Intervention adequate fluid intake promoted   Pain Management Interventions pain care plan reviewed with patient/caregiver;pillow support     Intervention: Promote Normothermia   01/03/18 1517   Cardiac Interventions   Warming Thermoregulation Maintenance warm blankets applied       Goal: Signs and Symptoms of Listed Potential Problems Will be Absent or Manageable (Perioperative Period)  Outcome: Ongoing (interventions implemented as appropriate)

## 2018-01-03 NOTE — PROGRESS NOTES
Continued Stay Note  VERONICA Hernandez     Patient Name: Wai Medeiros  MRN: [id number]  Today's Date: 1/3/2018    Admit Date: 1/2/2018          Discharge Plan       01/03/18 1459    Case Management/Social Work Plan    Additional Comments Discharge  planning held in or at this time               Discharge Codes     None            Katt Blood

## 2018-01-03 NOTE — ANESTHESIA PREPROCEDURE EVALUATION
Anesthesia Evaluation     Patient summary reviewed and Nursing notes reviewed   NPO Solid Status: > 4 hours  NPO Liquid Status: > 4 hours     Airway   Mallampati: II  TM distance: >3 FB  Neck ROM: full  no difficulty expected  Dental      Pulmonary    Cardiovascular   Exercise tolerance: good (4-7 METS)    Rhythm: regular  Rate: normal    (+) hypertension,       Neuro/Psych  (+) psychiatric history,     GI/Hepatic/Renal/Endo    (+)  diabetes mellitus,     Musculoskeletal     Abdominal    Substance History      OB/GYN          Other                                                Anesthesia Plan    ASA 3     MAC     intravenous induction   Anesthetic plan and risks discussed with patient.    Plan discussed with CRNA.

## 2018-01-03 NOTE — BRIEF OP NOTE
INCISION AND DRAINAGE FOOT ABSCESS  Progress Note    Wai Medeiros  1/2/2018 - 1/3/2018    Pre-op Diagnosis:   Cellulitis of left lower extremity [L03.116]  Diabetic ulcer of toe of left foot associated with type 2 diabetes mellitus, unspecified ulcer stage [E11.621, L97.529]       Post-Op Diagnosis Codes:     * Cellulitis of left lower extremity [L03.116]     * Diabetic ulcer of toe of left foot associated with type 2 diabetes mellitus, unspecified ulcer stage [E11.621, L97.529]    Procedure/CPT® Codes:  1. Left hallux incision and drainage, cpt 73870    Procedure(s):  INCISION AND DRAINAGE left FOOT ABSCESS    Surgeon(s):  Sammy Antoine DPM    Anesthesia: Monitor Anesthesia Care    Staff:   Circulator: Liya Jeffrey RN  Scrub Person: Beau Pedroza    Estimated Blood Loss: minimal    Urine Voided: * No values recorded between 1/3/2018 12:00 AM and 1/3/2018  2:36 PM *    Specimens:                Per dictation      Drains:    none       Findings: per dictation    Complications: none      Sammy Antoine DPM     Date: 1/3/2018  Time: 2:36 PM

## 2018-01-03 NOTE — PLAN OF CARE
Problem: Perioperative Period (Adult)  Goal: Signs and Symptoms of Listed Potential Problems Will be Absent or Manageable (Perioperative Period)  Outcome: Ongoing (interventions implemented as appropriate)   01/03/18 1415   Perioperative Period   Problems Assessed (Perioperative Period) all   Problems Present (Perioperative Period) none

## 2018-01-03 NOTE — H&P (VIEW-ONLY)
[facility]    Name:  Wai Medeiros   Age:  42 y.o.  Sex:  male  :  1975  MRN:  [id number]   Visit Number:  [id number]  Admission Date:  2018  Date Of Service:  18  Primary Care Physician:  NEHEMIAH Parra     LOS: 0 days :  Patient Care Team:  NEHEMIAH Parra as PCP - General:    History taken from:     patient    Chief Complaint:      Left foot pain.    Subjective     Interval History:     This is a 42-year-old  male with history diabetes type 2, hypertension, depression, chronic low back pain who one month ago, the callus off of his left great toe.  It developed swelling in mid December.  He came to the emergency room was given amoxicillin.  This did not help area and he came back yesterday as he had increased swelling, he was placed on doxycycline and told to have close follow-up if he did not improve.  He returned with increased swelling, and redness was  advancing up his leg on the left.  He also has significant pustular drainage from the great toe.  He denies any nausea, vomiting, diarrhea.  He does not have any chest pressure or shortness of breath.  He does have fever and chills.   his pain is improved.   He does have a history of MRSA in the past.  He had necrotizing fasciitis which was operated on by urology.    X-ray and MRI of the foot was negative.  AST and ALT are also elevated.  He is being taken to surgery by Dr. Antoine today.  He denies any other complaints.  Ultrasound and liver was reviewed and is consistent with fatty liver.  Venous Doppler was negative.    Review of Systems:     All systems were reviewed and negative except for:  Integument: positive for  ulcer left great toe    Objective     Vital Signs:    Temp:  [97.5 °F (36.4 °C)-98.3 °F (36.8 °C)] 98.3 °F (36.8 °C)  Heart Rate:  [72-90] 72  Resp:  [16-18] 18  BP: (114-119)/(61-77) 114/64    Physical Exam:      General Appearance:    Alert,  cooperative, in no acute distress   Head:    Normocephalic, without obvious abnormality, atraumatic   Eyes:            Lids and lashes normal, conjunctivae and sclerae normal, no   icterus, no pallor, corneas clear, PERRLA   Ears:    Ears appear intact with no abnormalities noted   Throat:   No oral lesions, no thrush, oral mucosa moist   Neck:   No adenopathy, supple, trachea midline, no thyromegaly, no   carotid bruit, no JVD   Back:     No kyphosis present, no scoliosis present, no skin lesions,      erythema or scars, no tenderness to percussion or                   palpation,   range of motion normal   Lungs:     Clear to auscultation,respirations regular, even and                  unlabored    Heart:    Regular rhythm and normal rate, normal S1 and S2, no            murmur, no gallop, no rub, no click   Chest Wall:    No abnormalities observed   Abdomen:     Normal bowel sounds, no masses, no organomegaly, soft        non-tender, non-distended, no guarding, no rebound                tenderness   Rectal:     Deferred   Extremities:   Moves all extremities well, no edema, no cyanosis,Left great toe ulceration noted.  Redness the calf is fading.     Pulses:   Pulses palpable and equal bilaterally   Skin:   No bleeding, bruising or rash   Lymph nodes:   No palpable adenopathy   Neurologic:   Cranial nerves 2 - 12 grossly intact, sensation intact, DTR       present and equal bilaterally        Results Review:      I reviewed the patient's new clinical results.    Labs:    Lab Results (last 24 hours)     Procedure Component Value Units Date/Time    Hepatitis B & C Profile [[id number]] Collected:  01/02/18 0945    Specimen:  Blood Updated:  01/02/18 1511    Hemoglobin A1c [[id number]]  (Abnormal) Collected:  01/02/18 0945    Specimen:  Blood Updated:  01/02/18 1824     Hemoglobin A1C 9.7 (H) %     Narrative:       Expected HgbA1C results:     3% to 6% HgbA1C      HgbA1C                 Estimated Average Glucose     5%                               97 mg/dl   6%                             126 mg/dl   7%                             154 mg/dl   8%                             183 mg/dl   9%                             212 mg/dl  >10%                           >240 mg/dl      POC Glucose Once [[id number]]  (Abnormal) Collected:  01/02/18 2053    Specimen:  Blood Updated:  01/02/18 2101     Glucose 323 (H) mg/dL       Serial Number: [id number]:  [id number]       POC Glucose Once [[id number]]  (Abnormal) Collected:  01/03/18 0626    Specimen:  Blood Updated:  01/03/18 0632     Glucose 151 (H) mg/dL       Serial Number: [id number]:  [id number]       CBC & Differential [[id number]] Collected:  01/03/18 0851    Specimen:  Blood Updated:  01/03/18 0909    Narrative:       The following orders were created for panel order CBC & Differential.  Procedure                               Abnormality         Status                     ---------                               -----------         ------                     CBC Auto Differential[[id number]]        Abnormal            Final result                 Please view results for these tests on the individual orders.    CBC Auto Differential [[id number]]  (Abnormal) Collected:  01/03/18 0851    Specimen:  Blood Updated:  01/03/18 0909     WBC 8.60 10*3/mm3      RBC 5.28 10*6/mm3      Hemoglobin 16.6 g/dL      Hematocrit 48.3 %      MCV 91.5 fL      MCH 31.4 (H) pg      MCHC 34.4 g/dL      RDW 12.3 %      RDW-SD 41.4 fl      MPV 9.5 fL      Platelets 192 10*3/mm3      Neutrophil % 56.1 %      Lymphocyte % 28.3 %      Monocyte % 9.0 %      Eosinophil % 5.6 %      Basophil % 0.5 %      Immature Grans % 0.5 %      Neutrophils, Absolute 4.84 10*3/mm3      Lymphocytes, Absolute 2.43 10*3/mm3      Monocytes, Absolute 0.77 10*3/mm3      Eosinophils, Absolute 0.48 10*3/mm3      Basophils, Absolute 0.04 10*3/mm3      Immature Grans, Absolute 0.04 10*3/mm3      nRBC 0.0 /100 WBC     Comprehensive  Metabolic Panel [[id number]]  (Abnormal) Collected:  01/03/18 0851    Specimen:  Blood Updated:  01/03/18 0950     Glucose 161 (H) mg/dL      BUN 14 mg/dL      Creatinine 0.80 mg/dL      Sodium 138 mmol/L      Potassium 4.4 mmol/L      Chloride 103 mmol/L      CO2 29.0 mmol/L      Calcium 8.9 mg/dL      Total Protein 6.8 g/dL      Albumin 3.90 g/dL      ALT (SGPT) 299 (H) U/L      AST (SGOT) 258 (H) U/L      Alkaline Phosphatase 211 (H) U/L      Total Bilirubin 0.6 mg/dL      eGFR Non African Amer 107 mL/min/1.73      Globulin 2.9 gm/dL      A/G Ratio 1.3 g/dL      BUN/Creatinine Ratio 17.5     Anion Gap 10.4 mmol/L     Narrative:       Abnormal estimated GFR should be followed by more specific studies to confirm end stage chronic renal disease. The equation used for calculation may not be accurate for patients less than 19 years old, greater than 70 years old, patients at extremes of weight, malnutrition, or with acute renal dysfunction.    POC Glucose Once [[id number]]  (Abnormal) Collected:  01/03/18 1115    Specimen:  Blood Updated:  01/03/18 1127     Glucose 357 (H) mg/dL       Serial Number: [id number]:  [id number]              Radiology:    Imaging Results (last 24 hours)     Procedure Component Value Units Date/Time    MRI Foot Left Without Contrast [[id number]] Collected:  01/02/18 1618     Updated:  01/02/18 1622    Narrative:       PROCEDURE: MRI FOOT LEFT WO CONTRAST-     HISTORY: r/o osteomyelitis; E11.621-Type 2 diabetes mellitus with foot  ulcer; L97.529-Non-pressure chronic ulcer of other part of left foot  with unspecified severity; L03.116-Cellulitis of left lower limb     TECHNIQUE: Multiplanar multisequence imaging of the foot was performed  without the use of intravenous contrast.     FINDINGS: The flexor and extensor tendons are intact. The Lisfranc  ligament is intact. The plantar fascia is normal. The articular  cartilage is preserved. Bone marrow signal is homogeneous. There is soft  tissue  edema within the foot most pronounced medially and dorsally.       Impression:       Diffuse soft tissue swelling with no evidence of an abscess  or osteomyelitis.     This report was finalized on 1/2/2018 4:20 PM by Codey Anderson M.D..    US Venous Doppler Lower Extremity Left (duplex) [[id number]] Collected:  01/03/18 0934     Updated:  01/03/18 0937    Narrative:       PROCEDURE: US VENOUS DOPPLER LOWER EXTREMITY LEFT (DUPLEX)-     HISTORY: leg swelling; E11.621-Type 2 diabetes mellitus with foot ulcer;  L97.529-Non-pressure chronic ulcer of other part of left foot with  unspecified severity; L03.116-Cellulitis of left lower limb     PROCEDURE: Multiple transverse and longitudinal scans were performed of  the femoropopliteal deep venous system, with augmentation and  compression maneuvers.     FINDINGS: Normal phasic flow was noted in the visualized deep venous  system. No intraluminal increased echogenicity is noted to suggest  thrombus. There is normal compression and augmentation of the venous  structures.  No abnormal venous collaterals are seen.       Impression:       No evidence of deep venous thrombosis.     This report was finalized on 1/3/2018 9:35 AM by Codey Anderson M.D..    US Liver [[id number]] Collected:  01/03/18 0935     Updated:  01/03/18 0937    Narrative:       PROCEDURE: US LIVER-     HISTORY: elevated lfts; E11.621-Type 2 diabetes mellitus with foot  ulcer; L97.529-Non-pressure chronic ulcer of other part of left foot  with unspecified severity; L03.116-Cellulitis of left lower limb     TECHNIQUE: Sonographic images of the liver were obtained in the  transverse and sagittal planes with color flow and pulse Doppler.     FINDINGS: The liver is echogenic consistent with fatty infiltration.  There is no evidence of a focal liver mass. The hepatic vasculature is  patent with normal directional flow. The portal vein measures  16.4 mm .  The patient is status post cholecystectomy. The  common duct measures 5.1  mm. Limited images of the right kidney are unremarkable.       Impression:       Fatty infiltration of the liver.        This report was finalized on 1/3/2018 9:35 AM by Codey Anderson M.D..          Medication Review:       [MAR Hold] buPROPion  mg Oral Daily   [MAR Hold] DULoxetine 60 mg Oral Daily   [MAR Hold] enoxaparin 40 mg Subcutaneous Q24H   [MAR Hold] furosemide 40 mg Oral Daily   [MAR Hold] glipiZIDE 10 mg Oral QAM AC   [MAR Hold] insulin aspart 0-14 Units Subcutaneous 4x Daily AC & at Bedtime   lidocaine      lisinopril 40 mg Oral Daily   [MAR Hold] metFORMIN 500 mg Oral BID With Meals   [MAR Hold] piperacillin-tazobactam 3.375 g Intravenous Q6H   pregabalin 150 mg Oral TID   [MAR Hold] vancomycin 2,000 mg Intravenous Q12H   [MAR Hold] Pharmacy Consult  Does not apply Once         lactated ringers 100 mL/hr Last Rate: 100 mL/hr (01/03/18 0659)   Pharmacy to dose vancomycin     Pharmacy to Dose Zosyn         Assessment/Plan     Problem List Items Addressed This Visit     Diabetic ulcer of toe of left foot associated with type 2 diabetes mellitus - Primary    Relevant Medications    metFORMIN (GLUCOPHAGE) 500 MG tablet    glimepiride (AMARYL) 4 MG tablet    Canagliflozin (INVOKANA) 300 MG tablet    Insulin NPH Isophane & Regular (NOVOLIN 70/30 SC)    Insulin Glargine (TOUJEO SOLOSTAR SC)    Other Relevant Orders    Case Request (Completed)    Cellulitis of left lower extremity    Overview     Added automatically from request for surgery [id number]         Relevant Orders    Case Request (Completed)          1.  Diabetic foot infection of the left great toe  2.  Associated cellulitis  3.  Increased LFTs Due to fatty liver disease.  4.  Morbid obesity  5.  Hypertension, essential  6.  Chronic low back pain  7.  History of MRSA    Plan:    Continue with vancomycin and Zosyn.  Continue with insulin sliding scale.  Diabetic teaching for this patient.  MRI was negative for  osteomyelitis.  Patient is going to surgery today with Dr. Antoine.  Check lab work in the a.m.  Further recommendations will depend on clinical course.    Stuart Hair DO  01/03/18  2:19 PM

## 2018-01-03 NOTE — PLAN OF CARE
Problem: Patient Care Overview (Adult)  Goal: Plan of Care Review  Outcome: Ongoing (interventions implemented as appropriate)   01/03/18 0411   Coping/Psychosocial Response Interventions   Plan Of Care Reviewed With patient   Patient Care Overview   Progress no change   Outcome Evaluation   Outcome Summary/Follow up Plan no acute events overnight. awaiting [employer] studies this AM follow by surgery later this afternoon. Continue antibiotics and pain management. Continue to monitor patient.      Goal: Discharge Needs Assessment  Outcome: Ongoing (interventions implemented as appropriate)      Problem: Diabetes, Type 2 (Adult)  Goal: Signs and Symptoms of Listed Potential Problems Will be Absent or Manageable (Diabetes, Type 2)  Outcome: Ongoing (interventions implemented as appropriate)      Problem: Infection, Risk/Actual (Adult)  Goal: Identify Related Risk Factors and Signs and Symptoms  Outcome: Outcome(s) achieved Date Met: 01/03/18    Goal: Infection Prevention/Resolution  Outcome: Ongoing (interventions implemented as appropriate)      Problem: Skin Integrity Impairment, Risk/Actual (Adult)  Goal: Identify Related Risk Factors and Signs and Symptoms  Outcome: Outcome(s) achieved Date Met: 01/03/18    Goal: Skin Integrity/Wound Healing  Outcome: Ongoing (interventions implemented as appropriate)

## 2018-01-03 NOTE — ANESTHESIA PREPROCEDURE EVALUATION
Anesthesia Evaluation     Patient summary reviewed and Nursing notes reviewed   NPO Solid Status: > 8 hours  NPO Liquid Status: > 8 hours     Airway   Mallampati: II  TM distance: >3 FB  Neck ROM: full  possible difficult intubation  Dental      Pulmonary    Cardiovascular   Exercise tolerance: good (4-7 METS)    ECG reviewed  Rhythm: regular  Rate: normal    (+) hypertension,       Neuro/Psych  (+) psychiatric history Anxiety and Depression,     GI/Hepatic/Renal/Endo    (+) obesity,  diabetes mellitus,     Musculoskeletal     Abdominal    Substance History      OB/GYN          Other   (+) arthritis                                       Anesthesia Plan    ASA 3     MAC     intravenous induction   Anesthetic plan and risks discussed with patient.    Plan discussed with CRNA.

## 2018-01-03 NOTE — PLAN OF CARE
Problem: Patient Care Overview (Adult)  Goal: Plan of Care Review  Outcome: Ongoing (interventions implemented as appropriate)   01/03/18 9879   Coping/Psychosocial Response Interventions   Plan Of Care Reviewed With patient   Patient Care Overview   Progress progress toward functional goals as expected   Outcome Evaluation   Outcome Summary/Follow up Plan VSS, PACU CARE COMPLETE, RETURNED TO 4TH FLOOR IN STABLE CONDITION

## 2018-01-03 NOTE — OP NOTE
PREOPERATIVE DIAGNOSIS:   Left hallux abscess, diabetes    POSTOPERATIVE DIAGNOSIS: same    PROCEDURE PERFORMED:  Left hallux incision and drainage, cpt 25069    SURGEON:  Sammy Antoine DPM    ANESTHESIA:  Mac, local.  Preoperative local consisted of 20 cc of a one-to-one mixture of 0.5% Marcaine and 1% lidocaine plain    HEMOSTASIS:  nonee    EBL:  minimal    SPECIMENS:  Deep tissue swabs from the left foot abscess sent to micro    COMPLICATIONS:  none    MATERIALS:  One quarter inch iodoform packing    INDICATIONS FOR PROCEDURE:  Jacky is a very pleasant 42-year-old male admitted to [facility] yesterday after having multiple trips to the emergency department secondary to a chronic left great toe callous/wound/ulceration.  States that he's been treated for this for close to a year now.  He's had problems with this off and on.  States it would heal and open back up.  He has been seen in the past by Dr. wilkinson in [place] where he states the callus wound area was debrided.  He was seen in the emergency department back early in December and then presented over the weekend.  He was complaining initially of some fever and constitutional symptoms.  He states the antibiotic helped that but he woke up yesterday morning noticing significant redness up his foot and ankle and leg.  He thus presented back to the emergency department where he was admitted.  X-rays were taken which were negative for any obvious air in the soft tissues or fluid collection or bony destruction.  MRI was performed which was read as no fluid collection or abscess or osteomyelitis.  In my clinical opinion there is an area in the plantar and distal aspect of the proximal phalanx of the hallux that is concerning for a Royal's abscess.  Especially given the patient's chronic history of the wound this is concerning.  When I saw him in consultation yesterday at bedside there was a fair amount of purulent drainage that could be expressed from  the plantar medial hallux wound.  Physical exam revealed that he was grossly neurovascularly intact to the left lower extremity.  Pulses are palpable.  Pedal hair is noted.  Protective sensation was diminished.  There is a thickened hyperkeratotic calloused ulceration along the plantar and medial aspect of the hallux IPJ.  The hallux is erythematous and edematous.  He had redness and erythema and warmth she streaking and extending up the medial foot and ankle even up the medial leg.  No significant odor was noted.  I recommended incision and drainage of the foot to the patient.  I discussed potential need for long-term IV antibiotics versus by mouth antibiotics.  I discussed potential staged nature of the procedure resulting in open packing and later wound closure.  I discussed the risk of amputation.  I discussed all the risks versus benefits and general complications of a surgical procedure.  No guarantees or assurances were given or implied.  Consent was obtained preoperatively.  All his questions were answered.  He was seen preoperatively and the correct surgical side was marked.  He has been on broad-spectrum antibiotics since admission.  DESCRIPTION OF PROCEDURE:    The patient was brought to the operating room and left on his preoperative stretcher.  Monitored anesthesia was administered.  The left foot was anesthetized with 20 cc of a one-to-one mixture of 0.5% Marcaine and 1% lidocaine plain.  Timeout was performed identifying the correct surgical patient, procedure, side.  Left foot was scrubbed, prepped, and draped in usual aseptic manner area attention was directed to the left great toe where initially pickups and scissors and 15 blade were used to excisionally debrided large amount of hyperkeratotic dead callused tissue as well as bloody tissue.  There was a full-thickness ulceration noted along the medial hallux IPJ as well as plantarly with a raw appearing erythematous skin bridge between the 2  wounds.  I was able to probe into these wounds and some slight purulent matter was noted.  At that time a 15 blade was used to create a 3 cm linear incision along the plantar medial aspect of the hallux.  Incision was deepened down with blunt dissection.  Small amount of purulent matter was identified.  Deep tissue swabs were taken of this tissue deeply and sent to microbiology.  The wound was probed in multiple directions.  I was able to probe down to the periosteum of the proximal phalanx.  The wound seemed to probe along the entire plantar aspect of the hallux and even plantarly underneath the sesamoids down towards the plantar arch of the foot.  Once the probing and tracking was noted I made a second 1 cm incision in the plantar aspect of the medial arch.  This was noted to communicate with the original incision.  3 L of bacitracin mixed normal sterile saline was irrigated through the 2 wounds.  The wound was then packed with 1/4 inch iodoform packing and dressed with a dry dressing.  Dressing consisted of Adaptic, 4 x 4's, Kerlix, Ace wrap.  The patient tolerated the procedure and anesthesia well.  He was transferred from operating room to the recovery room with vital signs stable and neurovascular status intact to the left foot.

## 2018-01-04 LAB
ALBUMIN SERPL-MCNC: 4.2 G/DL (ref 3.5–5)
ALBUMIN/GLOB SERPL: 1.4 G/DL (ref 1–2)
ALP SERPL-CCNC: 327 U/L (ref 38–126)
ALT SERPL W P-5'-P-CCNC: 375 U/L (ref 13–69)
ANION GAP SERPL CALCULATED.3IONS-SCNC: 14.5 MMOL/L
AST SERPL-CCNC: 158 U/L (ref 15–46)
BASOPHILS # BLD AUTO: 0.08 10*3/MM3 (ref 0–0.2)
BASOPHILS NFR BLD AUTO: 0.5 % (ref 0–2.5)
BILIRUB SERPL-MCNC: 0.5 MG/DL (ref 0.2–1.3)
BUN BLD-MCNC: 16 MG/DL (ref 7–20)
BUN/CREAT SERPL: 20 (ref 6.3–21.9)
CALCIUM SPEC-SCNC: 9.4 MG/DL (ref 8.4–10.2)
CHLORIDE SERPL-SCNC: 101 MMOL/L (ref 98–107)
CO2 SERPL-SCNC: 27 MMOL/L (ref 26–30)
CREAT BLD-MCNC: 0.8 MG/DL (ref 0.6–1.3)
DEPRECATED RDW RBC AUTO: 41.8 FL (ref 37–54)
EOSINOPHIL # BLD AUTO: 0.53 10*3/MM3 (ref 0–0.7)
EOSINOPHIL NFR BLD AUTO: 3.5 % (ref 0–7)
ERYTHROCYTE [DISTWIDTH] IN BLOOD BY AUTOMATED COUNT: 12.4 % (ref 11.5–14.5)
GFR SERPL CREATININE-BSD FRML MDRD: 107 ML/MIN/1.73
GLOBULIN UR ELPH-MCNC: 3.1 GM/DL
GLUCOSE BLD-MCNC: 218 MG/DL (ref 74–98)
GLUCOSE BLDC GLUCOMTR-MCNC: 199 MG/DL (ref 70–130)
GLUCOSE BLDC GLUCOMTR-MCNC: 241 MG/DL (ref 70–130)
GLUCOSE BLDC GLUCOMTR-MCNC: 271 MG/DL (ref 70–130)
GLUCOSE BLDC GLUCOMTR-MCNC: 334 MG/DL (ref 70–130)
HCT VFR BLD AUTO: 49.5 % (ref 42–52)
HGB BLD-MCNC: 17.1 G/DL (ref 14–18)
IMM GRANULOCYTES # BLD: 0.1 10*3/MM3 (ref 0–0.06)
IMM GRANULOCYTES NFR BLD: 0.7 % (ref 0–0.6)
LYMPHOCYTES # BLD AUTO: 3.23 10*3/MM3 (ref 0.6–3.4)
LYMPHOCYTES NFR BLD AUTO: 21.3 % (ref 10–50)
MCH RBC QN AUTO: 31.6 PG (ref 27–31)
MCHC RBC AUTO-ENTMCNC: 34.5 G/DL (ref 30–37)
MCV RBC AUTO: 91.5 FL (ref 80–94)
MONOCYTES # BLD AUTO: 1.02 10*3/MM3 (ref 0–0.9)
MONOCYTES NFR BLD AUTO: 6.7 % (ref 0–12)
NEUTROPHILS # BLD AUTO: 10.23 10*3/MM3 (ref 2–6.9)
NEUTROPHILS NFR BLD AUTO: 67.3 % (ref 37–80)
NRBC BLD MANUAL-RTO: 0 /100 WBC (ref 0–0)
PLATELET # BLD AUTO: 267 10*3/MM3 (ref 130–400)
PMV BLD AUTO: 9.9 FL (ref 6–12)
POTASSIUM BLD-SCNC: 4.5 MMOL/L (ref 3.5–5.1)
PROT SERPL-MCNC: 7.3 G/DL (ref 6.3–8.2)
RBC # BLD AUTO: 5.41 10*6/MM3 (ref 4.7–6.1)
SODIUM BLD-SCNC: 138 MMOL/L (ref 137–145)
VANCOMYCIN TROUGH SERPL-MCNC: 11.32 MCG/ML (ref 5–15)
WBC NRBC COR # BLD: 15.19 10*3/MM3 (ref 4.8–10.8)

## 2018-01-04 PROCEDURE — 85025 COMPLETE CBC W/AUTO DIFF WBC: CPT | Performed by: INTERNAL MEDICINE

## 2018-01-04 PROCEDURE — 25010000002 ENOXAPARIN PER 10 MG: Performed by: INTERNAL MEDICINE

## 2018-01-04 PROCEDURE — 25010000002 PIPERACILLIN SOD-TAZOBACTAM PER 1 G: Performed by: INTERNAL MEDICINE

## 2018-01-04 PROCEDURE — 80053 COMPREHEN METABOLIC PANEL: CPT | Performed by: INTERNAL MEDICINE

## 2018-01-04 PROCEDURE — 99024 POSTOP FOLLOW-UP VISIT: CPT | Performed by: PODIATRIST

## 2018-01-04 PROCEDURE — 63710000001 INSULIN ASPART PER 5 UNITS: Performed by: INTERNAL MEDICINE

## 2018-01-04 PROCEDURE — 80202 ASSAY OF VANCOMYCIN: CPT

## 2018-01-04 PROCEDURE — 25010000002 VANCOMYCIN PER 500 MG: Performed by: INTERNAL MEDICINE

## 2018-01-04 PROCEDURE — 99232 SBSQ HOSP IP/OBS MODERATE 35: CPT | Performed by: INTERNAL MEDICINE

## 2018-01-04 PROCEDURE — 82962 GLUCOSE BLOOD TEST: CPT

## 2018-01-04 RX ADMIN — VANCOMYCIN HYDROCHLORIDE 2000 MG: 500 INJECTION, POWDER, LYOPHILIZED, FOR SOLUTION INTRAVENOUS at 02:15

## 2018-01-04 RX ADMIN — PREGABALIN 150 MG: 75 CAPSULE ORAL at 21:19

## 2018-01-04 RX ADMIN — INSULIN ASPART 3 UNITS: 100 INJECTION, SOLUTION INTRAVENOUS; SUBCUTANEOUS at 06:44

## 2018-01-04 RX ADMIN — INSULIN ASPART 8 UNITS: 100 INJECTION, SOLUTION INTRAVENOUS; SUBCUTANEOUS at 12:07

## 2018-01-04 RX ADMIN — ENOXAPARIN SODIUM 40 MG: 40 INJECTION SUBCUTANEOUS at 17:08

## 2018-01-04 RX ADMIN — PREGABALIN 150 MG: 75 CAPSULE ORAL at 17:08

## 2018-01-04 RX ADMIN — FUROSEMIDE 40 MG: 40 TABLET ORAL at 08:40

## 2018-01-04 RX ADMIN — BUPROPION HYDROCHLORIDE 150 MG: 150 TABLET, FILM COATED, EXTENDED RELEASE ORAL at 08:38

## 2018-01-04 RX ADMIN — DULOXETINE HYDROCHLORIDE 60 MG: 30 CAPSULE, DELAYED RELEASE ORAL at 08:41

## 2018-01-04 RX ADMIN — INSULIN ASPART 5 UNITS: 100 INJECTION, SOLUTION INTRAVENOUS; SUBCUTANEOUS at 17:10

## 2018-01-04 RX ADMIN — VANCOMYCIN HYDROCHLORIDE 2000 MG: 500 INJECTION, POWDER, LYOPHILIZED, FOR SOLUTION INTRAVENOUS at 13:23

## 2018-01-04 RX ADMIN — TAZOBACTAM SODIUM AND PIPERACILLIN SODIUM 3.38 G: 375; 3 INJECTION, SOLUTION INTRAVENOUS at 17:08

## 2018-01-04 RX ADMIN — OXYCODONE HYDROCHLORIDE AND ACETAMINOPHEN 1 TABLET: 10; 325 TABLET ORAL at 21:25

## 2018-01-04 RX ADMIN — ACETAMINOPHEN 650 MG: 325 TABLET, FILM COATED ORAL at 17:20

## 2018-01-04 RX ADMIN — LISINOPRIL 40 MG: 20 TABLET ORAL at 08:41

## 2018-01-04 RX ADMIN — OXYCODONE HYDROCHLORIDE AND ACETAMINOPHEN 1 TABLET: 10; 325 TABLET ORAL at 12:10

## 2018-01-04 RX ADMIN — OXYCODONE HYDROCHLORIDE AND ACETAMINOPHEN 1 TABLET: 10; 325 TABLET ORAL at 17:20

## 2018-01-04 RX ADMIN — PREGABALIN 150 MG: 75 CAPSULE ORAL at 08:40

## 2018-01-04 RX ADMIN — INSULIN ASPART 12 UNITS: 100 INJECTION, SOLUTION INTRAVENOUS; SUBCUTANEOUS at 21:19

## 2018-01-04 RX ADMIN — TAZOBACTAM SODIUM AND PIPERACILLIN SODIUM 3.38 G: 375; 3 INJECTION, SOLUTION INTRAVENOUS at 22:55

## 2018-01-04 RX ADMIN — GLIPIZIDE 10 MG: 10 TABLET ORAL at 06:44

## 2018-01-04 RX ADMIN — TAZOBACTAM SODIUM AND PIPERACILLIN SODIUM 3.38 G: 375; 3 INJECTION, SOLUTION INTRAVENOUS at 05:21

## 2018-01-04 RX ADMIN — TAZOBACTAM SODIUM AND PIPERACILLIN SODIUM 3.38 G: 375; 3 INJECTION, SOLUTION INTRAVENOUS at 10:23

## 2018-01-04 RX ADMIN — OXYCODONE HYDROCHLORIDE AND ACETAMINOPHEN 1 TABLET: 10; 325 TABLET ORAL at 06:44

## 2018-01-04 NOTE — PROGRESS NOTES
Adult Nutrition  Assessment/PES    Patient Name:  Wai Medeiros  YOB: 1975  MRN: [id number]  Admit Date:  1/2/2018    Assessment Date:  1/4/2018    Comments:  Rec. #1: Continue with diet as ordered. Pt. Consuming ~75% of meals on average per NSG. RD added Catracho BID. Rec.#2: Consider adding MVI with minerals daily and Vitamin C 500 mg BID to promote wound healing. Consult RD PRN.           Reason for Assessment       01/04/18 1015    Reason for Assessment    Reason For Assessment/Visit admission assessment    Identified At Risk By Screening Criteria large or nonhealing wound, burn or pressure ulcer;diagnosis;BMI    Cardiac HTN    Endocrine DM Type 2    Skin Cellulitis;Skin breakdown;Surgical wound    Factors Affecting Nutrition Factors    Appetite Good                  Labs/Tests/Procedures/Meds       01/04/18 1016    Labs/Tests/Procedures/Meds    Labs/Tests Review Reviewed;Cl-;BUN;Creat;Alb;Hgb Hct   High: Cl, BUN, Cr    Medication Review Reviewed, pertinent;Insulin;Antibiotic;Diabetic Oral Agent;Other (comment)   Lasix            Physical Findings       01/04/18 1022    Physical Findings/Assessment    Additional Documentation Physical Appearance (Group)    Physical Appearance    Overall Physical Appearance obese            Estimated/Assessed Needs       01/04/18 1022    Calculation Measurements    Weight Used For Calculations 82.1 kg (180 lb 14.9 oz)    Height Used for Calculations 1.829 m (6')    Estimated/Assessed Energy Needs    Energy Need Method [place] or    Age 42    RMR ([place] Jeor Equation) 1768.7    Activity Factors ([place] [facility])  Out of bed, ambulatory  1.3    Estimated Kcal Range  ~7341-4728    Estimated/Assessed Protein Needs    Weight Used for Protein Calculation 82.1 kg (180 lb 14.9 oz)    Protein (gm/kg) 1.5    1.5 Gm Protein (gm) 123.1    Estimated Protein Range ~98..1    Estimated/Assessed Fluid Needs    Fluid Need Method RDA method    RDA Method  (mL)  2500            Nutrition Prescription Ordered       01/04/18 1023    Nutrition Prescription PO    Current PO Diet Regular    Common Modifiers Consistent Carbohydrate            Evaluation of Received Nutrient/Fluid Intake       01/04/18 1023    PO Evaluation    Number of Days PO Intake Evaluated 1 day    Number of Meals 1    % PO Intake 75            Problem/Interventions:        Problem 1       01/04/18 1024    Nutrition Diagnoses Problem 1    Problem 1 Overweight/Obesity    Etiology (related to) Factors Affecting Nutrition    Food Habit/Preferences Large Meals    Signs/Symptoms (evidenced by) BMI    BMI 35 - 39.9            Problem 2       01/04/18 1024    Nutrition Diagnoses Problem 2    Problem 2 Increased Nutrient Needs    Macronutrient Kcal;Fluid;Protein    Micronutrient Vitamin;Mineral    Etiology (related to) Medical Diagnosis    Skin Cellulitis;Skin breakdown;Surgical wound    Signs/Symptoms (evidenced by) Other (comment)   wound healing            Problem 3       01/04/18 1025    Nutrition Diagnoses Problem 3    Problem 3 Altered Nutrition Related to Labs    Etiology (related to) Medical Diagnosis    Endocrine DM Type 2    Signs/Symptoms (evidenced by) Biochemical    Labs Reviewed Done    Specific Labs Noted Glucose                Intervention Goal       01/04/18 1025    Intervention Goal    General Meet nutritional needs for age/condition    PO PO intake (%)    PO Intake % 75 %    Weight No significant weight loss            Nutrition Intervention       01/04/18 1026    Nutrition Intervention    RD/Tech Action Encourage intake;Recommend/ordered;Supplement provided;Interview for preference;Advise available snack;Follow Tx progress    Recommended/Ordered Supplement            Nutrition Prescription       01/04/18 1026    Nutrition Prescription PO    PO Prescription Begin/change supplement   Cont. diet    Supplement Catracho    Supplement Frequency 2 times a day    New PO Prescription Ordered? Yes             Education/Evaluation       01/04/18 1026    Education    Education Provided education regarding;Education topics    Education Topics Diabetes;Other (comment)   wound healing    Monitor/Evaluation    Monitor Per protocol;PO intake;Supplement intake;Pertinent labs;Weight;Skin status        Electronically signed by:  Nevaeh Franco RD  01/04/18 10:27 AM

## 2018-01-04 NOTE — PROGRESS NOTES
"Pharmacokinetic Follow-up Note - Vancomycin     Wai Medeiros is a 42 y.o. male  182.9 cm (72\") 125 kg (275 lb 1.6 oz)     Indication for use: Cellulitis      Results from last 7 days  Lab Units 01/04/18  0620 01/03/18  0851 01/02/18  0945   WBC 10*3/mm3 15.19* 8.60 13.54*   CREATININE mg/dL 0.80 0.80 0.80      Estimated Creatinine Clearance: 167.7 mL/min (by C-G formula based on Cr of 0.8).  Temp Readings from Last 1 Encounters:   01/04/18 98 °F (36.7 °C) (Oral)     Lab Results   Component Value Date    [facility] 11.32 01/04/2018       Microbiology:  Microbiology Results (last 10 days)     Procedure Component Value - Date/Time    Gram Stain [[id number]] Collected:  01/03/18 1508    Lab Status:  Final result Specimen:  Wound from Toe, Left Updated:  01/03/18 1616     Gram Stain Result Rare (1+) WBCs seen      No organisms seen    Wound Culture - Wound, Toe, Left [[id number]]  (Abnormal) Collected:  01/02/18 1316    Lab Status:  Preliminary result Specimen:  Wound from Toe, Left Updated:  01/04/18 0652     Wound Culture --      Light growth (2+) Streptococcus agalactiae (Group B) (A)      Sensitivity to follow.           STREP GROUPING B          Current Vancomycin Dose:  2000 mg IVPB every 12 hours    Other Antimicrobials: Zosyn 3.375 g IV q8h    Assessment/Plan:  Trough within goal range of 10-15. Continue current dose. De-escalate when appropriate.  Pharmacy will continue to monitor renal function and adjust dose accordingly.    Rupesh Graham, [facility]   01/04/18 3:51 PM  "

## 2018-01-04 NOTE — PROGRESS NOTES
[facility]    Nerve Cath Post Op Call    Patient Name: Wai Medeiros  :  1975  MRN:  [id number]  Date of Discharge:     Treatment Plan        No anesthesia related problems

## 2018-01-04 NOTE — PROGRESS NOTES
[facility]    Name:  Wai Medeiros   Age:  42 y.o.  Sex:  male  :  1975  MRN:  [id number]   Visit Number:  [id number]  Admission Date:  2018  Date Of Service:  18  Primary Care Physician:  NEHEMIAH Parra     LOS: 1 day :  Patient Care Team:  NEHEMIAH Parra as PCP - General:    History taken from:     patient    Chief Complaint:      Diabetic foot infection    Subjective     Interval History:     42-year-old  male with diabetes type 2, hypertension, depression who cut a callus off his left great toe.  It developed swelling after that, he was given amoxicillin.  This did not help so he came back to the ER, was placed on doxycycline, then came back the next day as it was worse.  Dr. Antoine was consulted, he took a pt to surgery for I&D yesterday.  Culture is growing out strep thus far.  Patient does have a history of MRSA in the past.  MRI of the foot was negative.  Plan is to take him back to surgery tomorrow, then discharge.  Patient denies any chest pressure, shortness breath, nausea, vomiting, or pain.    Review of Systems:     All systems were reviewed and negative except for:  Integument: positive for  wound on left great toe    Objective     Vital Signs:    Temp:  [97.6 °F (36.4 °C)-98.6 °F (37 °C)] 98.1 °F (36.7 °C)  Heart Rate:  [] 83  Resp:  [12-20] 16  BP: (109-122)/(54-78) 118/68    Physical Exam:      General Appearance:    Alert, cooperative, in no acute distress   Head:    Normocephalic, without obvious abnormality, atraumatic   Eyes:            Lids and lashes normal, conjunctivae and sclerae normal, no   icterus, no pallor, corneas clear, PERRLA   Ears:    Ears appear intact with no abnormalities noted   Throat:   No oral lesions, no thrush, oral mucosa moist   Neck:   No adenopathy, supple, trachea midline, no thyromegaly, no   carotid bruit, no JVD   Back:     No kyphosis present, no scoliosis  present, no skin lesions,      erythema or scars, no tenderness to percussion or                   palpation,   range of motion normal   Lungs:     Clear to auscultation,respirations regular, even and                  unlabored    Heart:    Regular rhythm and normal rate, normal S1 and S2, no            murmur, no gallop, no rub, no click   Chest Wall:    No abnormalities observed   Abdomen:     Normal bowel sounds, no masses, no organomegaly, soft        non-tender, non-distended, no guarding, no rebound                tenderness   Rectal:     Deferred   Extremities:   Moves all extremities well, no edema, no cyanosis, no             redness   Pulses:   Pulses palpable and equal bilaterally   Skin:  Wound on left great toe with associated cellulitis which is greatly improved on the left lower leg.     Lymph nodes:   No palpable adenopathy   Neurologic:   Cranial nerves 2 - 12 grossly intact, sensation intact, DTR       present and equal bilaterally        Results Review:      I reviewed the patient's new clinical results.    Labs:    Lab Results (last 24 hours)     Procedure Component Value Units Date/Time    POC Glucose Once [[id number]]  (Normal) Collected:  01/03/18 1421    Specimen:  Blood Updated:  01/03/18 1425     Glucose 114 mg/dL       Serial Number: [id number]:  [id number]       Hepatitis B & C Profile [[id number]] Collected:  01/02/18 0945    Specimen:  Blood Updated:  01/03/18 1512     Hepatitis B Surface Ag Negative     Hep B E Ag Negative     Hep B Core IgM Negative     Hep B Core Total Ab Negative     Hep B E Ab Negative     Hep B S Ab Non Reactive                    Non Reactive: Inconsistent with immunity,                              less than 10 mIU/mL                Reactive:     Consistent with immunity,                              greater than 9.9 mIU/mL        Hepatitis C Ab <0.1 s/co ratio     Narrative:       Performed at:  [address] [place] [address]   [id number]  : Aden Ledezma PhD, Phone:  [phone]    Comment: [[id number]] Collected:  01/02/18 0945    Specimen:  Blood Updated:  01/03/18 1512     Comment Comment      Non reactive HCV antibody screen is consistent with no HCV infection,  unless recent infection is suspected or other evidence exists to  indicate HCV infection.       Narrative:       Performed at:  [facility]  [id number]  : Aden Ledezma PhD, Phone:  [phone]    Anaerobic Culture - Wound, Toe, Left [[id number]] Collected:  01/03/18 1508    Specimen:  Wound from Toe, Left Updated:  01/03/18 1602    Wound Culture - Wound, Toe, Left [[id number]] Collected:  01/03/18 1508    Specimen:  Wound from Toe, Left Updated:  01/03/18 1602    Gram Stain [[id number]] Collected:  01/03/18 1508    Specimen:  Wound from Toe, Left Updated:  01/03/18 1616     Gram Stain Result Rare (1+) WBCs seen      No organisms seen    POC Glucose Once [[id number]]  (Abnormal) Collected:  01/03/18 1706    Specimen:  Blood Updated:  01/03/18 1710     Glucose 165 (H) mg/dL       Serial Number: [id number]:  [id number]       POC Glucose Once [[id number]]  (Abnormal) Collected:  01/03/18 2036    Specimen:  Blood Updated:  01/03/18 2056     Glucose 236 (H) mg/dL       Serial Number: [id number]:  [id number]       POC Glucose Once [[id number]]  (Abnormal) Collected:  01/04/18 0614    Specimen:  Blood Updated:  01/04/18 0622     Glucose 199 (H) mg/dL       Serial Number: [id number]:  [id number]       Wound Culture - Wound, Toe, Left [[id number]]  (Abnormal) Collected:  01/02/18 1316    Specimen:  Wound from Toe, Left Updated:  01/04/18 0652     Wound Culture --      Light growth (2+) Streptococcus agalactiae (Group B) (A)      Sensitivity to follow.           STREP GROUPING B    CBC & Differential [[id number]] Collected:  01/04/18 0620    Specimen:  Blood Updated:  01/04/18 0722    Narrative:       The following  orders were created for panel order CBC & Differential.  Procedure                               Abnormality         Status                     ---------                               -----------         ------                     CBC Auto Differential[[id number]]        Abnormal            Final result                 Please view results for these tests on the individual orders.    CBC Auto Differential [[id number]]  (Abnormal) Collected:  01/04/18 0620    Specimen:  Blood Updated:  01/04/18 0722     WBC 15.19 (H) 10*3/mm3      RBC 5.41 10*6/mm3      Hemoglobin 17.1 g/dL      Hematocrit 49.5 %      MCV 91.5 fL      MCH 31.6 (H) pg      MCHC 34.5 g/dL      RDW 12.4 %      RDW-SD 41.8 fl      MPV 9.9 fL      Platelets 267 10*3/mm3      Neutrophil % 67.3 %      Lymphocyte % 21.3 %      Monocyte % 6.7 %      Eosinophil % 3.5 %      Basophil % 0.5 %      Immature Grans % 0.7 (H) %      Neutrophils, Absolute 10.23 (H) 10*3/mm3      Lymphocytes, Absolute 3.23 10*3/mm3      Monocytes, Absolute 1.02 (H) 10*3/mm3      Eosinophils, Absolute 0.53 10*3/mm3      Basophils, Absolute 0.08 10*3/mm3      Immature Grans, Absolute 0.10 (H) 10*3/mm3      nRBC 0.0 /100 WBC     Comprehensive Metabolic Panel [[id number]]  (Abnormal) Collected:  01/04/18 0620    Specimen:  Blood Updated:  01/04/18 0739     Glucose 218 (H) mg/dL       Glucose >180, Hemoglobin A1C recommended.        BUN 16 mg/dL      Creatinine 0.80 mg/dL      Sodium 138 mmol/L      Potassium 4.5 mmol/L      Chloride 101 mmol/L      CO2 27.0 mmol/L      Calcium 9.4 mg/dL      Total Protein 7.3 g/dL      Albumin 4.20 g/dL      ALT (SGPT) 375 (C) U/L      AST (SGOT) 158 (H) U/L      Alkaline Phosphatase 327 (H) U/L      Total Bilirubin 0.5 mg/dL      eGFR Non African Amer 107 mL/min/1.73      Globulin 3.1 gm/dL      A/G Ratio 1.4 g/dL      BUN/Creatinine Ratio 20.0     Anion Gap 14.5 mmol/L     Narrative:       Abnormal estimated GFR should be followed by more specific studies  to confirm end stage chronic renal disease. The equation used for calculation may not be accurate for patients less than 19 years old, greater than 70 years old, patients at extremes of weight, malnutrition, or with acute renal dysfunction.    POC Glucose Once [[id number]]  (Abnormal) Collected:  01/04/18 1136    Specimen:  Blood Updated:  01/04/18 1158     Glucose 271 (H) mg/dL       Serial Number: [id number]:  [id number]       Vancomycin, Trough [[id number]]  (Normal) Collected:  01/04/18 1121    Specimen:  Blood Updated:  01/04/18 1225     Vancomycin Trough 11.32 mcg/mL            Radiology:    Imaging Results (last 24 hours)     ** No results found for the last 24 hours. **          Medication Review:       buPROPion  mg Oral Daily   DULoxetine 60 mg Oral Daily   enoxaparin 40 mg Subcutaneous Q24H   furosemide 40 mg Oral Daily   glipiZIDE 10 mg Oral QAM AC   insulin aspart 0-14 Units Subcutaneous 4x Daily AC & at Bedtime   lisinopril 40 mg Oral Daily   metFORMIN 500 mg Oral BID With Meals   piperacillin-tazobactam 3.375 g Intravenous Q6H   pregabalin 150 mg Oral TID   vancomycin 2,000 mg Intravenous Q12H   Pharmacy Consult  Does not apply Once         lactated ringers 100 mL/hr Last Rate: 100 mL/hr (01/03/18 1944)   Pharmacy to dose vancomycin     Pharmacy to Dose Zosyn         Assessment/Plan     Problem List Items Addressed This Visit     Diabetic ulcer of toe of left foot associated with type 2 diabetes mellitus - Primary    Relevant Medications    metFORMIN (GLUCOPHAGE) 500 MG tablet    glimepiride (AMARYL) 4 MG tablet    Canagliflozin (INVOKANA) 300 MG tablet    Insulin NPH Isophane & Regular (NOVOLIN 70/30 SC)    Insulin Glargine (TOUJEO SOLOSTAR SC)    Other Relevant Orders    Case Request (Completed)    Anaerobic Culture - Wound, Toe, Left    Gram Stain (Completed)    Wound Culture - Wound, Toe, Left    Cellulitis of left lower extremity    Overview     Added automatically from request for  surgery [id number]         Relevant Orders    Case Request (Completed)    Anaerobic Culture - Wound, Toe, Left    Gram Stain (Completed)    Wound Culture - Wound, Toe, Left          1.  Diabetic foot infection of the left great toe, Growing out group B strep  2.  Associated cellulitis  3.  Increased LFTs Due to fatty liver disease.  4.  Morbid obesity  5.  Hypertension, essential  6.  Chronic low back pain  7.  History of MRSA    Plan:    Continue with vancomycin and Zosyn for now.  Diabetic teaching has been done for this patient.  Discussed with Dr. Antoine, he plans to take the patient to surgery tomorrow, then he can be discharged home.  Check lab work in the a.m.  Further recommendations will depend on clinical course.    Stuart Hair,   01/04/18  12:26 PM

## 2018-01-04 NOTE — PROGRESS NOTES
Discharge Planning Assessment   David     Patient Name: Wai Medeiros  MRN: [id number]  Today's Date: 1/4/2018    Admit Date: 1/2/2018          Discharge Needs Assessment       01/04/18 0840    Living Environment    Provides Primary Care For no one    Able to Return to Prior Living Arrangements yes    Discharge Needs Assessment    Discharge Contact Information if Applicable Wai Mcdowell  [phone]            Discharge Plan       01/04/18 0841    Case Management/Social Work Plan    Plan Home     Patient/Family In Agreement With Plan yes    Additional Comments Spoke to pt regarding discharge plans He is independent with ADLS  .Denies any needs Confirmed Address and phone number   Does not have a POA declines information regarding this         Discharge Placement     No information found        Expected Discharge Date and Time     Expected Discharge Date Expected Discharge Time    Jan 5, 2018               Demographic Summary       01/04/18 0840    Referral Information    Admission Type inpatient    Arrived From home or self-care    Referral Source admission list    Reason For Consult discharge planning            Functional Status       01/04/18 0840    IADL    Medications independent    Meal Preparation independent    Housekeeping independent    Laundry independent    Shopping independent    Oral Care independent            Psychosocial     None            Abuse/Neglect     None            Legal     None            Substance Abuse     None            Patient Forms     None          Katt Blood

## 2018-01-04 NOTE — PROGRESS NOTES
Patient Care Team:  NEHEMIAH Parra as PCP - General    Chief complaint   LEFT FOOT ABSCESS, CELLULITIS  Subjective .     One day status post I&D of the left foot.  Seen at bedside today with his wife.  Sitting up resting well.  States it feels much better than it did previously.  Says he had 1 episode of sharp pain but aside from that he's doing okay.  Denies any constitutional symptoms.    Review of Systems  All systems were reviewed and negative except for chief complaint.    History  Past Medical History:   Diagnosis Date   • Depression    • Diabetes mellitus    • Hypertension    • Injury of back    • Neuropathy    , Past Surgical History:   Procedure Laterality Date   • APPENDECTOMY     • BACK SURGERY     • CHOLECYSTECTOMY     • INCISION AND DRAINAGE FOOT Left 1/3/2018    Procedure: INCISION AND DRAINAGE FOOT ABSCESS, GREAT TOE;  Surgeon: Sammy Antoine DPM;  Location: [facility];  Service:    • SCROTAL SURGERY     , Family History   Problem Relation Age of Onset   • Diabetes Mother    • COPD Mother    • Diabetes Father    , Social History   Substance Use Topics   • Smoking status: Current Every Day Smoker     Packs/day: 1.00     Types: Cigarettes   • Smokeless tobacco: Never Used   • Alcohol use No   , Prescriptions Prior to Admission   Medication Sig Dispense Refill Last Dose   • buPROPion SR (WELLBUTRIN SR) 150 MG 12 hr tablet Take 150 mg by mouth Daily.   1/2/2018 at Unknown time   • Canagliflozin (INVOKANA) 300 MG tablet Take 300 mg by mouth Daily.   1/2/2018 at Unknown time   • doxycycline (DORYX) 100 MG enteric coated tablet Take 1 tablet by mouth 2 (Two) Times a Day for 10 days. 14 tablet 0 1/1/2018 at Unknown time   • DULoxetine (CYMBALTA) 60 MG capsule Take 60 mg by mouth Daily.   1/2/2018 at Unknown time   • furosemide (LASIX) 40 MG tablet Take 40 mg by mouth Daily.   1/1/2018 at Unknown time   • glimepiride (AMARYL) 4 MG tablet Take 8 mg by mouth Every Morning Before Breakfast.   1/1/2018  at Unknown time   • ibuprofen (ADVIL,MOTRIN) 600 MG tablet Take 1 tablet by mouth Every 8 (Eight) Hours As Needed for Mild Pain . 12 tablet 0 Past Week at Unknown time   • lisinopril (PRINIVIL,ZESTRIL) 40 MG tablet Take 40 mg by mouth Daily.   1/1/2018 at Unknown time   • metFORMIN (GLUCOPHAGE) 500 MG tablet Take 1,000 mg by mouth 2 (Two) Times a Day With Meals.   1/2/2018 at Unknown time   • oxyCODONE-acetaminophen (PERCOCET)  MG per tablet Take 1 tablet by mouth Every 6 (Six) Hours As Needed for Moderate Pain .   1/1/2018 at Unknown time   • pregabalin (LYRICA) 100 MG capsule Take 150 mg by mouth 3 (Three) Times a Day.   1/1/2018 at Unknown time   • acetaminophen (TYLENOL 8 HOUR) 650 MG 8 hr tablet Take 1 tablet by mouth Every 8 (Eight) Hours As Needed for Mild Pain . 12 tablet 0 Unknown at Unknown time   • Insulin Glargine (TOUJEO SOLOSTAR SC) Inject 10 Units under the skin Daily.   Unknown at Unknown time   • Insulin NPH Isophane & Regular (NOVOLIN 70/30 SC) Inject 20 Units under the skin 3 (Three) Times a Day Before Meals.   Unknown at Unknown time   • ondansetron ODT (ZOFRAN-ODT) 4 MG disintegrating tablet Take 1 tablet by mouth Every 8 (Eight) Hours As Needed for Nausea or Vomiting. (Patient not taking: Reported on 1/3/2018) 8 tablet 0 Unknown at Unknown time   , Scheduled Meds:    buPROPion  mg Oral Daily   DULoxetine 60 mg Oral Daily   enoxaparin 40 mg Subcutaneous Q24H   furosemide 40 mg Oral Daily   glipiZIDE 10 mg Oral QAM AC   insulin aspart 0-14 Units Subcutaneous 4x Daily AC & at Bedtime   lisinopril 40 mg Oral Daily   metFORMIN 500 mg Oral BID With Meals   piperacillin-tazobactam 3.375 g Intravenous Q6H   pregabalin 150 mg Oral TID   vancomycin 2,000 mg Intravenous Q12H   Pharmacy Consult  Does not apply Once   , Continuous Infusions:    Pharmacy to dose vancomycin    Pharmacy to Dose Zosyn    , PRN Meds:  •  acetaminophen  •  dextrose  •  dextrose  •  glucagon (human recombinant)  •   "ondansetron  •  ondansetron ODT  •  oxyCODONE-acetaminophen  •  Pharmacy to dose vancomycin  •  Pharmacy to Dose Zosyn  •  sodium chloride  •  sodium chloride and Allergies:  Review of patient's allergies indicates no known allergies.    Objective     Vital Signs   /68 (BP Location: Right arm, Patient Position: Sitting)  Pulse 83  Temp 98.1 °F (36.7 °C) (Oral)   Resp 16  Ht 182.9 cm (72\")  Wt 125 kg (275 lb 1.6 oz)  SpO2 98%  BMI 37.31 kg/m2    Physical Exam:AA0X3, NAD, AFVSS  PERRLA  CN 2-12 INTACT BL  He's grossly neurovascularly intact to the left foot.  Pulses are palpable.  The left foot warm.  There is some slight blanchable erythema to the medial forefoot.  Packing is intact to both incisions.  There is dried bloody drainage.  Signs of cellulitis seem to be receding and decreasing.  He has full range of motion of his left foot and ankle.     Results Review:OR CX POSITIVE FOR GROUP B STREP.  Blood glucose levels are still high.  White blood cell count this morning is 15.  Liver function tests are still extremely elevated.  Imaging Results: No new imaging.    Assessment/Plan   42-year-old diabetic male 1 day status post I&D of the left foot, probable osteomyelitis of the hallux  Active Problems:    Diabetic ulcer of toe of left foot associated with type 2 diabetes mellitus    Cellulitis of left lower extremity  Dressing was changed today.  I discussed with him his final treatment plan.  I explained we will await final culture results to determine if he can be placed on by mouth versus iv antibiotics.  I explained it would be my recommendation to treat this as if it were osteomyelitis given the close proximity to the bone and joint as well as the potential for Royal's abscess noted on MRI.  I explained we will consider final washout and wound closure tomorrow afternoon or Saturday morning and hopefully he can be discharged by Saturday at the latest.  Continue antibiotics per primary.  Keep dressing " intact.  Continue weightbearing to tolerance with his Darco shoe.  Tells me that he had to go outside this morning to smoke and I counseled him and cautioned him about smoking and potential wound complications.  Continue to follow.  DVT prophylaxis per primary.  He has a lesion on his right great toe that he would like for me to look at as well.          Sammy Antoine DPM  01/04/18  12:34 PM

## 2018-01-04 NOTE — PLAN OF CARE
Problem: Patient Care Overview (Adult)  Goal: Plan of Care Review  Outcome: Ongoing (interventions implemented as appropriate)   01/04/18 0515   Coping/Psychosocial Response Interventions   Plan Of Care Reviewed With patient   Patient Care Overview   Progress improving       Problem: Diabetes, Type 2 (Adult)  Goal: Signs and Symptoms of Listed Potential Problems Will be Absent or Manageable (Diabetes, Type 2)  Outcome: Ongoing (interventions implemented as appropriate)   01/04/18 0515   Diabetes, Type 2   Problems Assessed (Type 2 Diabetes) all   Problems Present (Type 2 Diabetes) impaired glycemic control;situational response       Problem: Infection, Risk/Actual (Adult)  Goal: Infection Prevention/Resolution  Outcome: Ongoing (interventions implemented as appropriate)   01/04/18 0515   Infection, Risk/Actual (Adult)   Infection Prevention/Resolution making progress toward outcome       Problem: Skin Integrity Impairment, Risk/Actual (Adult)  Goal: Skin Integrity/Wound Healing  Outcome: Ongoing (interventions implemented as appropriate)   01/04/18 0515   Skin Integrity Impairment, Risk/Actual (Adult)   Skin Integrity/Wound Healing making progress toward outcome       Problem: Perioperative Period (Adult)  Goal: Signs and Symptoms of Listed Potential Problems Will be Absent or Manageable (Perioperative Period)  Outcome: Outcome(s) achieved Date Met: 01/04/18

## 2018-01-05 ENCOUNTER — ANESTHESIA EVENT (OUTPATIENT)
Dept: PERIOP | Facility: HOSPITAL | Age: 43
End: 2018-01-05

## 2018-01-05 LAB
ACINETOBACTER SCREEN CX: NORMAL
ALBUMIN SERPL-MCNC: 3.8 G/DL (ref 3.5–5)
ANION GAP SERPL CALCULATED.3IONS-SCNC: 14 MMOL/L
BACTERIA SPEC AEROBE CULT: ABNORMAL
BACTERIA SPEC AEROBE CULT: ABNORMAL
BASOPHILS # BLD AUTO: 0.05 10*3/MM3 (ref 0–0.2)
BASOPHILS NFR BLD AUTO: 0.4 % (ref 0–2.5)
BUN BLD-MCNC: 12 MG/DL (ref 7–20)
BUN/CREAT SERPL: 15 (ref 6.3–21.9)
CALCIUM SPEC-SCNC: 9 MG/DL (ref 8.4–10.2)
CHLORIDE SERPL-SCNC: 102 MMOL/L (ref 98–107)
CO2 SERPL-SCNC: 28 MMOL/L (ref 26–30)
CREAT BLD-MCNC: 0.8 MG/DL (ref 0.6–1.3)
DEPRECATED RDW RBC AUTO: 41.1 FL (ref 37–54)
EOSINOPHIL # BLD AUTO: 0.45 10*3/MM3 (ref 0–0.7)
EOSINOPHIL NFR BLD AUTO: 3.9 % (ref 0–7)
ERYTHROCYTE [DISTWIDTH] IN BLOOD BY AUTOMATED COUNT: 12.2 % (ref 11.5–14.5)
GFR SERPL CREATININE-BSD FRML MDRD: 107 ML/MIN/1.73
GLUCOSE BLD-MCNC: 189 MG/DL (ref 74–98)
GLUCOSE BLDC GLUCOMTR-MCNC: 168 MG/DL (ref 70–130)
GLUCOSE BLDC GLUCOMTR-MCNC: 171 MG/DL (ref 70–130)
GLUCOSE BLDC GLUCOMTR-MCNC: 200 MG/DL (ref 70–130)
GLUCOSE BLDC GLUCOMTR-MCNC: 256 MG/DL (ref 70–130)
GLUCOSE BLDC GLUCOMTR-MCNC: 321 MG/DL (ref 70–130)
HCT VFR BLD AUTO: 46.7 % (ref 42–52)
HGB BLD-MCNC: 15.8 G/DL (ref 14–18)
IMM GRANULOCYTES # BLD: 0.08 10*3/MM3 (ref 0–0.06)
IMM GRANULOCYTES NFR BLD: 0.7 % (ref 0–0.6)
LYMPHOCYTES # BLD AUTO: 3.29 10*3/MM3 (ref 0.6–3.4)
LYMPHOCYTES NFR BLD AUTO: 28.7 % (ref 10–50)
MCH RBC QN AUTO: 31 PG (ref 27–31)
MCHC RBC AUTO-ENTMCNC: 33.8 G/DL (ref 30–37)
MCV RBC AUTO: 91.7 FL (ref 80–94)
MONOCYTES # BLD AUTO: 1.1 10*3/MM3 (ref 0–0.9)
MONOCYTES NFR BLD AUTO: 9.6 % (ref 0–12)
MRSA SPEC QL CULT: NORMAL
NEUTROPHILS # BLD AUTO: 6.51 10*3/MM3 (ref 2–6.9)
NEUTROPHILS NFR BLD AUTO: 56.7 % (ref 37–80)
NRBC BLD MANUAL-RTO: 0 /100 WBC (ref 0–0)
PHOSPHATE SERPL-MCNC: 4.2 MG/DL (ref 2.5–4.5)
PLATELET # BLD AUTO: 219 10*3/MM3 (ref 130–400)
PMV BLD AUTO: 9.7 FL (ref 6–12)
POTASSIUM BLD-SCNC: 4 MMOL/L (ref 3.5–5.1)
RBC # BLD AUTO: 5.09 10*6/MM3 (ref 4.7–6.1)
SODIUM BLD-SCNC: 140 MMOL/L (ref 137–145)
STREP GROUPING: ABNORMAL
VRE SPEC QL CULT: NORMAL
WBC NRBC COR # BLD: 11.48 10*3/MM3 (ref 4.8–10.8)

## 2018-01-05 PROCEDURE — 25010000002 VANCOMYCIN PER 500 MG: Performed by: INTERNAL MEDICINE

## 2018-01-05 PROCEDURE — 25010000002 PIPERACILLIN SOD-TAZOBACTAM PER 1 G: Performed by: INTERNAL MEDICINE

## 2018-01-05 PROCEDURE — 82962 GLUCOSE BLOOD TEST: CPT

## 2018-01-05 PROCEDURE — 85025 COMPLETE CBC W/AUTO DIFF WBC: CPT | Performed by: INTERNAL MEDICINE

## 2018-01-05 PROCEDURE — 99232 SBSQ HOSP IP/OBS MODERATE 35: CPT | Performed by: INTERNAL MEDICINE

## 2018-01-05 PROCEDURE — 99024 POSTOP FOLLOW-UP VISIT: CPT | Performed by: PODIATRIST

## 2018-01-05 PROCEDURE — 80069 RENAL FUNCTION PANEL: CPT | Performed by: INTERNAL MEDICINE

## 2018-01-05 PROCEDURE — 63710000001 INSULIN ASPART PER 5 UNITS: Performed by: INTERNAL MEDICINE

## 2018-01-05 PROCEDURE — 63710000001 INSULIN DETEMIR PER 5 UNITS: Performed by: INTERNAL MEDICINE

## 2018-01-05 RX ADMIN — PREGABALIN 150 MG: 75 CAPSULE ORAL at 18:05

## 2018-01-05 RX ADMIN — DULOXETINE HYDROCHLORIDE 60 MG: 30 CAPSULE, DELAYED RELEASE ORAL at 09:30

## 2018-01-05 RX ADMIN — INSULIN DETEMIR 10 UNITS: 100 INJECTION, SOLUTION SUBCUTANEOUS at 22:24

## 2018-01-05 RX ADMIN — INSULIN ASPART 5 UNITS: 100 INJECTION, SOLUTION INTRAVENOUS; SUBCUTANEOUS at 22:21

## 2018-01-05 RX ADMIN — VANCOMYCIN HYDROCHLORIDE 2000 MG: 500 INJECTION, POWDER, LYOPHILIZED, FOR SOLUTION INTRAVENOUS at 13:34

## 2018-01-05 RX ADMIN — INSULIN ASPART 8 UNITS: 100 INJECTION, SOLUTION INTRAVENOUS; SUBCUTANEOUS at 18:06

## 2018-01-05 RX ADMIN — LISINOPRIL 40 MG: 20 TABLET ORAL at 09:30

## 2018-01-05 RX ADMIN — OXYCODONE HYDROCHLORIDE AND ACETAMINOPHEN 1 TABLET: 10; 325 TABLET ORAL at 13:45

## 2018-01-05 RX ADMIN — PREGABALIN 150 MG: 75 CAPSULE ORAL at 09:29

## 2018-01-05 RX ADMIN — BUPROPION HYDROCHLORIDE 150 MG: 150 TABLET, FILM COATED, EXTENDED RELEASE ORAL at 09:29

## 2018-01-05 RX ADMIN — OXYCODONE HYDROCHLORIDE AND ACETAMINOPHEN 1 TABLET: 10; 325 TABLET ORAL at 22:21

## 2018-01-05 RX ADMIN — FUROSEMIDE 40 MG: 40 TABLET ORAL at 09:29

## 2018-01-05 RX ADMIN — INSULIN ASPART 10 UNITS: 100 INJECTION, SOLUTION INTRAVENOUS; SUBCUTANEOUS at 11:53

## 2018-01-05 RX ADMIN — METFORMIN HYDROCHLORIDE 500 MG: 500 TABLET, FILM COATED ORAL at 22:21

## 2018-01-05 RX ADMIN — PREGABALIN 150 MG: 75 CAPSULE ORAL at 22:21

## 2018-01-05 RX ADMIN — INSULIN ASPART 3 UNITS: 100 INJECTION, SOLUTION INTRAVENOUS; SUBCUTANEOUS at 06:43

## 2018-01-05 RX ADMIN — GLIPIZIDE 10 MG: 10 TABLET ORAL at 06:43

## 2018-01-05 RX ADMIN — TAZOBACTAM SODIUM AND PIPERACILLIN SODIUM 3.38 G: 375; 3 INJECTION, SOLUTION INTRAVENOUS at 18:05

## 2018-01-05 RX ADMIN — TAZOBACTAM SODIUM AND PIPERACILLIN SODIUM 3.38 G: 375; 3 INJECTION, SOLUTION INTRAVENOUS at 13:33

## 2018-01-05 RX ADMIN — OXYCODONE HYDROCHLORIDE AND ACETAMINOPHEN 1 TABLET: 10; 325 TABLET ORAL at 09:29

## 2018-01-05 RX ADMIN — TAZOBACTAM SODIUM AND PIPERACILLIN SODIUM 3.38 G: 375; 3 INJECTION, SOLUTION INTRAVENOUS at 05:54

## 2018-01-05 RX ADMIN — VANCOMYCIN HYDROCHLORIDE 2000 MG: 500 INJECTION, POWDER, LYOPHILIZED, FOR SOLUTION INTRAVENOUS at 00:45

## 2018-01-05 RX ADMIN — TAZOBACTAM SODIUM AND PIPERACILLIN SODIUM 3.38 G: 375; 3 INJECTION, SOLUTION INTRAVENOUS at 22:21

## 2018-01-05 NOTE — PROGRESS NOTES
[facility]    [facility]    Name:  Wai Medeiros   Age:  42 y.o.  Sex:  male  :  1975  MRN:  [id number]   Visit Number:  [id number]  Admission Date:  2018  Date Of Service:  18  Primary Care Physician:  NEHEMIAH Parra     LOS: 2 days :  Patient Care Team:  NEHEMIAH Parra as PCP - General:    History taken from:     patient    Chief Complaint:      Headache    Subjective     Interval History:     Patient seen today.  Patient is a 42-year-old male with diabetes type 2, hypertension, depression who developed an infection in his left great toe.  He was given amoxicillin and then doxycycline as an outpatient.  He was taken for I&D by Dr. Antoine, and placed on vancomycin and Zosyn.  This grew out group B strep.  He is improving.  Vancomycin has been discontinued.  Dr. Antoine is to take the patient to surgery tomorrow.  Patient denies any chest pressure, shortness breath, nausea, vomiting, or pain.  He did have a headache last night, has not had one since.  His foot pain is under control.    Review of Systems:     All systems were reviewed and negative except for:  Musculoskeletal: positive for  Left great toe pain    Objective     Vital Signs:    Temp:  [97.8 °F (36.6 °C)-98.6 °F (37 °C)] 98.6 °F (37 °C)  Heart Rate:  [75-94] 85  Resp:  [16-18] 18  BP: (112-127)/(62-79) 127/79    Physical Exam:      General Appearance:    Alert, cooperative, in no acute distress   Head:    Normocephalic, without obvious abnormality, atraumatic   Eyes:            Lids and lashes normal, conjunctivae and sclerae normal, no   icterus, no pallor, corneas clear, PERRLA   Ears:    Ears appear intact with no abnormalities noted   Throat:   No oral lesions, no thrush, oral mucosa moist   Neck:   No adenopathy, supple, trachea midline, no thyromegaly, no   carotid bruit, no JVD   Back:     No kyphosis present, no scoliosis present, no skin lesions,      erythema or scars,  no tenderness to percussion or                   palpation,   range of motion normal   Lungs:     Clear to auscultation,respirations regular, even and                  unlabored    Heart:    Regular rhythm and normal rate, normal S1 and S2, no            murmur, no gallop, no rub, no click   Chest Wall:    No abnormalities observed   Abdomen:     Normal bowel sounds, no masses, no organomegaly, soft        non-tender, non-distended, no guarding, no rebound                tenderness   Rectal:     Deferred   Extremities:   Moves all extremities well, no edema, no cyanosis, Redness and left leg has resolved.     Pulses:   Pulses palpable and equal bilaterally   Skin:   No bleeding, bruising or rash   Lymph nodes:   No palpable adenopathy   Neurologic:   Cranial nerves 2 - 12 grossly intact, sensation intact, DTR       present and equal bilaterally        Results Review:      I reviewed the patient's new clinical results.    Labs:    Lab Results (last 24 hours)     Procedure Component Value Units Date/Time    POC Glucose Once [[id number]]  (Abnormal) Collected:  01/04/18 1603    Specimen:  Blood Updated:  01/04/18 1646     Glucose 241 (H) mg/dL       Serial Number: [id number]:  [id number]       POC Glucose Once [[id number]]  (Abnormal) Collected:  01/04/18 1956    Specimen:  Blood Updated:  01/04/18 2030     Glucose 334 (H) mg/dL       Serial Number: [id number]:  [id number]       CBC & Differential [[id number]] Collected:  01/05/18 0522    Specimen:  Blood Updated:  01/05/18 0549    Narrative:       The following orders were created for panel order CBC & Differential.  Procedure                               Abnormality         Status                     ---------                               -----------         ------                     CBC Auto Differential[[id number]]        Abnormal            Final result                 Please view results for these tests on the individual orders.    CBC Auto Differential  [[id number]]  (Abnormal) Collected:  01/05/18 0522    Specimen:  Blood Updated:  01/05/18 0549     WBC 11.48 (H) 10*3/mm3      RBC 5.09 10*6/mm3      Hemoglobin 15.8 g/dL      Hematocrit 46.7 %      MCV 91.7 fL      MCH 31.0 pg      MCHC 33.8 g/dL      RDW 12.2 %      RDW-SD 41.1 fl      MPV 9.7 fL      Platelets 219 10*3/mm3      Neutrophil % 56.7 %      Lymphocyte % 28.7 %      Monocyte % 9.6 %      Eosinophil % 3.9 %      Basophil % 0.4 %      Immature Grans % 0.7 (H) %      Neutrophils, Absolute 6.51 10*3/mm3      Lymphocytes, Absolute 3.29 10*3/mm3      Monocytes, Absolute 1.10 (H) 10*3/mm3      Eosinophils, Absolute 0.45 10*3/mm3      Basophils, Absolute 0.05 10*3/mm3      Immature Grans, Absolute 0.08 (H) 10*3/mm3      nRBC 0.0 /100 WBC     Renal Function Panel [[id number]]  (Abnormal) Collected:  01/05/18 0522    Specimen:  Blood Updated:  01/05/18 0615     Glucose 189 (H) mg/dL      BUN 12 mg/dL      Creatinine 0.80 mg/dL      Sodium 140 mmol/L      Potassium 4.0 mmol/L      Chloride 102 mmol/L      CO2 28.0 mmol/L      Calcium 9.0 mg/dL      Albumin 3.80 g/dL      Phosphorus 4.2 mg/dL      Anion Gap 14.0 mmol/L      BUN/Creatinine Ratio 15.0     eGFR Non African Amer 107 mL/min/1.73     Narrative:       Abnormal estimated GFR should be followed by more specific studies to confirm end stage chronic renal disease. The equation used for calculation may not be accurate for patients less than 19 years old, greater than 70 years old, patients at extremes of weight, malnutrition, or with acute renal dysfunction.    POC Glucose Once [[id number]]  (Abnormal) Collected:  01/05/18 0355    Specimen:  Blood Updated:  01/05/18 0615     Glucose 171 (H) mg/dL       Serial Number: [id number]:  [id number]       POC Glucose Once [[id number]]  (Abnormal) Collected:  01/05/18 0607    Specimen:  Blood Updated:  01/05/18 0615     Glucose 168 (H) mg/dL       Serial Number: [id number]:  [id number]       Acinetobacter Screen -  Swab, Nares [[id number]]  (Normal) Collected:  01/02/18 1315    Specimen:  Swab from Axilla, Left Updated:  01/05/18 0901     ACINETOBACTER SCREEN CX No Acinetobacter isolated    VRE Culture - Swab, Per Rectum [[id number]]  (Normal) Collected:  01/02/18 1315    Specimen:  Swab from Per Rectum Updated:  01/05/18 0902     VRE SCREEN CX No Vancomycin Resistant Enterococcus Isolated    Wound Culture - Wound, Toe, Left [[id number]]  (Normal) Collected:  01/03/18 1508    Specimen:  Wound from Toe, Left Updated:  01/05/18 0911     Wound Culture No growth    MRSA Screen Culture - Swab, Nares [[id number]]  (Normal) Collected:  01/02/18 1315    Specimen:  Swab from Nares Updated:  01/05/18 0945     MRSA SCREEN CX No Methicillin Resistant Staphylococcus aureus isolated    Wound Culture - Wound, Toe, Left [[id number]]  (Abnormal)  (Susceptibility) Collected:  01/02/18 1316    Specimen:  Wound from Toe, Left Updated:  01/05/18 1107     Wound Culture --      Light growth (2+) Streptococcus agalactiae (Group B) (A)      Sensitivity to follow.     This isolate does not demonstrate inducible clindamycin resistance in vitro.          STREP GROUPING B    Susceptibility      Streptococcus agalactiae (Group B)     CHARLI     Amoxicillin + Clavulanate <=4/2 ug/ml     Ampicillin + Sulbactam <=8/4 ug/ml     Cefazolin <=4 ug/ml     Ciprofloxacin <=1 ug/ml     Daptomycin <=0.5 ug/ml Susceptible     Levofloxacin <=1 ug/ml Susceptible     Linezolid <=1 ug/ml Susceptible     Moxifloxacin <=0.5 ug/ml     Oxacillin <=0.25 ug/ml     Penicillin G 0.12 ug/ml Susceptible     Quinupristin + Dalfopristin <=1 ug/ml     Rifampin <=1 ug/ml     Trimethoprim + Sulfamethoxazole <=0.5/9.5 ug/ml     Vancomycin 0.5 ug/ml Susceptible                    POC Glucose Once [[id number]]  (Abnormal) Collected:  01/05/18 1057    Specimen:  Blood Updated:  01/05/18 1110     Glucose 321 (H) mg/dL       Serial Number: [id number]:  [id number]              Radiology:    Imaging  Results (last 24 hours)     ** No results found for the last 24 hours. **          Medication Review:       buPROPion  mg Oral Daily   DULoxetine 60 mg Oral Daily   enoxaparin 40 mg Subcutaneous Q24H   furosemide 40 mg Oral Daily   glipiZIDE 10 mg Oral QAM AC   insulin aspart 0-14 Units Subcutaneous 4x Daily AC & at Bedtime   lisinopril 40 mg Oral Daily   metFORMIN 500 mg Oral BID With Meals   piperacillin-tazobactam 3.375 g Intravenous Q6H   pregabalin 150 mg Oral TID   Pharmacy Consult  Does not apply Once         Pharmacy to Dose Zosyn        Assessment/Plan     Problem List Items Addressed This Visit     Diabetic ulcer of toe of left foot associated with type 2 diabetes mellitus - Primary    Relevant Medications    metFORMIN (GLUCOPHAGE) 500 MG tablet    glimepiride (AMARYL) 4 MG tablet    Canagliflozin (INVOKANA) 300 MG tablet    Insulin NPH Isophane & Regular (NOVOLIN 70/30 SC)    Insulin Glargine (TOUJEO SOLOSTAR SC)    Other Relevant Orders    Case Request (Completed)    Anaerobic Culture - Wound, Toe, Left    Gram Stain (Completed)    Wound Culture - Wound, Toe, Left (Completed)    Case Request (Completed)    Cellulitis of left lower extremity    Overview     Added automatically from request for surgery [id number]         Relevant Orders    Case Request (Completed)    Anaerobic Culture - Wound, Toe, Left    Gram Stain (Completed)    Wound Culture - Wound, Toe, Left (Completed)    Case Request (Completed)          1.  Diabetic foot infection of the left great toe, Growing out group B strep  2.  Associated cellulitis  3.  Increased LFTs Due to fatty liver disease.  4.  Morbid obesity  5.  Hypertension, essential  6.  Chronic low back pain  7.  History of MRSA  8.  Diabetes type 2 uncontrolled    Plan:    Discontinue vancomycin.  Continue Zosyn.  Patient will be placed on oral Augmentin at discharge.  Dr. Antoine will take the patient to surgery tomorrow then he can be discharged home.  Add Levemir 10 units at  night.  Further recommendations will depend on the clinical course.    Stuart Hair,   01/05/18  3:36 PM

## 2018-01-05 NOTE — PLAN OF CARE
Problem: Patient Care Overview (Adult)  Goal: Plan of Care Review  Outcome: Ongoing (interventions implemented as appropriate)   01/05/18 0583   Coping/Psychosocial Response Interventions   Plan Of Care Reviewed With patient   Patient Care Overview   Progress no change   Outcome Evaluation   Outcome Summary/Follow up Plan no acute events overnight. patient continues to complain of intermittent headaches. continue to monitor.      Goal: Discharge Needs Assessment  Outcome: Ongoing (interventions implemented as appropriate)      Problem: Diabetes, Type 2 (Adult)  Goal: Signs and Symptoms of Listed Potential Problems Will be Absent or Manageable (Diabetes, Type 2)  Outcome: Ongoing (interventions implemented as appropriate)      Problem: Infection, Risk/Actual (Adult)  Goal: Infection Prevention/Resolution  Outcome: Ongoing (interventions implemented as appropriate)      Problem: Skin Integrity Impairment, Risk/Actual (Adult)  Goal: Skin Integrity/Wound Healing  Outcome: Ongoing (interventions implemented as appropriate)

## 2018-01-05 NOTE — PLAN OF CARE
Problem: Patient Care Overview (Adult)  Goal: Plan of Care Review  Outcome: Ongoing (interventions implemented as appropriate)   01/05/18 1812   Coping/Psychosocial Response Interventions   Plan Of Care Reviewed With patient;spouse   Patient Care Overview   Progress progress toward functional goals as expected   Outcome Evaluation   Outcome Summary/Follow up Plan Patient alert and cooperative. Pain meds administered times 2. Denies nausea. Continue plan of care.

## 2018-01-05 NOTE — ANESTHESIA PREPROCEDURE EVALUATION
Anesthesia Evaluation     Patient summary reviewed and Nursing notes reviewed   no history of anesthetic complications:  NPO Solid Status: > 8 hours  NPO Liquid Status: > 8 hours     Airway   Mallampati: II  TM distance: >3 FB  Neck ROM: full  Dental      Pulmonary    Cardiovascular   Exercise tolerance: good (4-7 METS)    ECG reviewed  Rhythm: regular  Rate: normal    (+) hypertension,     ROS comment: EKG Normal    Neuro/Psych  (+) psychiatric history Anxiety and Depression,     GI/Hepatic/Renal/Endo    (+) obesity,  diabetes mellitus type 2 poorly controlled using insulin,     Musculoskeletal     Abdominal    Substance History      OB/GYN          Other                                              Anesthesia Plan    ASA 2     MAC   (Pt told that intravenous sedation will be used as the primary anesthetic along with local anesthesia if necessary. Every effort will be made to make sure the patient is comfortable.     The patient was told they may or may not have recall for the procedure. It was further explained that if the MAC was not adequate that a general anesthetic with either an LMA or endotracheal tube would be required.   Will proceed with the plan of care.)  intravenous induction   Anesthetic plan and risks discussed with patient.

## 2018-01-06 ENCOUNTER — ANESTHESIA (OUTPATIENT)
Dept: PERIOP | Facility: HOSPITAL | Age: 43
End: 2018-01-06

## 2018-01-06 VITALS
HEIGHT: 72 IN | HEART RATE: 85 BPM | SYSTOLIC BLOOD PRESSURE: 115 MMHG | RESPIRATION RATE: 15 BRPM | DIASTOLIC BLOOD PRESSURE: 65 MMHG | BODY MASS INDEX: 35.62 KG/M2 | OXYGEN SATURATION: 96 % | WEIGHT: 263 LBS | TEMPERATURE: 97.8 F

## 2018-01-06 LAB
GLUCOSE BLDC GLUCOMTR-MCNC: 138 MG/DL (ref 70–130)
GLUCOSE BLDC GLUCOMTR-MCNC: 177 MG/DL (ref 70–130)
GRAM STN SPEC: NORMAL
GRAM STN SPEC: NORMAL

## 2018-01-06 PROCEDURE — 82962 GLUCOSE BLOOD TEST: CPT

## 2018-01-06 PROCEDURE — 0HRNXJZ REPLACEMENT OF LEFT FOOT SKIN WITH SYNTHETIC SUBSTITUTE, EXTERNAL APPROACH: ICD-10-PCS | Performed by: PODIATRIST

## 2018-01-06 PROCEDURE — 15275 SKIN SUB GRAFT FACE/NK/HF/G: CPT | Performed by: PODIATRIST

## 2018-01-06 PROCEDURE — 25010000002 MIDAZOLAM PER 1 MG: Performed by: NURSE ANESTHETIST, CERTIFIED REGISTERED

## 2018-01-06 PROCEDURE — 25010000002 PIPERACILLIN SOD-TAZOBACTAM PER 1 G: Performed by: INTERNAL MEDICINE

## 2018-01-06 PROCEDURE — 25010000002 PROPOFOL 10 MG/ML EMULSION: Performed by: NURSE ANESTHETIST, CERTIFIED REGISTERED

## 2018-01-06 PROCEDURE — 25010000002 FENTANYL CITRATE (PF) 100 MCG/2ML SOLUTION: Performed by: NURSE ANESTHETIST, CERTIFIED REGISTERED

## 2018-01-06 PROCEDURE — 99238 HOSP IP/OBS DSCHRG MGMT 30/<: CPT | Performed by: INTERNAL MEDICINE

## 2018-01-06 PROCEDURE — 0JBR0ZZ EXCISION OF LEFT FOOT SUBCUTANEOUS TISSUE AND FASCIA, OPEN APPROACH: ICD-10-PCS | Performed by: PODIATRIST

## 2018-01-06 PROCEDURE — 87205 SMEAR GRAM STAIN: CPT | Performed by: PODIATRIST

## 2018-01-06 PROCEDURE — 87075 CULTR BACTERIA EXCEPT BLOOD: CPT | Performed by: PODIATRIST

## 2018-01-06 PROCEDURE — 63710000001 INSULIN REGULAR HUMAN PER 5 UNITS: Performed by: INTERNAL MEDICINE

## 2018-01-06 PROCEDURE — 13160 SEC CLSR SURG WND/DEHSN XTN: CPT | Performed by: PODIATRIST

## 2018-01-06 PROCEDURE — 87070 CULTURE OTHR SPECIMN AEROBIC: CPT | Performed by: PODIATRIST

## 2018-01-06 DEVICE — ALLOGRFT AMNIO AMNIOFIX 2X3CM: Type: IMPLANTABLE DEVICE | Site: FOOT | Status: FUNCTIONAL

## 2018-01-06 RX ORDER — PROPOFOL 10 MG/ML
VIAL (ML) INTRAVENOUS AS NEEDED
Status: DISCONTINUED | OUTPATIENT
Start: 2018-01-06 | End: 2018-01-06 | Stop reason: SURG

## 2018-01-06 RX ORDER — SODIUM CHLORIDE 0.9 % (FLUSH) 0.9 %
1-10 SYRINGE (ML) INJECTION AS NEEDED
Status: DISCONTINUED | OUTPATIENT
Start: 2018-01-06 | End: 2018-01-06 | Stop reason: HOSPADM

## 2018-01-06 RX ORDER — FENTANYL CITRATE 50 UG/ML
INJECTION, SOLUTION INTRAMUSCULAR; INTRAVENOUS AS NEEDED
Status: DISCONTINUED | OUTPATIENT
Start: 2018-01-06 | End: 2018-01-06 | Stop reason: SURG

## 2018-01-06 RX ORDER — AMOXICILLIN AND CLAVULANATE POTASSIUM 875; 125 MG/1; MG/1
1 TABLET, FILM COATED ORAL EVERY 12 HOURS SCHEDULED
Qty: 20 TABLET | Refills: 0 | Status: SHIPPED | OUTPATIENT
Start: 2018-01-06 | End: 2018-01-06

## 2018-01-06 RX ORDER — MIDAZOLAM HYDROCHLORIDE 1 MG/ML
INJECTION INTRAMUSCULAR; INTRAVENOUS AS NEEDED
Status: DISCONTINUED | OUTPATIENT
Start: 2018-01-06 | End: 2018-01-06 | Stop reason: SURG

## 2018-01-06 RX ORDER — AMOXICILLIN AND CLAVULANATE POTASSIUM 875; 125 MG/1; MG/1
1 TABLET, FILM COATED ORAL EVERY 12 HOURS SCHEDULED
Qty: 20 TABLET | Refills: 0 | Status: SHIPPED | OUTPATIENT
Start: 2018-01-06 | End: 2018-03-02 | Stop reason: SDUPTHER

## 2018-01-06 RX ORDER — KETAMINE HYDROCHLORIDE 50 MG/ML
INJECTION, SOLUTION, CONCENTRATE INTRAMUSCULAR; INTRAVENOUS AS NEEDED
Status: DISCONTINUED | OUTPATIENT
Start: 2018-01-06 | End: 2018-01-06 | Stop reason: SURG

## 2018-01-06 RX ORDER — SODIUM CHLORIDE 9 MG/ML
INJECTION, SOLUTION INTRAVENOUS CONTINUOUS PRN
Status: DISCONTINUED | OUTPATIENT
Start: 2018-01-06 | End: 2018-01-06 | Stop reason: SURG

## 2018-01-06 RX ADMIN — SODIUM CHLORIDE: 9 INJECTION, SOLUTION INTRAVENOUS at 08:27

## 2018-01-06 RX ADMIN — KETAMINE HYDROCHLORIDE 25 MG: 50 INJECTION, SOLUTION INTRAMUSCULAR; INTRAVENOUS at 09:03

## 2018-01-06 RX ADMIN — MIDAZOLAM HYDROCHLORIDE 2 MG: 1 INJECTION, SOLUTION INTRAMUSCULAR; INTRAVENOUS at 08:58

## 2018-01-06 RX ADMIN — PROPOFOL 50 MG: 10 INJECTION, EMULSION INTRAVENOUS at 09:07

## 2018-01-06 RX ADMIN — PROPOFOL 50 MG: 10 INJECTION, EMULSION INTRAVENOUS at 09:00

## 2018-01-06 RX ADMIN — OXYCODONE HYDROCHLORIDE AND ACETAMINOPHEN 1 TABLET: 10; 325 TABLET ORAL at 07:44

## 2018-01-06 RX ADMIN — FENTANYL CITRATE 100 MCG: 50 INJECTION, SOLUTION INTRAMUSCULAR; INTRAVENOUS at 08:58

## 2018-01-06 RX ADMIN — HUMAN INSULIN 3 UNITS: 100 INJECTION, SOLUTION SUBCUTANEOUS at 06:59

## 2018-01-06 RX ADMIN — TAZOBACTAM SODIUM AND PIPERACILLIN SODIUM 3.38 G: 375; 3 INJECTION, SOLUTION INTRAVENOUS at 11:00

## 2018-01-06 RX ADMIN — TAZOBACTAM SODIUM AND PIPERACILLIN SODIUM 3.38 G: 375; 3 INJECTION, SOLUTION INTRAVENOUS at 07:15

## 2018-01-06 RX ADMIN — OXYCODONE HYDROCHLORIDE AND ACETAMINOPHEN 1 TABLET: 10; 325 TABLET ORAL at 12:48

## 2018-01-06 NOTE — PLAN OF CARE
Problem: Patient Care Overview (Adult)  Goal: Plan of Care Review  Outcome: Ongoing (interventions implemented as appropriate)   01/06/18 1007   Outcome Evaluation   Outcome Summary/Follow up Plan PACU DISCHARE CRITERIA MET TO 4TH FLOOR IN STABLE CONDITION

## 2018-01-06 NOTE — PLAN OF CARE
Problem: Perioperative Period (Adult)  Intervention: Promote Pulmonary Hygiene and Secretion Clearance   01/06/18 0930   Positioning   Head Of Bed (HOB) Position HOB at 20-30 degrees   Activity   Activity Type bedrest     Intervention: Prevent Jenn-procedural Injury   01/06/18 0930   Positioning   Positioning semi-Fowlers;foot of bed elevated   Head Of Bed (HOB) Position HOB at 20-30 degrees     Intervention: Prevent/Manage DVT/VTE Risk   01/06/18 0930   Support Surgical/Anesthesia Recovery   Venous Thromboembolism Prevent/Manage plantar flexion performed     Intervention: Monitor/Manage Postoperative Bleeding   01/06/18 0930   Safety Interventions   Bleeding Management dressing monitored     Intervention: Promote Normothermia   01/06/18 0930   Cardiac Interventions   Warming Thermoregulation Maintenance skin exposure time minimized;warm blankets applied

## 2018-01-06 NOTE — PLAN OF CARE
Problem: Patient Care Overview (Adult)  Goal: Plan of Care Review  Outcome: Ongoing (interventions implemented as appropriate)   01/06/18 0323   Coping/Psychosocial Response Interventions   Plan Of Care Reviewed With patient   Patient Care Overview   Progress progress toward functional goals as expected   Outcome Evaluation   Outcome Summary/Follow up Plan Plan for patient to have surgery on left again, to have wound closed.      Goal: Adult Individualization and Mutuality  Outcome: Ongoing (interventions implemented as appropriate)    Goal: Discharge Needs Assessment  Outcome: Ongoing (interventions implemented as appropriate)      Problem: Diabetes, Type 2 (Adult)  Goal: Signs and Symptoms of Listed Potential Problems Will be Absent or Manageable (Diabetes, Type 2)  Outcome: Ongoing (interventions implemented as appropriate)      Problem: Infection, Risk/Actual (Adult)  Goal: Infection Prevention/Resolution  Outcome: Ongoing (interventions implemented as appropriate)      Problem: Skin Integrity Impairment, Risk/Actual (Adult)  Goal: Skin Integrity/Wound Healing  Outcome: Ongoing (interventions implemented as appropriate)

## 2018-01-06 NOTE — PROGRESS NOTES
Case Management Discharge Note         Discharge Placement     No information found        Other: Other    Discharge Codes: 01  Discharge to home

## 2018-01-06 NOTE — H&P (VIEW-ONLY)
[facility]    Name:  Wai Medeiros   Age:  42 y.o.  Sex:  male  :  1975  MRN:  [id number]   Visit Number:  [id number]  Admission Date:  2018  Date Of Service:  18  Primary Care Physician:  NEHEMIAH Parra     LOS: 2 days :  Patient Care Team:  NEHEMIAH Parra as PCP - General:    History taken from:     patient    Chief Complaint:      Headache    Subjective     Interval History:     Patient seen today.  Patient is a 42-year-old male with diabetes type 2, hypertension, depression who developed an infection in his left great toe.  He was given amoxicillin and then doxycycline as an outpatient.  He was taken for I&D by Dr. Antoine, and placed on vancomycin and Zosyn.  This grew out group B strep.  He is improving.  Vancomycin has been discontinued.  Dr. Antoine is to take the patient to surgery tomorrow.  Patient denies any chest pressure, shortness breath, nausea, vomiting, or pain.  He did have a headache last night, has not had one since.  His foot pain is under control.    Review of Systems:     All systems were reviewed and negative except for:  Musculoskeletal: positive for  Left great toe pain    Objective     Vital Signs:    Temp:  [97.8 °F (36.6 °C)-98.6 °F (37 °C)] 98.6 °F (37 °C)  Heart Rate:  [75-94] 85  Resp:  [16-18] 18  BP: (112-127)/(62-79) 127/79    Physical Exam:      General Appearance:    Alert, cooperative, in no acute distress   Head:    Normocephalic, without obvious abnormality, atraumatic   Eyes:            Lids and lashes normal, conjunctivae and sclerae normal, no   icterus, no pallor, corneas clear, PERRLA   Ears:    Ears appear intact with no abnormalities noted   Throat:   No oral lesions, no thrush, oral mucosa moist   Neck:   No adenopathy, supple, trachea midline, no thyromegaly, no   carotid bruit, no JVD   Back:     No kyphosis present, no scoliosis present, no skin lesions,      erythema or scars,  no tenderness to percussion or                   palpation,   range of motion normal   Lungs:     Clear to auscultation,respirations regular, even and                  unlabored    Heart:    Regular rhythm and normal rate, normal S1 and S2, no            murmur, no gallop, no rub, no click   Chest Wall:    No abnormalities observed   Abdomen:     Normal bowel sounds, no masses, no organomegaly, soft        non-tender, non-distended, no guarding, no rebound                tenderness   Rectal:     Deferred   Extremities:   Moves all extremities well, no edema, no cyanosis, Redness and left leg has resolved.     Pulses:   Pulses palpable and equal bilaterally   Skin:   No bleeding, bruising or rash   Lymph nodes:   No palpable adenopathy   Neurologic:   Cranial nerves 2 - 12 grossly intact, sensation intact, DTR       present and equal bilaterally        Results Review:      I reviewed the patient's new clinical results.    Labs:    Lab Results (last 24 hours)     Procedure Component Value Units Date/Time    POC Glucose Once [[id number]]  (Abnormal) Collected:  01/04/18 1603    Specimen:  Blood Updated:  01/04/18 1646     Glucose 241 (H) mg/dL       Serial Number: [id number]:  [id number]       POC Glucose Once [[id number]]  (Abnormal) Collected:  01/04/18 1956    Specimen:  Blood Updated:  01/04/18 2030     Glucose 334 (H) mg/dL       Serial Number: [id number]:  [id number]       CBC & Differential [[id number]] Collected:  01/05/18 0522    Specimen:  Blood Updated:  01/05/18 0549    Narrative:       The following orders were created for panel order CBC & Differential.  Procedure                               Abnormality         Status                     ---------                               -----------         ------                     CBC Auto Differential[[id number]]        Abnormal            Final result                 Please view results for these tests on the individual orders.    CBC Auto Differential  [[id number]]  (Abnormal) Collected:  01/05/18 0522    Specimen:  Blood Updated:  01/05/18 0549     WBC 11.48 (H) 10*3/mm3      RBC 5.09 10*6/mm3      Hemoglobin 15.8 g/dL      Hematocrit 46.7 %      MCV 91.7 fL      MCH 31.0 pg      MCHC 33.8 g/dL      RDW 12.2 %      RDW-SD 41.1 fl      MPV 9.7 fL      Platelets 219 10*3/mm3      Neutrophil % 56.7 %      Lymphocyte % 28.7 %      Monocyte % 9.6 %      Eosinophil % 3.9 %      Basophil % 0.4 %      Immature Grans % 0.7 (H) %      Neutrophils, Absolute 6.51 10*3/mm3      Lymphocytes, Absolute 3.29 10*3/mm3      Monocytes, Absolute 1.10 (H) 10*3/mm3      Eosinophils, Absolute 0.45 10*3/mm3      Basophils, Absolute 0.05 10*3/mm3      Immature Grans, Absolute 0.08 (H) 10*3/mm3      nRBC 0.0 /100 WBC     Renal Function Panel [[id number]]  (Abnormal) Collected:  01/05/18 0522    Specimen:  Blood Updated:  01/05/18 0615     Glucose 189 (H) mg/dL      BUN 12 mg/dL      Creatinine 0.80 mg/dL      Sodium 140 mmol/L      Potassium 4.0 mmol/L      Chloride 102 mmol/L      CO2 28.0 mmol/L      Calcium 9.0 mg/dL      Albumin 3.80 g/dL      Phosphorus 4.2 mg/dL      Anion Gap 14.0 mmol/L      BUN/Creatinine Ratio 15.0     eGFR Non African Amer 107 mL/min/1.73     Narrative:       Abnormal estimated GFR should be followed by more specific studies to confirm end stage chronic renal disease. The equation used for calculation may not be accurate for patients less than 19 years old, greater than 70 years old, patients at extremes of weight, malnutrition, or with acute renal dysfunction.    POC Glucose Once [[id number]]  (Abnormal) Collected:  01/05/18 0355    Specimen:  Blood Updated:  01/05/18 0615     Glucose 171 (H) mg/dL       Serial Number: [id number]:  [id number]       POC Glucose Once [[id number]]  (Abnormal) Collected:  01/05/18 0607    Specimen:  Blood Updated:  01/05/18 0615     Glucose 168 (H) mg/dL       Serial Number: [id number]:  [id number]       Acinetobacter Screen -  Swab, Nares [[id number]]  (Normal) Collected:  01/02/18 1315    Specimen:  Swab from Axilla, Left Updated:  01/05/18 0901     ACINETOBACTER SCREEN CX No Acinetobacter isolated    VRE Culture - Swab, Per Rectum [[id number]]  (Normal) Collected:  01/02/18 1315    Specimen:  Swab from Per Rectum Updated:  01/05/18 0902     VRE SCREEN CX No Vancomycin Resistant Enterococcus Isolated    Wound Culture - Wound, Toe, Left [[id number]]  (Normal) Collected:  01/03/18 1508    Specimen:  Wound from Toe, Left Updated:  01/05/18 0911     Wound Culture No growth    MRSA Screen Culture - Swab, Nares [[id number]]  (Normal) Collected:  01/02/18 1315    Specimen:  Swab from Nares Updated:  01/05/18 0945     MRSA SCREEN CX No Methicillin Resistant Staphylococcus aureus isolated    Wound Culture - Wound, Toe, Left [[id number]]  (Abnormal)  (Susceptibility) Collected:  01/02/18 1316    Specimen:  Wound from Toe, Left Updated:  01/05/18 1107     Wound Culture --      Light growth (2+) Streptococcus agalactiae (Group B) (A)      Sensitivity to follow.     This isolate does not demonstrate inducible clindamycin resistance in vitro.          STREP GROUPING B    Susceptibility      Streptococcus agalactiae (Group B)     CHARLI     Amoxicillin + Clavulanate <=4/2 ug/ml     Ampicillin + Sulbactam <=8/4 ug/ml     Cefazolin <=4 ug/ml     Ciprofloxacin <=1 ug/ml     Daptomycin <=0.5 ug/ml Susceptible     Levofloxacin <=1 ug/ml Susceptible     Linezolid <=1 ug/ml Susceptible     Moxifloxacin <=0.5 ug/ml     Oxacillin <=0.25 ug/ml     Penicillin G 0.12 ug/ml Susceptible     Quinupristin + Dalfopristin <=1 ug/ml     Rifampin <=1 ug/ml     Trimethoprim + Sulfamethoxazole <=0.5/9.5 ug/ml     Vancomycin 0.5 ug/ml Susceptible                    POC Glucose Once [[id number]]  (Abnormal) Collected:  01/05/18 1057    Specimen:  Blood Updated:  01/05/18 1110     Glucose 321 (H) mg/dL       Serial Number: [id number]:  [id number]              Radiology:    Imaging  Results (last 24 hours)     ** No results found for the last 24 hours. **          Medication Review:       buPROPion  mg Oral Daily   DULoxetine 60 mg Oral Daily   enoxaparin 40 mg Subcutaneous Q24H   furosemide 40 mg Oral Daily   glipiZIDE 10 mg Oral QAM AC   insulin aspart 0-14 Units Subcutaneous 4x Daily AC & at Bedtime   lisinopril 40 mg Oral Daily   metFORMIN 500 mg Oral BID With Meals   piperacillin-tazobactam 3.375 g Intravenous Q6H   pregabalin 150 mg Oral TID   Pharmacy Consult  Does not apply Once         Pharmacy to Dose Zosyn        Assessment/Plan     Problem List Items Addressed This Visit     Diabetic ulcer of toe of left foot associated with type 2 diabetes mellitus - Primary    Relevant Medications    metFORMIN (GLUCOPHAGE) 500 MG tablet    glimepiride (AMARYL) 4 MG tablet    Canagliflozin (INVOKANA) 300 MG tablet    Insulin NPH Isophane & Regular (NOVOLIN 70/30 SC)    Insulin Glargine (TOUJEO SOLOSTAR SC)    Other Relevant Orders    Case Request (Completed)    Anaerobic Culture - Wound, Toe, Left    Gram Stain (Completed)    Wound Culture - Wound, Toe, Left (Completed)    Case Request (Completed)    Cellulitis of left lower extremity    Overview     Added automatically from request for surgery [id number]         Relevant Orders    Case Request (Completed)    Anaerobic Culture - Wound, Toe, Left    Gram Stain (Completed)    Wound Culture - Wound, Toe, Left (Completed)    Case Request (Completed)          1.  Diabetic foot infection of the left great toe, Growing out group B strep  2.  Associated cellulitis  3.  Increased LFTs Due to fatty liver disease.  4.  Morbid obesity  5.  Hypertension, essential  6.  Chronic low back pain  7.  History of MRSA  8.  Diabetes type 2 uncontrolled    Plan:    Discontinue vancomycin.  Continue Zosyn.  Patient will be placed on oral Augmentin at discharge.  Dr. Antoine will take the patient to surgery tomorrow then he can be discharged home.  Add Levemir 10 units at  night.  Further recommendations will depend on the clinical course.    Stuart Hair,   01/05/18  3:36 PM

## 2018-01-06 NOTE — ANESTHESIA POSTPROCEDURE EVALUATION
Patient: Wai Medeiros    Procedure Summary     Date Anesthesia Start Anesthesia Stop Room / Location    01/06/18 0859 0928  [facility]       Procedure Diagnosis Surgeon Provider    Left foot irrigation and debridement, wound closure, possible graft application (Left Foot) Cellulitis of left lower extremity; Diabetic ulcer of toe of left foot associated with type 2 diabetes mellitus, unspecified ulcer stage  (Cellulitis of left lower extremity [L03.116]; Diabetic ulcer of toe of left foot associated with type 2 diabetes mellitus, unspecified ulcer stage [E11.621, L97.529]) LAILA Ahn CRNA          Anesthesia Type: MAC  Last vitals  BP   123/71 (01/06/18 0452)   Temp   98.4 °F (36.9 °C) (01/06/18 0452)   Pulse   83 (01/06/18 0452)   Resp   18 (01/06/18 0452)     SpO2   98 % (01/06/18 0452)     Post Anesthesia Care and Evaluation    Patient location during evaluation: PACU  Patient participation: complete - patient participated  Level of consciousness: awake and alert  Pain score: 0  Pain management: satisfactory to patient  Airway patency: patent  Anesthetic complications: No anesthetic complications  PONV Status: none  Cardiovascular status: stable and acceptable  Respiratory status: acceptable  Hydration status: acceptable

## 2018-01-06 NOTE — NURSING NOTE
Patient received via stretcher from PACU. Alert and cooperative. Dressing to left foot clean, dry and intact. Neurovascular checks adequate.

## 2018-01-06 NOTE — BRIEF OP NOTE
FOOT IRRIGATION, DEBRIDEMENT AND REPAIR  Progress Note    Wai Medeiros  1/2/2018 - 1/6/2018    Pre-op Diagnosis:   Cellulitis of left lower extremity [L03.116]  Diabetic ulcer of toe of left foot associated with type 2 diabetes mellitus, unspecified ulcer stage [E11.621, L97.529]       Post-Op Diagnosis Codes:     * Cellulitis of left lower extremity [L03.116]     * Diabetic ulcer of toe of left foot associated with type 2 diabetes mellitus, unspecified ulcer stage [E11.621, L97.529]    Procedure/CPT® Codes:   1.  Left foot repeat irrigation and debridement with delayed wound closure, CPT code 00662-43/59  2.  wound graft application, CPT code 64949    Procedure(s):  Left foot irrigation and debridement, wound closure, possible graft application    Surgeon(s):  Sammy Antoine DPM    Anesthesia: Monitor Anesthesia Care    Staff:   * No surgical staff found *    Estimated Blood Loss: minimal    Urine Voided: * No values recorded between 1/6/2018 12:00 AM and 1/6/2018  9:03 AM *    Specimens:                None  A: Deep tissue swabs were taken and sent to micro-      Drains:    None        Findings: Per dictation    Complications: None      Sammy Antoine DPM     Date: 1/6/2018  Time: 9:03 AM

## 2018-01-06 NOTE — PLAN OF CARE
Problem: Perioperative Period (Adult)  Goal: Signs and Symptoms of Listed Potential Problems Will be Absent or Manageable (Perioperative Period)  Outcome: Ongoing (interventions implemented as appropriate)   01/06/18 0842   Perioperative Period   Problems Assessed (Perioperative Period) all   Problems Present (Perioperative Period) none

## 2018-01-06 NOTE — OP NOTE
PREOPERATIVE DIAGNOSIS:   Left foot abscess, cellulitis, possible osteomyelitis of the great toe, diabetes    POSTOPERATIVE DIAGNOSIS: same    PROCEDURE PERFORMED:  1.  Left foot repeat irrigation and debridement with delayed wound closure, CPT code 00179-89/59  2.  wound graft application, CPT code 05062  3. Wound debridement cpt 05354    SURGEON:  Sammy Antoine DPM    ANESTHESIA:  mac    HEMOSTASIS:  none    EBL:  minimal    SPECIMENS:  Deep tissue swabs to micro    COMPLICATIONS:  none    MATERIALS:  3-0 nylon, 2x3 amniofix graft    INDICATIONS FOR PROCEDURE:  42-year-old diabetic male 3 days status post initial I&D of the left great toe and plantar foot secondary to abscess and probable early osteomyelitis of the great toe.  Being brought back to the operating room today for repeat I&D with hopeful wound closure.  Has been maintained on IV antibiotics per the hospitalist service.  No complications noted.  Has had an uneventful hospital course.  Seems to be responding well to antibiotics and initial I&D.  Plan is for further washout today with partial wound closure and hopes to be discharged later today with close wound care follow-up next week.  The plan has been discussed multiple times with the patient as well as his wife.  All the risks versus benefits and potential, cages of the procedure were discussed.  No guarantees or assurances were given or implied.  Consent was obtained.  All his questions were answered.  He was seen preoperatively and the correct surgical side was marked.  Preoperatively today of the majority of the redness and erythema and edema present to the hallux and medial foot and ankle have resolved.  There is no significant erythema or warmth extending up the leg or ankle.  There is subtle erythema around the first MTPJ area but that is all that remains.  There is still some slight serous drainage within the medial wound.  He is otherwise grossly neurovascularly intact but there is decreased  protective sensation.  Aside from the medial incision along the hallux there is also a full-thickness wound on the plantar portion of the great toe under the IPJ area.  DESCRIPTION OF PROCEDURE:    The patient was brought to the operating room and left on his preoperative stretcher.  Monitored anesthesia was administered.  The left foot was scrubbed, prepped, and draped in the usual aseptic manner.  A timeout was performed identifying the correct surgical patient, procedure, side.  Attention was directed to the left foot wound where it was opened with a blunt dissection and probed.  The medial hallux wound still noted to probe laterally over towards the lateral aspect of the first MTPJ and second MTPJ.  It was also still noted to probe plantarly and plantar medially down the plantar aspect of the first metatarsal.  It still very easily communicated with the secondary incision was made in the plantar foot.  Some serosanguineous drainage was noted but no pus or purulence.  No odor.  Both wounds were opened with blunt dissection and irrigated with copious amounts of bacitracin mixed saline.  3-0 nylon was used to close the plantar arch incision.  Attention was then directed to the plantar aspect of the hallux where a 15 blade was used to perform full-thickness excisional wound debridement of the wound is located more on the plantar aspect of the hallux down into the subcutaneous tissue layer..  I removed the fibrous tissue and slough had developed to help stimulate healthy bleeding wound bed.  Skin edges from the medial incision were also freshened with a 15 blade.  The incision itself measured about 3 cm in length.  The plantar wound measured 2 x 1.  Once the entire area was bleeding and healthy 3-0 nylon was used to close the medial incision in a layered suture fashion with simple interrupted sutures.  Once this was reapproximated a 2 x 3 graft was placed over the incision as well as the plantar wound.  The foot was  then cleaned and dressed with Adaptic, 4 x 4's, Kerlix, Ace wrap.  The patient tolerated the procedure and anesthesia well.  He was transferred from operating room to the recovery room with vital signs stable and neurovascular status intact to the left foot.

## 2018-01-06 NOTE — DISCHARGE SUMMARY
[facility]   DISCHARGE SUMMARY      Name:  Wai Medeiros   Age:  42 y.o.  Sex:  male  :  1975  MRN:  [id number]   Visit Number:  [id number]  Primary Care Physician:  NEHEMIAH Parra  Date of Discharge:  2018  Admission Date:  2018      Discharge Diagnosis:     1.  Diabetic foot infection of the left great toe, Growing out group B strep  2.  Associated cellulitis  3.  Increased LFTs Due to fatty liver disease.  4.  Morbid obesity  5.  Hypertension, essential  6.  Chronic low back pain  7.  History of MRSA  8.  Diabetes type 2 uncontrolled    Active Hospital Problems (** Indicates Principal Problem)    Diagnosis Date Noted   • Diabetic ulcer of toe of left foot associated with type 2 diabetes mellitus [E11.621, L97.529] 2018   • Cellulitis of left lower extremity [L03.116] 2018      Resolved Hospital Problems    Diagnosis Date Noted Date Resolved   No resolved problems to display.         Presenting Problem/History of Present Illness:    Diabetic ulcer of toe of left foot associated with type 2 diabetes mellitus, unspecified ulcer stage [E11.621, L97.529]  Diabetic ulcer of toe of left foot associated with type 2 diabetes mellitus, unspecified ulcer stage [E11.621, L97.529]         Hospital Course:    Patient is a 42-year-old  male with history of diabetes type 2, hypertension, depression, chronic low back pain who one month ago cut a callus off of his left great toe.  He then developed swelling in mid December.  He came to the emergency room and was given amoxicillin.  This did not help, so he came back day prior to admission and was given doxycycline.  He again had worsening swelling and redness ascending up his left calf.  He also had fevers and chills with elevated white count and elevated CRP.  He was admitted.    He was placed on vancomycin and Zosyn, MRI was done which was negative.  Dr. Antoine was consulted.  Patient had I&D of  the toe on 1/3/2018.  This grew out group B strep.  Vancomycin was discontinued.  Patient was taken again for surgery on 1/6/2018 by Dr. Antoine.  He had a left foot repeat irrigation and primary with delayed wound closure as well as a wound graft application.  She did well with the surgery.  He has been switched over to oral Augmentin.  He will continue this for 10 days.    He also had elevated LFTs during the course of the hospital stay.  Liver ultrasound was done which showed fatty liver.  Hepatitis testing was all negative.  He should follow-up with his primary care physician regarding this.    Patient will follow up with Dr. Antoine in wound clinic next Friday with instructions from Dr. Antoine.  A walking boot will be applied to his foot.  Patient denies any chest pressure, shortness breath, nausea, vomiting, or pain at present.  Recommend tight control of his blood sugars.  He'll need close follow-up with his primary care physician.  He also had diabetic teaching from nurse educator while here.  He will need to monitor his blood sugars closely.  He admittedly had been noncompliant before this visit.  Procedures Performed:    Procedure(s):  Left foot irrigation and debridement, wound closure, graft application       Consults:     Consults     Date and Time Order Name Status Description    1/2/2018 1524 Inpatient Consult to Podiatry Completed           Pertinent Test Results:     Lab Results (all)     Procedure Component Value Units Date/Time    CBC Auto Differential [[id number]]  (Abnormal) Collected:  01/02/18 0945    Specimen:  Blood Updated:  01/02/18 1023     WBC 13.54 (H) 10*3/mm3      RBC 5.41 10*6/mm3      Hemoglobin 16.7 g/dL      Hematocrit 48.8 %      MCV 90.2 fL      MCH 30.9 pg      MCHC 34.2 g/dL      RDW 12.2 %      RDW-SD 40.2 fl      MPV 10.0 fL      Platelets 215 10*3/mm3      Neutrophil % 74.8 %      Lymphocyte % 15.6 %      Monocyte % 7.2 %      Eosinophil % 1.6 %      Basophil % 0.5 %      Immature  Grans % 0.3 %      Neutrophils, Absolute 10.13 (H) 10*3/mm3      Lymphocytes, Absolute 2.11 10*3/mm3      Monocytes, Absolute 0.97 (H) 10*3/mm3      Eosinophils, Absolute 0.22 10*3/mm3      Basophils, Absolute 0.07 10*3/mm3      Immature Grans, Absolute 0.04 10*3/mm3      nRBC 0.0 /100 WBC     [place] Draw [[id number]] Collected:  01/02/18 0945    Specimen:  Blood Updated:  01/02/18 1046    Narrative:       The following orders were created for panel order [place] Draw.  Procedure                               Abnormality         Status                     ---------                               -----------         ------                     Light Blue Top[[id number]]                                   Final result               Lavender Top[[id number]]                                     Final result               Gold Top - SST[[id number]]                                   Final result               Green Top (No Gel)[[id number]]                               Final result                 Please view results for these tests on the individual orders.    Light Blue Top [[id number]] Collected:  01/02/18 0945    Specimen:  Blood Updated:  01/02/18 1046     Extra Tube hold for add-on      Auto resulted       Lavender Top [[id number]] Collected:  01/02/18 0945    Specimen:  Blood Updated:  01/02/18 1046     Extra Tube hold for add-on      Auto resulted       Gold Top - SST [[id number]] Collected:  01/02/18 0945    Specimen:  Blood Updated:  01/02/18 1046     Extra Tube Hold for add-ons.      Auto resulted.       Green Top (No Gel) [[id number]] Collected:  01/02/18 0945    Specimen:  Blood Updated:  01/02/18 1046     Extra Tube Hold for add-ons.      Auto resulted.       Comprehensive Metabolic Panel [[id number]]  (Abnormal) Collected:  01/02/18 0945    Specimen:  Blood Updated:  01/02/18 1110     Glucose 390 (C) mg/dL       Glucose >180, Hemoglobin A1C recommended.        BUN 13 mg/dL      Creatinine 0.80 mg/dL      Sodium 133 (L)  mmol/L      Potassium 4.1 mmol/L      Chloride 98 mmol/L      CO2 25.0 (L) mmol/L      Calcium 9.4 mg/dL      Total Protein 7.5 g/dL      Albumin 4.30 g/dL      ALT (SGPT) 170 (H) U/L      AST (SGOT) 71 (H) U/L      Alkaline Phosphatase 116 U/L      Total Bilirubin 0.7 mg/dL      eGFR Non African Amer 107 mL/min/1.73      Globulin 3.2 gm/dL      A/G Ratio 1.3 g/dL      BUN/Creatinine Ratio 16.3     Anion Gap 14.1 mmol/L     C-reactive Protein [[id number]]  (Abnormal) Collected:  01/02/18 0945    Specimen:  Blood Updated:  01/02/18 1217     C-Reactive Protein 21.80 (H) mg/dL     Sedimentation Rate [[id number]]  (Normal) Collected:  01/02/18 0945    Specimen:  Blood Updated:  01/02/18 1250     Sed Rate 14 mm/hr     POC Glucose Once [[id number]]  (Abnormal) Collected:  01/02/18 1345    Specimen:  Blood Updated:  01/02/18 1350     Glucose 151 (H) mg/dL       Serial Number: [id number]:  [id number]       Hemoglobin A1c [[id number]]  (Abnormal) Collected:  01/02/18 0945    Specimen:  Blood Updated:  01/02/18 1824     Hemoglobin A1C 9.7 (H) %     Narrative:       Expected HgbA1C results:     3% to 6% HgbA1C      HgbA1C                 Estimated Average Glucose     5%                              97 mg/dl   6%                             126 mg/dl   7%                             154 mg/dl   8%                             183 mg/dl   9%                             212 mg/dl  >10%                           >240 mg/dl      POC Glucose Once [[id number]]  (Abnormal) Collected:  01/02/18 2053    Specimen:  Blood Updated:  01/02/18 2101     Glucose 323 (H) mg/dL       Serial Number: [id number]:  [id number]       POC Glucose Once [[id number]]  (Abnormal) Collected:  01/03/18 0626    Specimen:  Blood Updated:  01/03/18 0632     Glucose 151 (H) mg/dL       Serial Number: [id number]:  [id number]       CBC & Differential [[id number]] Collected:  01/03/18 0851    Specimen:  Blood Updated:  01/03/18 0909    Narrative:       The  following orders were created for panel order CBC & Differential.  Procedure                               Abnormality         Status                     ---------                               -----------         ------                     CBC Auto Differential[[id number]]        Abnormal            Final result                 Please view results for these tests on the individual orders.    CBC Auto Differential [[id number]]  (Abnormal) Collected:  01/03/18 0851    Specimen:  Blood Updated:  01/03/18 0909     WBC 8.60 10*3/mm3      RBC 5.28 10*6/mm3      Hemoglobin 16.6 g/dL      Hematocrit 48.3 %      MCV 91.5 fL      MCH 31.4 (H) pg      MCHC 34.4 g/dL      RDW 12.3 %      RDW-SD 41.4 fl      MPV 9.5 fL      Platelets 192 10*3/mm3      Neutrophil % 56.1 %      Lymphocyte % 28.3 %      Monocyte % 9.0 %      Eosinophil % 5.6 %      Basophil % 0.5 %      Immature Grans % 0.5 %      Neutrophils, Absolute 4.84 10*3/mm3      Lymphocytes, Absolute 2.43 10*3/mm3      Monocytes, Absolute 0.77 10*3/mm3      Eosinophils, Absolute 0.48 10*3/mm3      Basophils, Absolute 0.04 10*3/mm3      Immature Grans, Absolute 0.04 10*3/mm3      nRBC 0.0 /100 WBC     Comprehensive Metabolic Panel [[id number]]  (Abnormal) Collected:  01/03/18 0851    Specimen:  Blood Updated:  01/03/18 0950     Glucose 161 (H) mg/dL      BUN 14 mg/dL      Creatinine 0.80 mg/dL      Sodium 138 mmol/L      Potassium 4.4 mmol/L      Chloride 103 mmol/L      CO2 29.0 mmol/L      Calcium 8.9 mg/dL      Total Protein 6.8 g/dL      Albumin 3.90 g/dL      ALT (SGPT) 299 (H) U/L      AST (SGOT) 258 (H) U/L      Alkaline Phosphatase 211 (H) U/L      Total Bilirubin 0.6 mg/dL      eGFR Non African Amer 107 mL/min/1.73      Globulin 2.9 gm/dL      A/G Ratio 1.3 g/dL      BUN/Creatinine Ratio 17.5     Anion Gap 10.4 mmol/L     Narrative:       Abnormal estimated GFR should be followed by more specific studies to confirm end stage chronic renal disease. The equation used  for calculation may not be accurate for patients less than 19 years old, greater than 70 years old, patients at extremes of weight, malnutrition, or with acute renal dysfunction.    POC Glucose Once [[id number]]  (Abnormal) Collected:  01/03/18 1115    Specimen:  Blood Updated:  01/03/18 1127     Glucose 357 (H) mg/dL       Serial Number: [id number]:  [id number]       POC Glucose Once [[id number]]  (Normal) Collected:  01/03/18 1421    Specimen:  Blood Updated:  01/03/18 1425     Glucose 114 mg/dL       Serial Number: [id number]:  [id number]       Hepatitis B & C Profile [[id number]] Collected:  01/02/18 0945    Specimen:  Blood Updated:  01/03/18 1512     Hepatitis B Surface Ag Negative     Hep B E Ag Negative     Hep B Core IgM Negative     Hep B Core Total Ab Negative     Hep B E Ab Negative     Hep B S Ab Non Reactive                    Non Reactive: Inconsistent with immunity,                              less than 10 mIU/mL                Reactive:     Consistent with immunity,                              greater than 9.9 mIU/mL        Hepatitis C Ab <0.1 s/co ratio     Narrative:       Performed at:  [address] [place] [address]  [id number]  : Aden Ledezma PhD, Phone:  [phone]    Comment: [[id number]] Collected:  01/02/18 0945    Specimen:  Blood Updated:  01/03/18 1512     Comment Comment      Non reactive HCV antibody screen is consistent with no HCV infection,  unless recent infection is suspected or other evidence exists to  indicate HCV infection.       Narrative:       Performed at:  [address] [place] [address]  [id number]  : Aden Ledezma PhD, Phone:  [phone]    Anaerobic Culture - Wound, Toe, Left [[id number]] Collected:  01/03/18 1508    Specimen:  Wound from Toe, Left Updated:  01/03/18 1602    Gram Stain [[id number]] Collected:  01/03/18 1508    Specimen:  Wound from Toe, Left Updated:  01/03/18 1616     Gram Stain  Result Rare (1+) WBCs seen      No organisms seen    POC Glucose Once [[id number]]  (Abnormal) Collected:  01/03/18 1706    Specimen:  Blood Updated:  01/03/18 1710     Glucose 165 (H) mg/dL       Serial Number: [id number]:  [id number]       POC Glucose Once [[id number]]  (Abnormal) Collected:  01/03/18 2036    Specimen:  Blood Updated:  01/03/18 2056     Glucose 236 (H) mg/dL       Serial Number: [id number]:  [id number]       POC Glucose Once [[id number]]  (Abnormal) Collected:  01/04/18 0614    Specimen:  Blood Updated:  01/04/18 0622     Glucose 199 (H) mg/dL       Serial Number: [id number]:  [id number]       CBC & Differential [[id number]] Collected:  01/04/18 0620    Specimen:  Blood Updated:  01/04/18 0722    Narrative:       The following orders were created for panel order CBC & Differential.  Procedure                               Abnormality         Status                     ---------                               -----------         ------                     CBC Auto Differential[[id number]]        Abnormal            Final result                 Please view results for these tests on the individual orders.    CBC Auto Differential [[id number]]  (Abnormal) Collected:  01/04/18 0620    Specimen:  Blood Updated:  01/04/18 0722     WBC 15.19 (H) 10*3/mm3      RBC 5.41 10*6/mm3      Hemoglobin 17.1 g/dL      Hematocrit 49.5 %      MCV 91.5 fL      MCH 31.6 (H) pg      MCHC 34.5 g/dL      RDW 12.4 %      RDW-SD 41.8 fl      MPV 9.9 fL      Platelets 267 10*3/mm3      Neutrophil % 67.3 %      Lymphocyte % 21.3 %      Monocyte % 6.7 %      Eosinophil % 3.5 %      Basophil % 0.5 %      Immature Grans % 0.7 (H) %      Neutrophils, Absolute 10.23 (H) 10*3/mm3      Lymphocytes, Absolute 3.23 10*3/mm3      Monocytes, Absolute 1.02 (H) 10*3/mm3      Eosinophils, Absolute 0.53 10*3/mm3      Basophils, Absolute 0.08 10*3/mm3      Immature Grans, Absolute 0.10 (H) 10*3/mm3      nRBC 0.0 /100 WBC      Comprehensive Metabolic Panel [[id number]]  (Abnormal) Collected:  01/04/18 0620    Specimen:  Blood Updated:  01/04/18 0739     Glucose 218 (H) mg/dL       Glucose >180, Hemoglobin A1C recommended.        BUN 16 mg/dL      Creatinine 0.80 mg/dL      Sodium 138 mmol/L      Potassium 4.5 mmol/L      Chloride 101 mmol/L      CO2 27.0 mmol/L      Calcium 9.4 mg/dL      Total Protein 7.3 g/dL      Albumin 4.20 g/dL      ALT (SGPT) 375 (C) U/L      AST (SGOT) 158 (H) U/L      Alkaline Phosphatase 327 (H) U/L      Total Bilirubin 0.5 mg/dL      eGFR Non African Amer 107 mL/min/1.73      Globulin 3.1 gm/dL      A/G Ratio 1.4 g/dL      BUN/Creatinine Ratio 20.0     Anion Gap 14.5 mmol/L     Narrative:       Abnormal estimated GFR should be followed by more specific studies to confirm end stage chronic renal disease. The equation used for calculation may not be accurate for patients less than 19 years old, greater than 70 years old, patients at extremes of weight, malnutrition, or with acute renal dysfunction.    POC Glucose Once [[id number]]  (Abnormal) Collected:  01/04/18 1136    Specimen:  Blood Updated:  01/04/18 1158     Glucose 271 (H) mg/dL       Serial Number: [id number]:  [id number]       Vancomycin, Trough [[id number]]  (Normal) Collected:  01/04/18 1121    Specimen:  Blood Updated:  01/04/18 1225     Vancomycin Trough 11.32 mcg/mL     POC Glucose Once [[id number]]  (Abnormal) Collected:  01/04/18 1603    Specimen:  Blood Updated:  01/04/18 1646     Glucose 241 (H) mg/dL       Serial Number: [id number]:  [id number]       POC Glucose Once [[id number]]  (Abnormal) Collected:  01/04/18 1956    Specimen:  Blood Updated:  01/04/18 2030     Glucose 334 (H) mg/dL       Serial Number: [id number]:  [id number]       CBC & Differential [[id number]] Collected:  01/05/18 0522    Specimen:  Blood Updated:  01/05/18 0549    Narrative:       The following orders were created for panel order CBC & Differential.  Procedure                                Abnormality         Status                     ---------                               -----------         ------                     CBC Auto Differential[[id number]]        Abnormal            Final result                 Please view results for these tests on the individual orders.    CBC Auto Differential [[id number]]  (Abnormal) Collected:  01/05/18 0522    Specimen:  Blood Updated:  01/05/18 0549     WBC 11.48 (H) 10*3/mm3      RBC 5.09 10*6/mm3      Hemoglobin 15.8 g/dL      Hematocrit 46.7 %      MCV 91.7 fL      MCH 31.0 pg      MCHC 33.8 g/dL      RDW 12.2 %      RDW-SD 41.1 fl      MPV 9.7 fL      Platelets 219 10*3/mm3      Neutrophil % 56.7 %      Lymphocyte % 28.7 %      Monocyte % 9.6 %      Eosinophil % 3.9 %      Basophil % 0.4 %      Immature Grans % 0.7 (H) %      Neutrophils, Absolute 6.51 10*3/mm3      Lymphocytes, Absolute 3.29 10*3/mm3      Monocytes, Absolute 1.10 (H) 10*3/mm3      Eosinophils, Absolute 0.45 10*3/mm3      Basophils, Absolute 0.05 10*3/mm3      Immature Grans, Absolute 0.08 (H) 10*3/mm3      nRBC 0.0 /100 WBC     Renal Function Panel [[id number]]  (Abnormal) Collected:  01/05/18 0522    Specimen:  Blood Updated:  01/05/18 0615     Glucose 189 (H) mg/dL      BUN 12 mg/dL      Creatinine 0.80 mg/dL      Sodium 140 mmol/L      Potassium 4.0 mmol/L      Chloride 102 mmol/L      CO2 28.0 mmol/L      Calcium 9.0 mg/dL      Albumin 3.80 g/dL      Phosphorus 4.2 mg/dL      Anion Gap 14.0 mmol/L      BUN/Creatinine Ratio 15.0     eGFR Non African Amer 107 mL/min/1.73     Narrative:       Abnormal estimated GFR should be followed by more specific studies to confirm end stage chronic renal disease. The equation used for calculation may not be accurate for patients less than 19 years old, greater than 70 years old, patients at extremes of weight, malnutrition, or with acute renal dysfunction.    POC Glucose Once [[id number]]  (Abnormal) Collected:  01/05/18 4903     Specimen:  Blood Updated:  01/05/18 0615     Glucose 171 (H) mg/dL       Serial Number: [id number]:  [id number]       POC Glucose Once [[id number]]  (Abnormal) Collected:  01/05/18 0607    Specimen:  Blood Updated:  01/05/18 0615     Glucose 168 (H) mg/dL       Serial Number: [id number]:  [id number]       Acinetobacter Screen - Swab, Nares [[id number]]  (Normal) Collected:  01/02/18 1315    Specimen:  Swab from Axilla, Left Updated:  01/05/18 0901     ACINETOBACTER SCREEN CX No Acinetobacter isolated    VRE Culture - Swab, Per Rectum [[id number]]  (Normal) Collected:  01/02/18 1315    Specimen:  Swab from Per Rectum Updated:  01/05/18 0902     VRE SCREEN CX No Vancomycin Resistant Enterococcus Isolated    Wound Culture - Wound, Toe, Left [[id number]]  (Normal) Collected:  01/03/18 1508    Specimen:  Wound from Toe, Left Updated:  01/05/18 0911     Wound Culture No growth    MRSA Screen Culture - Swab, Nares [[id number]]  (Normal) Collected:  01/02/18 1315    Specimen:  Swab from Nares Updated:  01/05/18 0945     MRSA SCREEN CX No Methicillin Resistant Staphylococcus aureus isolated    Wound Culture - Wound, Toe, Left [[id number]]  (Abnormal)  (Susceptibility) Collected:  01/02/18 1316    Specimen:  Wound from Toe, Left Updated:  01/05/18 1107     Wound Culture --      Light growth (2+) Streptococcus agalactiae (Group B) (A)      Sensitivity to follow.     This isolate does not demonstrate inducible clindamycin resistance in vitro.          STREP GROUPING B    Susceptibility      Streptococcus agalactiae (Group B)     CHARLI     Amoxicillin + Clavulanate <=4/2 ug/ml     Ampicillin + Sulbactam <=8/4 ug/ml     Cefazolin <=4 ug/ml     Ciprofloxacin <=1 ug/ml     Daptomycin <=0.5 ug/ml Susceptible     Levofloxacin <=1 ug/ml Susceptible     Linezolid <=1 ug/ml Susceptible     Moxifloxacin <=0.5 ug/ml     Oxacillin <=0.25 ug/ml     Penicillin G 0.12 ug/ml Susceptible     Quinupristin + Dalfopristin <=1 ug/ml     Rifampin  <=1 ug/ml     Trimethoprim + Sulfamethoxazole <=0.5/9.5 ug/ml     Vancomycin 0.5 ug/ml Susceptible                    POC Glucose Once [[id number]]  (Abnormal) Collected:  01/05/18 1057    Specimen:  Blood Updated:  01/05/18 1110     Glucose 321 (H) mg/dL       Serial Number: [id number]:  [id number]       POC Glucose Once [[id number]]  (Abnormal) Collected:  01/05/18 1634    Specimen:  Blood Updated:  01/05/18 1645     Glucose 256 (H) mg/dL       Serial Number: [id number]:  [id number]       POC Glucose Once [[id number]]  (Abnormal) Collected:  01/05/18 2039    Specimen:  Blood Updated:  01/05/18 2055     Glucose 200 (H) mg/dL       Serial Number: [id number]:  [id number]       POC Glucose Once [[id number]]  (Abnormal) Collected:  01/06/18 0617    Specimen:  Blood Updated:  01/06/18 0626     Glucose 177 (H) mg/dL       Serial Number: [id number]:  [id number]       Anaerobic Culture - Wound, Foot, Left [[id number]] Collected:  01/06/18 0920    Specimen:  Wound from Foot, Left Updated:  01/06/18 1058    Wound Culture - Wound, Foot, Left [[id number]] Collected:  01/06/18 0920    Specimen:  Wound from Foot, Left Updated:  01/06/18 1058    Gram Stain [[id number]] Collected:  01/06/18 0920    Specimen:  Wound from Foot, Left Updated:  01/06/18 1117     Gram Stain Result Moderate (3+) WBCs seen      No organisms seen    POC Glucose Once [[id number]]  (Abnormal) Collected:  01/06/18 1138    Specimen:  Blood Updated:  01/06/18 1146     Glucose 138 (H) mg/dL       Serial Number: [id number]:  [id number]             Imaging Results (all)     Procedure Component Value Units Date/Time    MRI Foot Left Without Contrast [[id number]] Collected:  01/02/18 1618     Updated:  01/02/18 1622    Narrative:       PROCEDURE: MRI FOOT LEFT WO CONTRAST-     HISTORY: r/o osteomyelitis; E11.621-Type 2 diabetes mellitus with foot  ulcer; L97.529-Non-pressure chronic ulcer of other part of left foot  with unspecified severity;  L03.116-Cellulitis of left lower limb     TECHNIQUE: Multiplanar multisequence imaging of the foot was performed  without the use of intravenous contrast.     FINDINGS: The flexor and extensor tendons are intact. The Lisfranc  ligament is intact. The plantar fascia is normal. The articular  cartilage is preserved. Bone marrow signal is homogeneous. There is soft  tissue edema within the foot most pronounced medially and dorsally.       Impression:       Diffuse soft tissue swelling with no evidence of an abscess  or osteomyelitis.     This report was finalized on 1/2/2018 4:20 PM by Codey Anderson M.D..    US Venous Doppler Lower Extremity Left (duplex) [[id number]] Collected:  01/03/18 0934     Updated:  01/03/18 0937    Narrative:       PROCEDURE: US VENOUS DOPPLER LOWER EXTREMITY LEFT (DUPLEX)-     HISTORY: leg swelling; E11.621-Type 2 diabetes mellitus with foot ulcer;  L97.529-Non-pressure chronic ulcer of other part of left foot with  unspecified severity; L03.116-Cellulitis of left lower limb     PROCEDURE: Multiple transverse and longitudinal scans were performed of  the femoropopliteal deep venous system, with augmentation and  compression maneuvers.     FINDINGS: Normal phasic flow was noted in the visualized deep venous  system. No intraluminal increased echogenicity is noted to suggest  thrombus. There is normal compression and augmentation of the venous  structures.  No abnormal venous collaterals are seen.       Impression:       No evidence of deep venous thrombosis.     This report was finalized on 1/3/2018 9:35 AM by Codey Anderson M.D..    US Liver [[id number]] Collected:  01/03/18 0935     Updated:  01/03/18 0937    Narrative:       PROCEDURE: US LIVER-     HISTORY: elevated lfts; E11.621-Type 2 diabetes mellitus with foot  ulcer; L97.529-Non-pressure chronic ulcer of other part of left foot  with unspecified severity; L03.116-Cellulitis of left lower limb     TECHNIQUE: Sonographic images  "of the liver were obtained in the  transverse and sagittal planes with color flow and pulse Doppler.     FINDINGS: The liver is echogenic consistent with fatty infiltration.  There is no evidence of a focal liver mass. The hepatic vasculature is  patent with normal directional flow. The portal vein measures  16.4 mm .  The patient is status post cholecystectomy. The common duct measures 5.1  mm. Limited images of the right kidney are unremarkable.       Impression:       Fatty infiltration of the liver.        This report was finalized on 1/3/2018 9:35 AM by Codey Anderson M.D..          Condition on Discharge:      Stable    Vital Signs:    /65 (BP Location: Left arm, Patient Position: Lying)  Pulse 85  Temp 97.8 °F (36.6 °C) (Oral)   Resp 15  Ht 182.9 cm (72\")  Wt 119 kg (263 lb)  SpO2 96%  BMI 35.67 kg/m2    Physical Exam:      General Appearance:    Alert, cooperative, in no acute distress   Head:    Normocephalic, without obvious abnormality, atraumatic   Eyes:            Lids and lashes normal, conjunctivae and sclerae normal, no   icterus, no pallor, corneas clear, PERRLA   Ears:    Ears appear intact with no abnormalities noted   Throat:   No oral lesions, no thrush, oral mucosa moist   Neck:   No adenopathy, supple, trachea midline, no thyromegaly, no   carotid bruit, no JVD   Back:     No kyphosis present, no scoliosis present, no skin lesions,      erythema or scars, no tenderness to percussion or                   palpation,   range of motion normal   Lungs:     Clear to auscultation,respirations regular, even and                  unlabored    Heart:    Regular rhythm and normal rate, normal S1 and S2, no            murmur, no gallop, no rub, no click   Chest Wall:    No abnormalities observed   Abdomen:     Normal bowel sounds, no masses, no organomegaly, soft        non-tender, non-distended, no guarding, no rebound                tenderness   Rectal:     Deferred   Extremities:   " Moves all extremities well, no edema, no cyanosis, no             redness   Pulses:   Pulses palpable and equal bilaterally   Skin:   No bleeding, bruising or rash   Lymph nodes:   No palpable adenopathy   Neurologic:   Cranial nerves 2 - 12 grossly intact, sensation intact, DTR       present and equal bilaterally       Discharge Disposition:    Home or Self Care    Discharge Medication:     Wai Medeiros   Home Medication Instructions SOPHIA:[id number]    Printed on:01/06/18 1224   Medication Information                      acetaminophen (TYLENOL 8 HOUR) 650 MG 8 hr tablet  Take 1 tablet by mouth Every 8 (Eight) Hours As Needed for Mild Pain .             amoxicillin-clavulanate (AUGMENTIN) 875-125 MG per tablet  Take 1 tablet by mouth Every 12 (Twelve) Hours.             buPROPion SR (WELLBUTRIN SR) 150 MG 12 hr tablet  Take 150 mg by mouth Daily.             Canagliflozin (INVOKANA) 300 MG tablet  Take 300 mg by mouth Daily.             DULoxetine (CYMBALTA) 60 MG capsule  Take 60 mg by mouth Daily.             furosemide (LASIX) 40 MG tablet  Take 40 mg by mouth Daily.             glimepiride (AMARYL) 4 MG tablet  Take 8 mg by mouth Every Morning Before Breakfast.             ibuprofen (ADVIL,MOTRIN) 600 MG tablet  Take 1 tablet by mouth Every 8 (Eight) Hours As Needed for Mild Pain .             Insulin Glargine (TOUJEO SOLOSTAR SC)  Inject 10 Units under the skin Daily.             lisinopril (PRINIVIL,ZESTRIL) 40 MG tablet  Take 40 mg by mouth Daily.             metFORMIN (GLUCOPHAGE) 500 MG tablet  Take 1,000 mg by mouth 2 (Two) Times a Day With Meals.             ondansetron ODT (ZOFRAN-ODT) 4 MG disintegrating tablet  Take 1 tablet by mouth Every 8 (Eight) Hours As Needed for Nausea or Vomiting.             oxyCODONE-acetaminophen (PERCOCET)  MG per tablet  Take 1 tablet by mouth Every 6 (Six) Hours As Needed for Moderate Pain .             pregabalin (LYRICA) 100 MG capsule  Take 150 mg  by mouth 3 (Three) Times a Day.                 Discharge Diet:   Diabetic diet  Diet Instructions     As tolerated.               Activity at Discharge:     Activity Instructions     As tolerated. Wear boot/shoe when up and ambulating.               Follow-up Appointments:  Follow-up PCP, and Dr. Antoine in 1 week  No future appointments.  Additional Instructions for the Follow-ups that You Need to Schedule     See Dr. Antoine at wound care next Friday.               Test Results Pending at Discharge:     Order Current Status    Anaerobic Culture - Wound, Foot, Left In process    Anaerobic Culture - Wound, Toe, Left In process    Wound Culture - Wound, Foot, Left In process    Wound Culture - Wound, Toe, Left Preliminary result             Stuart Hair DO  01/06/18  12:24 PM

## 2018-01-07 LAB
BACTERIA SPEC AEROBE CULT: NO GROWTH
BACTERIA SPEC ANAEROBE CULT: NO GROWTH

## 2018-01-10 LAB
BACTERIA SPEC AEROBE CULT: NO GROWTH
BACTERIA SPEC ANAEROBE CULT: NO GROWTH

## 2018-01-12 ENCOUNTER — OFFICE VISIT (OUTPATIENT)
Dept: ORTHOPEDIC SURGERY | Facility: CLINIC | Age: 43
End: 2018-01-12

## 2018-01-12 VITALS — HEIGHT: 72 IN | RESPIRATION RATE: 18 BRPM | WEIGHT: 263 LBS | BODY MASS INDEX: 35.62 KG/M2

## 2018-01-12 DIAGNOSIS — L97.521 ULCER OF FOOT, LEFT, LIMITED TO BREAKDOWN OF SKIN (HCC): ICD-10-CM

## 2018-01-12 DIAGNOSIS — E11.622 TYPE 2 DIABETES MELLITUS WITH OTHER SKIN ULCER, WITH LONG-TERM CURRENT USE OF INSULIN (HCC): Primary | ICD-10-CM

## 2018-01-12 DIAGNOSIS — Z79.4 TYPE 2 DIABETES MELLITUS WITH OTHER SKIN ULCER, WITH LONG-TERM CURRENT USE OF INSULIN (HCC): Primary | ICD-10-CM

## 2018-01-12 DIAGNOSIS — E11.42 DIABETIC POLYNEUROPATHY ASSOCIATED WITH TYPE 2 DIABETES MELLITUS (HCC): ICD-10-CM

## 2018-01-12 DIAGNOSIS — L84 CALLUS OF FOOT: ICD-10-CM

## 2018-01-12 DIAGNOSIS — M86.9 OSTEOMYELITIS OF ANKLE OR FOOT: ICD-10-CM

## 2018-01-12 PROCEDURE — 99213 OFFICE O/P EST LOW 20 MIN: CPT | Performed by: PODIATRIST

## 2018-01-12 PROCEDURE — 99024 POSTOP FOLLOW-UP VISIT: CPT | Performed by: PODIATRIST

## 2018-01-12 RX ORDER — INSULIN GLARGINE 300 U/ML
INJECTION, SOLUTION SUBCUTANEOUS
Refills: 2 | Status: ON HOLD | COMMUNITY
Start: 2017-12-01 | End: 2018-06-04

## 2018-01-12 RX ORDER — BUPROPION HYDROCHLORIDE 150 MG/1
150 TABLET ORAL EVERY MORNING
Status: ON HOLD | COMMUNITY
Start: 2017-12-29 | End: 2018-06-04

## 2018-01-12 RX ORDER — INSULIN ASPART 100 [IU]/ML
INJECTION, SOLUTION INTRAVENOUS; SUBCUTANEOUS
Status: ON HOLD | COMMUNITY
Start: 2017-12-01 | End: 2018-06-04

## 2018-01-12 RX ORDER — OXYCODONE HYDROCHLORIDE AND ACETAMINOPHEN 5; 325 MG/1; MG/1
TABLET ORAL
Refills: 0 | Status: ON HOLD | COMMUNITY
Start: 2017-12-04 | End: 2018-06-04

## 2018-01-12 NOTE — PROGRESS NOTES
Subjective   Patient ID: Wai Medeiros is a 42 y.o. male STATUS POST:  Post-op of the Left Foot (Irrigation and debridement, wound closure and graft micah 1/6/18)  He returns today for his first follow-up visit after hospital admission and surgery.  States he is doing okay with his antibiotics and denies problems.  Seems that he has been compliant with weightbearing in the Darco shoe.  His dressing is intact and has not been touched.  Denies any complaints or constitutional symptoms.  States he would like for me to look at his right foot as well.    History of Present Illness    Review of Systems   Constitutional: Negative for diaphoresis, fever and unexpected weight change.   HENT: Negative for dental problem and sore throat.    Eyes: Negative for visual disturbance.   Respiratory: Negative for shortness of breath.    Cardiovascular: Negative for chest pain.   Gastrointestinal: Negative for abdominal pain, constipation, diarrhea, nausea and vomiting.   Genitourinary: Negative for difficulty urinating and frequency.   Neurological: Negative for headaches.   Hematological: Does not bruise/bleed easily.   All other systems reviewed and are negative.      Past Medical History:   Diagnosis Date   • Depression    • Diabetes mellitus    • Hypertension    • Injury of back    • Neuropathy         Past Surgical History:   Procedure Laterality Date   • APPENDECTOMY     • BACK SURGERY     • CHOLECYSTECTOMY     • FOOT IRRIGATION, DEBRIDEMENT AND REPAIR Left 1/6/2018    Procedure: Left foot irrigation and debridement, wound closure, graft application;  Surgeon: Sammy Antoine DPM;  Location: [facility];  Service:    • INCISION AND DRAINAGE FOOT Left 1/3/2018    Procedure: INCISION AND DRAINAGE FOOT ABSCESS, GREAT TOE;  Surgeon: aSmmy Antoine DPM;  Location: [facility];  Service:    • SCROTAL SURGERY         Social History     Social History   • Marital status:      Spouse name: N/A   • Number of children: N/A    • Years of education: N/A     Occupational History   • Not on file.     Social History Main Topics   • Smoking status: Current Every Day Smoker     Packs/day: 1.00     Types: Cigarettes   • Smokeless tobacco: Never Used   • Alcohol use No   • Drug use: No   • Sexual activity: Defer     Other Topics Concern   • Not on file     Social History Narrative       Ready to quit: Not Answered  Counseling given: Not Answered      I have reviewed all of the above social hx, family hx, surgical hx, medications, allergies & ROS and confirm that it is accurate.    No Known Allergies    Objective   Physical Exam   Constitutional: He is oriented to person, place, and time. He appears well-developed and well-nourished.   HENT:   Head: Normocephalic and atraumatic.   Nose: Nose normal.   Eyes: Conjunctivae and EOM are normal. Pupils are equal, round, and reactive to light.   Neck: Normal range of motion.   Cardiovascular: Intact distal pulses.    Pulmonary/Chest: Effort normal.   Abdominal: Soft.   Neurological: He is alert and oriented to person, place, and time.   Skin: Skin is warm.   Psychiatric: He has a normal mood and affect. His behavior is normal. Judgment and thought content normal.   Nursing note reviewed.    Ortho Exam  Ortho Exam  Left foot exam shows that he is grossly neurovascularly intact but does have diminished protective sensation.  Pedal hair is noted.  No changes in orthopedic exam.  Stitches are intact to the distal medial arch as well as the hallux.  There are remnants of graft still intact and holding well.  His incision area seems to be well coapted.  The plantar aspect of the great toe wound is still evident but this is barely full-thickness.  Has good epithelium covering this.  Measures roughly one by one.  Shows no clinical infection signs.  He slightly tender to palpation along the plantar first metatarsal.  There is minimal edema or erythema here.  No drainage or odor.    Right foot exam shows pedal  hair is noted, CFT is brisk, pulses are palpable, protective sensations diminished but gross sensation is intact.  He has decreased range of motion of the first MTPJ with the foot loaded and unloaded.  He has a very thick large hyperkeratotic callus/lesion under the plantar medial first MTPJ.  This has a small amount of blood underlying and deep within.  This is also on the verge of becoming a preoperative lesion similar to how the left foot started.        Assessment/Plan  status post I&D of the left foot ×2 with delayed wound closure and grafting, diabetes   Bilateral hallux limitus   Right hallux hyperkeratotic lesion/callus/pre-ulcerative lesion   Left hallux wound   Independent Review of Radiographic Studies:      Laboratory and Other Studies:     Medical Decision Making:        Procedures  [] No procedures were performed in office today.    Wai was seen today for post-op.    Diagnoses and all orders for this visit:    Type 2 diabetes mellitus with other skin ulcer, with long-term current use of insulin    Ulcer of foot, left, limited to breakdown of skin    Callus of foot    Diabetic polyneuropathy associated with type 2 diabetes mellitus    Osteomyelitis of ankle or foot        Recommendations/Plan:  for the left foot I applied a small amount of Adriane and wound gel.  I would like for him to keep this wound area covered and clean and dry.  I think it may actually heal well with not much in the way of further wound care but we'll we will reevaluate in 2 weeks.  I recommend he continue weightbearing in his Darco shoe.  Continue his antibiotics.  If he needs help with dressing changes or supplies he should call and let me know.  I instructed him on dressing changes if he needs to perform them.    For the right foot I discussed the callused area with him and explained that it has the potential to turn into the same problem as the left foot.  I will prescribe him urea and discuss with him alternatives if urea  was not covered.  This should be applied twice a day.  I explained to him that once we get his left foot wound healed we will then send him for custom molded orthotics with diabetic inserts and diabetic shoes and hopefully this will help prevent further problems.  He should follow up in 2 weeks.  Call with any concerns.      Return in about 2 weeks (around 1/26/2018).  Patient agreeable to call or return sooner for any concerns.

## 2018-01-26 ENCOUNTER — OFFICE VISIT (OUTPATIENT)
Dept: ORTHOPEDIC SURGERY | Facility: CLINIC | Age: 43
End: 2018-01-26

## 2018-01-26 VITALS — WEIGHT: 263 LBS | BODY MASS INDEX: 35.62 KG/M2 | HEIGHT: 72 IN | RESPIRATION RATE: 18 BRPM

## 2018-01-26 DIAGNOSIS — E11.622 TYPE 2 DIABETES MELLITUS WITH OTHER SKIN ULCER, WITH LONG-TERM CURRENT USE OF INSULIN (HCC): ICD-10-CM

## 2018-01-26 DIAGNOSIS — E11.42 DIABETIC POLYNEUROPATHY ASSOCIATED WITH TYPE 2 DIABETES MELLITUS (HCC): ICD-10-CM

## 2018-01-26 DIAGNOSIS — L97.521 ULCER OF FOOT, LEFT, LIMITED TO BREAKDOWN OF SKIN (HCC): Primary | ICD-10-CM

## 2018-01-26 DIAGNOSIS — Z79.4 TYPE 2 DIABETES MELLITUS WITH OTHER SKIN ULCER, WITH LONG-TERM CURRENT USE OF INSULIN (HCC): ICD-10-CM

## 2018-01-26 DIAGNOSIS — L84 CALLUS OF FOOT: ICD-10-CM

## 2018-01-26 PROCEDURE — 99024 POSTOP FOLLOW-UP VISIT: CPT | Performed by: PODIATRIST

## 2018-01-26 RX ORDER — TRAMADOL HYDROCHLORIDE 50 MG/1
TABLET ORAL
Refills: 0 | Status: ON HOLD | COMMUNITY
Start: 2017-12-29 | End: 2018-06-04

## 2018-01-26 RX ORDER — RANITIDINE 150 MG/1
TABLET ORAL
Status: ON HOLD | COMMUNITY
Start: 2017-11-02 | End: 2018-06-03

## 2018-01-26 NOTE — PROGRESS NOTES
Subjective   Patient ID: Wai Medeiros is a 42 y.o. male with diabetes returns for follow-up after I&D of his left great toe wound performed 20 days ago.  Says that he broke the Velcro on his postoperative shoe so he's been wearing a regular shoe the last couple of days.  He says every day when he changes his dressing there is some type of different colored drainage but nothing that really sounds or seems to be infectious.  He denies constitutional symptoms.      History of Present Illness                                                   Review of Systems   Constitutional: Negative for diaphoresis, fever and unexpected weight change.   HENT: Negative for dental problem and sore throat.    Eyes: Negative for visual disturbance.   Respiratory: Negative for shortness of breath.    Cardiovascular: Negative for chest pain.   Gastrointestinal: Negative for abdominal pain, constipation, diarrhea, nausea and vomiting.   Genitourinary: Negative for difficulty urinating and frequency.   Neurological: Negative for headaches.   Hematological: Does not bruise/bleed easily.   All other systems reviewed and are negative.      Past Medical History:   Diagnosis Date   • Depression    • Diabetes mellitus    • Hypertension    • Injury of back    • Neuropathy         Past Surgical History:   Procedure Laterality Date   • APPENDECTOMY     • BACK SURGERY     • CHOLECYSTECTOMY     • FOOT IRRIGATION, DEBRIDEMENT AND REPAIR Left 1/6/2018    Procedure: Left foot irrigation and debridement, wound closure, graft application;  Surgeon: Sammy Antoine DPM;  Location: [facility] OR;  Service:    • INCISION AND DRAINAGE FOOT Left 1/3/2018    Procedure: INCISION AND DRAINAGE FOOT ABSCESS, GREAT TOE;  Surgeon: Sammy Antoine DPM;  Location: [facility];  Service:    • SCROTAL SURGERY         Social History     Social History   • Marital status:      Spouse name: N/A   • Number of children: N/A   • Years of education: N/A      Occupational History   • Not on file.     Social History Main Topics   • Smoking status: Current Every Day Smoker     Packs/day: 1.00     Types: Cigarettes   • Smokeless tobacco: Never Used   • Alcohol use No   • Drug use: No   • Sexual activity: Defer     Other Topics Concern   • Not on file     Social History Narrative       Ready to quit: Not Answered  Counseling given: Not Answered      I have reviewed all of the above social hx, family hx, surgical hx, medications, allergies & ROS and confirm that it is accurate.    No Known Allergies    Objective   Physical Exam  Ortho Exam  Ortho Exam  His plantar medial left hallux is very dry and callused and crusted.  There seems to be callus formed completely over the wound.  I currently today do not appreciate any open wound that is full-thickness.  There some small areas of fissuring and cracks but these seem to be very superficial today.  He complains of slight pain with range of motion of the interphalangeal joint.  He also told me that he gets some sharp shooting pains in the foot at times.  I explained that typically related to his neuropathy.  Overall his left great toe appears to be healing well with no sign of residual infection.      Assessment/Plan  diabetes, left hallux wound  Independent Review of Radiographic Studies:      Laboratory and Other Studies:     Medical Decision Making:        Procedures  Debridement Note    Wai was seen today for wound check and follow-up.    Diagnoses and all orders for this visit:    Ulcer of foot, left, limited to breakdown of skin    Type 2 diabetes mellitus with other skin ulcer, with long-term current use of insulin    Callus of foot    Diabetic polyneuropathy associated with type 2 diabetes mellitus        Recommendations/Plan:  I gave him a list of over-the-counter emollients and moisturizing agents to apply daily.  I still recommend he keep it covered and dry.  I discussed shoes with him and I explained to try  wearing a regular shoe but he needs to watch this very closely and if it seems as if its worsening hasty let me know and we will get him Main other diabetic Darco shoe.  Dasia is not having any problems he can follow-up in 3 weeks.    Return in about 3 weeks (around 2/16/2018).  Patient agreeable to call or return sooner for any concerns.

## 2018-02-06 ENCOUNTER — APPOINTMENT (OUTPATIENT)
Dept: DIABETES SERVICES | Facility: HOSPITAL | Age: 43
End: 2018-02-06

## 2018-02-14 ENCOUNTER — OFFICE VISIT (OUTPATIENT)
Dept: ORTHOPEDIC SURGERY | Facility: CLINIC | Age: 43
End: 2018-02-14

## 2018-02-14 VITALS — WEIGHT: 263 LBS | BODY MASS INDEX: 35.62 KG/M2 | RESPIRATION RATE: 18 BRPM | HEIGHT: 72 IN

## 2018-02-14 DIAGNOSIS — L97.521 ULCER OF FOOT, LEFT, LIMITED TO BREAKDOWN OF SKIN (HCC): Primary | ICD-10-CM

## 2018-02-14 DIAGNOSIS — Z79.4 TYPE 2 DIABETES MELLITUS WITH OTHER SKIN ULCER, WITH LONG-TERM CURRENT USE OF INSULIN (HCC): ICD-10-CM

## 2018-02-14 DIAGNOSIS — E11.622 TYPE 2 DIABETES MELLITUS WITH OTHER SKIN ULCER, WITH LONG-TERM CURRENT USE OF INSULIN (HCC): ICD-10-CM

## 2018-02-14 DIAGNOSIS — L84 CALLUS OF FOOT: ICD-10-CM

## 2018-02-14 PROCEDURE — 97597 DBRDMT OPN WND 1ST 20 CM/<: CPT | Performed by: PODIATRIST

## 2018-02-14 PROCEDURE — 99024 POSTOP FOLLOW-UP VISIT: CPT | Performed by: PODIATRIST

## 2018-02-14 RX ORDER — DOXYCYCLINE 50 MG/1
100 CAPSULE ORAL
Qty: 10 CAPSULE | Refills: 1 | Status: SHIPPED | OUTPATIENT
Start: 2018-02-14 | End: 2018-04-27 | Stop reason: SDUPTHER

## 2018-02-14 RX ORDER — OXYCODONE HYDROCHLORIDE 10 MG/1
TABLET ORAL
Refills: 0 | COMMUNITY
Start: 2018-01-31 | End: 2018-04-13

## 2018-02-14 NOTE — PROGRESS NOTES
Subjective   Patient ID: Wai Medeiros is a 42 y.o. male STATUS POST:  Post-op of the Left Foot (Irrigation and debridement, wound closure, graft application 1/6/18)  He returns today for follow-up on his left great toe.  He states he's noticed it being more swollen when he wakes up in the morning.  He says the coloring changes.  He says today it is red but sometimes it will be purple and darker in color than what it has been.  He says he's been applying Aquaphor to daily.  Denies any drainage.  Denies any constitutional symptoms.  Is wearing normal tennis shoes.    History of Present Illness    Review of Systems   Constitutional: Negative for diaphoresis, fever and unexpected weight change.   HENT: Negative for dental problem and sore throat.    Eyes: Negative for visual disturbance.   Respiratory: Negative for shortness of breath.    Cardiovascular: Negative for chest pain.   Gastrointestinal: Negative for abdominal pain, constipation, diarrhea, nausea and vomiting.   Genitourinary: Negative for difficulty urinating and frequency.   Neurological: Negative for headaches.   Hematological: Does not bruise/bleed easily.   All other systems reviewed and are negative.      Past Medical History:   Diagnosis Date   • Depression    • Diabetes mellitus    • Hypertension    • Injury of back    • Neuropathy         Past Surgical History:   Procedure Laterality Date   • APPENDECTOMY     • BACK SURGERY     • CHOLECYSTECTOMY     • FOOT IRRIGATION, DEBRIDEMENT AND REPAIR Left 1/6/2018    Procedure: Left foot irrigation and debridement, wound closure, graft application;  Surgeon: Sammy Antoine DPM;  Location: [facility];  Service:    • INCISION AND DRAINAGE FOOT Left 1/3/2018    Procedure: INCISION AND DRAINAGE FOOT ABSCESS, GREAT TOE;  Surgeon: Sammy Antoine DPM;  Location: [facility];  Service:    • SCROTAL SURGERY         Social History     Social History   • Marital status:      Spouse name: N/A   •  Number of children: N/A   • Years of education: N/A     Occupational History   • Not on file.     Social History Main Topics   • Smoking status: Current Every Day Smoker     Packs/day: 1.00     Types: Cigarettes   • Smokeless tobacco: Never Used   • Alcohol use No   • Drug use: No   • Sexual activity: Defer     Other Topics Concern   • Not on file     Social History Narrative       Ready to quit: Not Answered  Counseling given: Not Answered      I have reviewed all of the above social hx, family hx, surgical hx, medications, allergies & ROS and confirm that it is accurate.    No Known Allergies    Objective   Physical Exam   Constitutional: He is oriented to person, place, and time. He appears well-developed and well-nourished.   HENT:   Head: Normocephalic and atraumatic.   Nose: Nose normal.   Eyes: Conjunctivae and EOM are normal. Pupils are equal, round, and reactive to light.   Neck: Normal range of motion.   Pulmonary/Chest: Effort normal.   Abdominal: Soft.   Neurological: He is alert and oriented to person, place, and time.   Skin: Skin is warm.   Psychiatric: He has a normal mood and affect. His behavior is normal. Judgment and thought content normal.   Nursing note and vitals reviewed.    Ortho Exam  Ortho Exam    Left foot exam shows that he is grossly neurovascularly intact with loss of protective sensation.  The hallux is slightly erythematous but it's blanchable.  First MTPJ range of motion is decreased with loading of the forefoot.  There is no pain.  He has a hyperkeratotic callus to the pre-ulcerative lesion on the plantar medial aspect of the hallux.  Once this is debrided there is no obvious open wound or sore or drainage but this is a partial-thickness pre-ulcerative area.  It measures about 2 cm in diameter with white macerated tissue underneath the callus.  There is no pus or purulence or drainage or odor.      Assessment/Plan  status post I&D of the left great toe.  Left hallux ulceration  secondary to hallux limitus, diabetes  Independent Review of Radiographic Studies:      Laboratory and Other Studies:     Medical Decision Making:        Procedures with a 15 blade I performed selective debridement of the callused hyperkeratotic tissue area removing the dead callused tissue and slough.  This is partial thickness in the area measures about 2 cm.  It did show some bleeding with debridement.  [] No procedures were performed in office today.    Wai was seen today for post-op.    Diagnoses and all orders for this visit:    Ulcer of foot, left, limited to breakdown of skin  -     Ambulatory Referral to Podiatry    Other orders  -     doxycycline (MONODOX) 50 MG capsule; Take 2 capsules by mouth Daily.        Recommendations/Plan:  I recommend he continue the Aquaphor or multiple other over-the-counter creams and keratolytics that I recommended for him.  I gave him his options for diabetic shoes and he elected to be referred to Dr. Loo's office here locally for diabetic shoes.  Referral was ordered.  I explained that I'm not overly concerned with infection but I would like to try him on 7-10 days worth of antibiotics to see if he notices any differences in the coloring and/or swelling.  If anything worsens or changes he needs to come back as soon as possible otherwise I'll see him back in about 2-3 weeks.    No Follow-up on file.  Patient agreeable to call or return sooner for any concerns.

## 2018-03-02 ENCOUNTER — OFFICE VISIT (OUTPATIENT)
Dept: ORTHOPEDIC SURGERY | Facility: CLINIC | Age: 43
End: 2018-03-02

## 2018-03-02 VITALS — HEIGHT: 72 IN | WEIGHT: 263 LBS | BODY MASS INDEX: 35.62 KG/M2 | RESPIRATION RATE: 16 BRPM

## 2018-03-02 DIAGNOSIS — Z79.4 TYPE 2 DIABETES MELLITUS WITH OTHER SKIN ULCER, WITH LONG-TERM CURRENT USE OF INSULIN (HCC): ICD-10-CM

## 2018-03-02 DIAGNOSIS — E11.622 TYPE 2 DIABETES MELLITUS WITH OTHER SKIN ULCER, WITH LONG-TERM CURRENT USE OF INSULIN (HCC): ICD-10-CM

## 2018-03-02 DIAGNOSIS — L97.521 ULCER OF FOOT, LEFT, LIMITED TO BREAKDOWN OF SKIN (HCC): Primary | ICD-10-CM

## 2018-03-02 DIAGNOSIS — L84 CALLUS OF FOOT: ICD-10-CM

## 2018-03-02 PROCEDURE — 11042 DBRDMT SUBQ TIS 1ST 20SQCM/<: CPT | Performed by: PODIATRIST

## 2018-03-02 RX ORDER — AMOXICILLIN AND CLAVULANATE POTASSIUM 875; 125 MG/1; MG/1
1 TABLET, FILM COATED ORAL EVERY 12 HOURS SCHEDULED
Qty: 20 TABLET | Refills: 0 | Status: SHIPPED | OUTPATIENT
Start: 2018-03-02 | End: 2018-03-02 | Stop reason: SDUPTHER

## 2018-03-02 RX ORDER — AMOXICILLIN AND CLAVULANATE POTASSIUM 875; 125 MG/1; MG/1
1 TABLET, FILM COATED ORAL EVERY 12 HOURS SCHEDULED
Qty: 20 TABLET | Refills: 0 | Status: ON HOLD | OUTPATIENT
Start: 2018-03-02 | End: 2018-06-03

## 2018-03-02 NOTE — PROGRESS NOTES
Subjective   Patient ID: Wai Medeiros is a 42 y.o. male he returns today for his left foot.  States that he's noticed his foot become more calloused and a wound and noticed odor.  Says at slowly gotten a little bit worse since last time he was here.  He is wearing regular shoes.  He is no longer taking antibiotics.      History of Present Illness                                                   Review of Systems   Skin: Positive for color change and wound.       Past Medical History:   Diagnosis Date   • Depression    • Diabetes mellitus    • Hypertension    • Injury of back    • Neuropathy         Past Surgical History:   Procedure Laterality Date   • APPENDECTOMY     • BACK SURGERY     • CHOLECYSTECTOMY     • FOOT IRRIGATION, DEBRIDEMENT AND REPAIR Left 1/6/2018    Procedure: Left foot irrigation and debridement, wound closure, graft application;  Surgeon: Sammy Antoine DPM;  Location: [facility] OR;  Service:    • INCISION AND DRAINAGE FOOT Left 1/3/2018    Procedure: INCISION AND DRAINAGE FOOT ABSCESS, GREAT TOE;  Surgeon: Sammy Antoine DPM;  Location: [facility];  Service:    • SCROTAL SURGERY         Social History     Social History   • Marital status:      Spouse name: N/A   • Number of children: N/A   • Years of education: N/A     Occupational History   • Not on file.     Social History Main Topics   • Smoking status: Current Every Day Smoker     Packs/day: 1.00     Types: Cigarettes   • Smokeless tobacco: Never Used   • Alcohol use No   • Drug use: No   • Sexual activity: Defer     Other Topics Concern   • Not on file     Social History Narrative       Ready to quit: Not Answered  Counseling given: Not Answered      I have reviewed all of the above social hx, family hx, surgical hx, medications, allergies & ROS and confirm that it is accurate.    No Known Allergies    Objective   Physical Exam   Constitutional: He is oriented to person, place, and time. He appears well-developed and  well-nourished.   HENT:   Head: Normocephalic and atraumatic.   Nose: Nose normal.   Eyes: Conjunctivae and EOM are normal. Pupils are equal, round, and reactive to light.   Neck: Normal range of motion.   Pulmonary/Chest: Effort normal.   Abdominal: Soft.   Neurological: He is alert and oriented to person, place, and time.   Skin: Skin is warm.   Psychiatric: He has a normal mood and affect. His behavior is normal. Judgment and thought content normal.   Nursing note and vitals reviewed.    Ortho Exam  Ortho Exam physical exam of the left foot shows pulses are palpable, pedal hair is noted, gross sensation is intact but protective sensation is diminished.  The first MTPJ and hallux are slightly edematous and erythematous present but is blanchable.  He has a very thick red and brown scab/callus on the plantar medial aspect of the hallux interphalangeal joint.  Once debrided this shows some malodorous macerated tissue underneath.  This reveals a wound that measures over a centimeter in length by half centimeter in width.  It does probe down deep close to bone.  There is no drainage.    Assessment/Plan  left plantar medial hallux wound secondary to hallux limitus, diabetes, probable continued osteomyelitis of the left great toe  Independent Review of Radiographic Studies:      Laboratory and Other Studies:     Medical Decision Making:        Procedures  Debridement Note    Location of debridement: Left plantar medial great toe  Description of procedure: excisional  Type of instrument used: scalpel  Type of tissue removed: necrotic, devitalized and non-viable  Appearance and size of the wound: down to fresh bleeding tissue  Depth of debridement: subcutaneous tissue    Wound measures 1.3 x 0.7 and probes close to bone  I sharply and excisionally debrided the wounds down to healthy bleeding tissue with a good healthy granular base we will continue with the local wound care.    Wai was seen today for follow-up and wound  check.    Diagnoses and all orders for this visit:    Ulcer of foot, left, limited to breakdown of skin    Type 2 diabetes mellitus with other skin ulcer, with long-term current use of insulin    Callus of foot    Other orders  -     amoxicillin-clavulanate (AUGMENTIN) 875-125 MG per tablet; Take 1 tablet by mouth Every 12 (Twelve) Hours.        Recommendations/Plan:  I will start him back on by mouth antibiotics.  Betadine wet-to-dry was placed and I instructed him to change this every 1-2 days.  I advised him to go back to his Darco shoe and he says that the Velcro broke on his so I plan to give him another one today however he left the office before this could be given.  I discussed with him that due to the recurrence of this and potential worsening I would recommend Verma type arthroplasty to decompress the first MTPJ and help with the wound healing.  I discussed the procedure as well as postoperative recovery.  I will see him back next week and we will plan for preoperative visit as well as close monitoring of the great toe.  Hopefully we will get him scheduled for surgery within the next couple of weeks.  If he has any concerns or questions he should let me know.    Return in about 6 days (around 3/8/2018).  Patient agreeable to call or return sooner for any concerns.

## 2018-03-08 ENCOUNTER — OFFICE VISIT (OUTPATIENT)
Dept: ORTHOPEDIC SURGERY | Facility: CLINIC | Age: 43
End: 2018-03-08

## 2018-03-08 VITALS — WEIGHT: 263 LBS | BODY MASS INDEX: 35.62 KG/M2 | HEIGHT: 72 IN | RESPIRATION RATE: 18 BRPM

## 2018-03-08 DIAGNOSIS — E11.42 DIABETIC POLYNEUROPATHY ASSOCIATED WITH TYPE 2 DIABETES MELLITUS (HCC): ICD-10-CM

## 2018-03-08 DIAGNOSIS — L84 CALLUS OF FOOT: ICD-10-CM

## 2018-03-08 DIAGNOSIS — E11.622 TYPE 2 DIABETES MELLITUS WITH OTHER SKIN ULCER, WITH LONG-TERM CURRENT USE OF INSULIN (HCC): Primary | ICD-10-CM

## 2018-03-08 DIAGNOSIS — L97.511 ULCER OF FOOT, CHRONIC, RIGHT, LIMITED TO BREAKDOWN OF SKIN (HCC): ICD-10-CM

## 2018-03-08 DIAGNOSIS — L97.521 ULCER OF FOOT, LEFT, LIMITED TO BREAKDOWN OF SKIN (HCC): ICD-10-CM

## 2018-03-08 DIAGNOSIS — Z79.4 TYPE 2 DIABETES MELLITUS WITH OTHER SKIN ULCER, WITH LONG-TERM CURRENT USE OF INSULIN (HCC): Primary | ICD-10-CM

## 2018-03-08 PROCEDURE — 11042 DBRDMT SUBQ TIS 1ST 20SQCM/<: CPT | Performed by: PODIATRIST

## 2018-03-08 PROCEDURE — 99213 OFFICE O/P EST LOW 20 MIN: CPT | Performed by: PODIATRIST

## 2018-03-08 RX ORDER — DOXYCYCLINE HYCLATE 50 MG/1
50 CAPSULE ORAL 2 TIMES DAILY
Status: ON HOLD | COMMUNITY
Start: 2018-02-14 | End: 2018-04-02

## 2018-03-08 RX ORDER — OXYCODONE HYDROCHLORIDE 10 MG/1
TABLET, FILM COATED, EXTENDED RELEASE ORAL
Refills: 0 | COMMUNITY
Start: 2018-03-03 | End: 2018-04-13

## 2018-03-08 NOTE — PROGRESS NOTES
Subjective   Patient ID: Wai Medeiros is a 42 y.o. male   reTurns today for his left great toe wound and also states he has any place on the right side.  He's been keeping these clean and dry and covered.  He is wearing regular shoes.    History of Present Illness    Pain Score: 3  Pain Location: Toe (Comment which one) (great toe)  Pain Orientation: Left, Right     Pain Descriptors: Aching     Pain Onset: Ongoing        Aggravating Factors: Walking, Standing        Pain Intervention(s): Rest  Result of Injury: No  Work-Related Injury: No    Review of Systems   Musculoskeletal: Positive for arthralgias.       Past Medical History:   Diagnosis Date   • Depression    • Diabetes mellitus    • Hypertension    • Injury of back    • Neuropathy         Past Surgical History:   Procedure Laterality Date   • APPENDECTOMY     • BACK SURGERY     • CHOLECYSTECTOMY     • FOOT IRRIGATION, DEBRIDEMENT AND REPAIR Left 1/6/2018    Procedure: Left foot irrigation and debridement, wound closure, graft application;  Surgeon: Sammy Antoine DPM;  Location: [facility];  Service:    • INCISION AND DRAINAGE FOOT Left 1/3/2018    Procedure: INCISION AND DRAINAGE FOOT ABSCESS, GREAT TOE;  Surgeon: Sammy Antoine DPM;  Location: [facility];  Service:    • SCROTAL SURGERY         Social History     Social History   • Marital status:      Spouse name: N/A   • Number of children: N/A   • Years of education: N/A     Occupational History   • Not on file.     Social History Main Topics   • Smoking status: Current Every Day Smoker     Packs/day: 1.00     Types: Cigarettes   • Smokeless tobacco: Never Used   • Alcohol use No   • Drug use: No   • Sexual activity: Defer     Other Topics Concern   • Not on file     Social History Narrative       Ready to quit: Not Answered  Counseling given: Not Answered      I have reviewed all of the above social hx, family hx, surgical hx, medications, allergies & ROS and confirm that it is  accurate.    No Known Allergies    Objective   Physical Exam   Constitutional: He is oriented to person, place, and time. He appears well-developed and well-nourished.   HENT:   Head: Normocephalic and atraumatic.   Nose: Nose normal.   Eyes: Conjunctivae and EOM are normal. Pupils are equal, round, and reactive to light.   Neck: Normal range of motion.   Pulmonary/Chest: Effort normal.   Abdominal: Soft.   Neurological: He is alert and oriented to person, place, and time.   Skin: Skin is warm.   Psychiatric: He has a normal mood and affect. His behavior is normal. Judgment and thought content normal.   Nursing note and vitals reviewed.    Ortho Exam  Ortho Exam  The left hallux plantar medial interphalangeal joint is dry and stable.  There still some slight blanchable erythema and edema but the wound is dried and firm and calloused and improved.  He still grossly neurovascularly intact to left foot.  Right foot shows a large blood blister on the plantar medial right hallux IPJ.  There is callused hyperkeratotic tissue present.  Once this is all debrided and removed there is a full-thickness ulceration subcutaneous tissue layer noted there as well.  Both wounds seem to be secondary to hallux limitus.      Assessment/Plan  bilateral hallux ulcerations secondary to hallux limitus/rigidus, diabetes, neuropathy  Independent Review of Radiographic Studies:      Laboratory and Other Studies:     Medical Decision Making:        Procedures  Debridement Note    Location of debridement: Right great toe, left great toe  Description of procedure: excisional  Type of instrument used: scalpel  Type of tissue removed: devitalized and non-viable  Appearance and size of the wound: down to fresh bleeding tissue  Depth of debridement: subcutaneous tissue    Left great toe wound measures one by one.  Right great toe wound measures 1.7 x 1.5  I sharply and excisionally debrided the wounds down to healthy bleeding tissue with a good  healthy granular base we will continue with the local wound care.    Wai was seen today for post-op and follow-up.    Diagnoses and all orders for this visit:    Type 2 diabetes mellitus with other skin ulcer, with long-term current use of insulin    Callus of foot    Diabetic polyneuropathy associated with type 2 diabetes mellitus    Ulcer of foot, left, limited to breakdown of skin    Ulcer of foot, chronic, right, limited to breakdown of skin        Recommendations/Plan:  I recommend he keep the toes clean and dry with Betadine.  If it began becoming too firm and hard and He can change to some type of over-the-counter moisturizer wound gel.  He will be given to Darco wedge shoes for ambulation and try to minimize weightbearing.  I discussed that in my opinion would really need to plan to proceed with arthroplasty of the joint to help increase range of motion before these become infected and require amputation.  He seems to be agreeable.  We discussed the procedures in detail as well as postoperative recovery and course.  He will follow up in 2 weeks and at that point we'll look to schedule at least one side.  Call me if any questions or concerns.    Return in about 2 weeks (around 3/22/2018).  Patient agreeable to call or return sooner for any concerns.

## 2018-03-23 ENCOUNTER — OFFICE VISIT (OUTPATIENT)
Dept: ORTHOPEDIC SURGERY | Facility: CLINIC | Age: 43
End: 2018-03-23

## 2018-03-23 VITALS — HEIGHT: 72 IN | RESPIRATION RATE: 18 BRPM | BODY MASS INDEX: 35.62 KG/M2 | WEIGHT: 263 LBS

## 2018-03-23 DIAGNOSIS — M86.9 OSTEOMYELITIS OF ANKLE OR FOOT: ICD-10-CM

## 2018-03-23 DIAGNOSIS — E11.42 DIABETIC POLYNEUROPATHY ASSOCIATED WITH TYPE 2 DIABETES MELLITUS (HCC): ICD-10-CM

## 2018-03-23 DIAGNOSIS — Z79.4 TYPE 2 DIABETES MELLITUS WITH OTHER SKIN ULCER, WITH LONG-TERM CURRENT USE OF INSULIN (HCC): Primary | ICD-10-CM

## 2018-03-23 DIAGNOSIS — L84 CALLUS OF FOOT: ICD-10-CM

## 2018-03-23 DIAGNOSIS — L97.521 ULCER OF FOOT, LEFT, LIMITED TO BREAKDOWN OF SKIN (HCC): ICD-10-CM

## 2018-03-23 DIAGNOSIS — E11.622 TYPE 2 DIABETES MELLITUS WITH OTHER SKIN ULCER, WITH LONG-TERM CURRENT USE OF INSULIN (HCC): Primary | ICD-10-CM

## 2018-03-23 DIAGNOSIS — L97.511 ULCER OF FOOT, CHRONIC, RIGHT, LIMITED TO BREAKDOWN OF SKIN (HCC): ICD-10-CM

## 2018-03-23 PROBLEM — E11.9 DIABETES MELLITUS (HCC): Status: ACTIVE | Noted: 2018-03-23

## 2018-03-23 PROCEDURE — 11042 DBRDMT SUBQ TIS 1ST 20SQCM/<: CPT | Performed by: PODIATRIST

## 2018-03-23 PROCEDURE — 99213 OFFICE O/P EST LOW 20 MIN: CPT | Performed by: PODIATRIST

## 2018-03-23 RX ORDER — SODIUM CHLORIDE, SODIUM LACTATE, POTASSIUM CHLORIDE, CALCIUM CHLORIDE 600; 310; 30; 20 MG/100ML; MG/100ML; MG/100ML; MG/100ML
100 INJECTION, SOLUTION INTRAVENOUS CONTINUOUS
Status: CANCELLED | OUTPATIENT
Start: 2018-03-23

## 2018-03-23 NOTE — PROGRESS NOTES
Subjective   Patient ID: Wai Medeiros is a 42 y.o. male with diabetes returns for his bilateral great toe wounds.  States he tried wearing both of the shoes given last time and Following so he could not do that.  Comes in today wearing regular tennis shoes.  Has been keeping the wounds clean and dry and covered.      History of Present Illness                                                   Review of Systems   Constitutional: Negative for diaphoresis, fever and unexpected weight change.   HENT: Negative for dental problem and sore throat.    Eyes: Negative for visual disturbance.   Respiratory: Negative for shortness of breath.    Cardiovascular: Negative for chest pain.   Gastrointestinal: Negative for abdominal pain, constipation, diarrhea, nausea and vomiting.   Genitourinary: Negative for difficulty urinating and frequency.   Skin: Positive for wound.   Neurological: Negative for headaches.   Hematological: Does not bruise/bleed easily.   All other systems reviewed and are negative.      Past Medical History:   Diagnosis Date   • Depression    • Diabetes mellitus    • Hypertension    • Injury of back    • Neuropathy         Past Surgical History:   Procedure Laterality Date   • APPENDECTOMY     • BACK SURGERY     • CHOLECYSTECTOMY     • FOOT IRRIGATION, DEBRIDEMENT AND REPAIR Left 1/6/2018    Procedure: Left foot irrigation and debridement, wound closure, graft application;  Surgeon: Sammy Antoine DPM;  Location: [facility] OR;  Service:    • INCISION AND DRAINAGE FOOT Left 1/3/2018    Procedure: INCISION AND DRAINAGE FOOT ABSCESS, GREAT TOE;  Surgeon: Sammy Antoine DPM;  Location: [facility];  Service:    • SCROTAL SURGERY         Social History     Social History   • Marital status:      Spouse name: N/A   • Number of children: N/A   • Years of education: N/A     Occupational History   • Not on file.     Social History Main Topics   • Smoking status: Current Every Day Smoker      Packs/day: 1.00     Types: Cigarettes   • Smokeless tobacco: Never Used   • Alcohol use No   • Drug use: No   • Sexual activity: Defer     Other Topics Concern   • Not on file     Social History Narrative   • No narrative on file       Ready to quit: No  Counseling given: Yes      I have reviewed all of the above social hx, family hx, surgical hx, medications, allergies & ROS and confirm that it is accurate.    No Known Allergies    Objective   Physical Exam   Constitutional: He is oriented to person, place, and time. He appears well-developed and well-nourished.   HENT:   Head: Normocephalic and atraumatic.   Nose: Nose normal.   Eyes: Conjunctivae and EOM are normal. Pupils are equal, round, and reactive to light.   Neck: Normal range of motion.   Cardiovascular: Normal rate.    Pulmonary/Chest: Effort normal.   Abdominal: Soft.   Neurological: He is alert and oriented to person, place, and time.   Skin: Skin is warm.   Psychiatric: He has a normal mood and affect. His behavior is normal. Judgment and thought content normal.   Nursing note and vitals reviewed.    Ortho Exam  Ortho Exam  Both feet are grossly neurovascular intact, pulses palpable, pedal hair noted, no edema noted, gross sensation intact but protective sensation diminished.  He does complain of some tenderness and soreness to the plantar aspect of both great toes.  On the plantar medial aspect of both hallux interphalangeal joints there are hyperkeratotic hyperpigmented blood-filled full-thickness ulcerations.  Once the hyperkeratotic tissue is removed other reveal a full-thickness ulcerations into the subcutaneous tissue.  Right side measured approximately 1.5 x 1.5.  Left side measures one by one.  Minimal erythema or edema noted to either great toe.  No odor present.  Bleeding present with debridement.      Assessment/Plan  diabetes, bilateral left great toe wound secondary to hallux limitus  Independent Review of Radiographic Studies:       Laboratory and Other Studies:     Medical Decision Making:                Procedures  Debridement Note    Location of debridement: Right great toe and left great toe  Description of procedure: excisional  Type of instrument used: scalpel  Type of tissue removed: devitalized and non-viable  Appearance and size of the wound: down to fresh bleeding tissue  Depth of debridement: subcutaneous tissue    Wound on left foot measures one by one.  Right foot measures 1.5 x 1.5  I sharply and excisionally debrided the wounds down to healthy bleeding tissue with a good healthy granular base we will continue with the local wound care.    Wai was seen today for wound check, follow-up and follow-up.    Diagnoses and all orders for this visit:    Type 2 diabetes mellitus with other skin ulcer, with long-term current use of insulin    Ulcer of foot, left, limited to breakdown of skin    Ulcer of foot, chronic, right, limited to breakdown of skin    Osteomyelitis of ankle or foot    Diabetic polyneuropathy associated with type 2 diabetes mellitus    Callus of foot          Recommendations/Plan:  The wounds were dressed today.  Given the nature of both his wounds on my advised him to go ahead and proceed with arthroplasty to help increase range of motion of the first MTPJ and help with wound healing.  He has pictures of significant drainage of the right side and it seems to almost be deteriorating and worse than the left.  The left side however, has been present for a much greater time.  And I would recommend proceeding with that side first given his history of osteomyelitis.  I discussed the procedure in detail as well as all the risks versus benefits and potential complications.  I advised him and recommended doing one side at a time rather than both at once in the case complications were to arise.  I advised him that typically once the joint range of motion is increased the wounds will heal quite quickly.  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  All his questions were answered.  Consent will be obtained.  We will plan the left foot bunionectomy/Verma arthroplasty with wound debridement and grafting within the next couple of weeks.  Surgical orders will be placed.  He'll be sent for preoperative testing.  Follow-up postoperatively.  Continue local wound care to the right foot.    Return 1wk post op.  Patient agreeable to call or return sooner for any concerns.

## 2018-03-26 ENCOUNTER — APPOINTMENT (OUTPATIENT)
Dept: PREADMISSION TESTING | Facility: HOSPITAL | Age: 43
End: 2018-03-26

## 2018-03-26 VITALS — HEIGHT: 72 IN | WEIGHT: 265 LBS | BODY MASS INDEX: 35.89 KG/M2

## 2018-03-26 DIAGNOSIS — Z79.4 TYPE 2 DIABETES MELLITUS WITH OTHER SKIN ULCER, WITH LONG-TERM CURRENT USE OF INSULIN (HCC): ICD-10-CM

## 2018-03-26 DIAGNOSIS — E11.622 TYPE 2 DIABETES MELLITUS WITH OTHER SKIN ULCER, WITH LONG-TERM CURRENT USE OF INSULIN (HCC): ICD-10-CM

## 2018-03-26 LAB
ALBUMIN SERPL-MCNC: 4.8 G/DL (ref 3.5–5)
ALBUMIN/GLOB SERPL: 1.5 G/DL (ref 1–2)
ALP SERPL-CCNC: 84 U/L (ref 38–126)
ALT SERPL W P-5'-P-CCNC: 54 U/L (ref 13–69)
ANION GAP SERPL CALCULATED.3IONS-SCNC: 20 MMOL/L (ref 10–20)
AST SERPL-CCNC: 31 U/L (ref 15–46)
BASOPHILS # BLD AUTO: 0.09 10*3/MM3 (ref 0–0.2)
BASOPHILS NFR BLD AUTO: 0.5 % (ref 0–2.5)
BILIRUB SERPL-MCNC: 0.7 MG/DL (ref 0.2–1.3)
BUN BLD-MCNC: 17 MG/DL (ref 7–20)
BUN/CREAT SERPL: 21.3 (ref 6.3–21.9)
CALCIUM SPEC-SCNC: 9.5 MG/DL (ref 8.4–10.2)
CHLORIDE SERPL-SCNC: 99 MMOL/L (ref 98–107)
CO2 SERPL-SCNC: 27 MMOL/L (ref 26–30)
CREAT BLD-MCNC: 0.8 MG/DL (ref 0.6–1.3)
DEPRECATED RDW RBC AUTO: 40.7 FL (ref 37–54)
EOSINOPHIL # BLD AUTO: 0.58 10*3/MM3 (ref 0–0.7)
EOSINOPHIL NFR BLD AUTO: 3.4 % (ref 0–7)
ERYTHROCYTE [DISTWIDTH] IN BLOOD BY AUTOMATED COUNT: 12.3 % (ref 11.5–14.5)
GFR SERPL CREATININE-BSD FRML MDRD: 107 ML/MIN/1.73
GLOBULIN UR ELPH-MCNC: 3.2 GM/DL
GLUCOSE BLD-MCNC: 208 MG/DL (ref 74–98)
HCT VFR BLD AUTO: 53.8 % (ref 42–52)
HGB BLD-MCNC: 18.4 G/DL (ref 14–18)
IMM GRANULOCYTES # BLD: 0.11 10*3/MM3 (ref 0–0.06)
IMM GRANULOCYTES NFR BLD: 0.6 % (ref 0–0.6)
LYMPHOCYTES # BLD AUTO: 3.92 10*3/MM3 (ref 0.6–3.4)
LYMPHOCYTES NFR BLD AUTO: 22.8 % (ref 10–50)
MCH RBC QN AUTO: 31 PG (ref 27–31)
MCHC RBC AUTO-ENTMCNC: 34.2 G/DL (ref 30–37)
MCV RBC AUTO: 90.6 FL (ref 80–94)
MONOCYTES # BLD AUTO: 0.86 10*3/MM3 (ref 0–0.9)
MONOCYTES NFR BLD AUTO: 5 % (ref 0–12)
NEUTROPHILS # BLD AUTO: 11.61 10*3/MM3 (ref 2–6.9)
NEUTROPHILS NFR BLD AUTO: 67.7 % (ref 37–80)
NRBC BLD MANUAL-RTO: 0 /100 WBC (ref 0–0)
PLATELET # BLD AUTO: 295 10*3/MM3 (ref 130–400)
PMV BLD AUTO: 10.3 FL (ref 6–12)
POTASSIUM BLD-SCNC: 5 MMOL/L (ref 3.5–5.1)
PROT SERPL-MCNC: 8 G/DL (ref 6.3–8.2)
RBC # BLD AUTO: 5.94 10*6/MM3 (ref 4.7–6.1)
SODIUM BLD-SCNC: 141 MMOL/L (ref 137–145)
WBC NRBC COR # BLD: 17.17 10*3/MM3 (ref 4.8–10.8)

## 2018-03-26 PROCEDURE — 36415 COLL VENOUS BLD VENIPUNCTURE: CPT

## 2018-03-26 PROCEDURE — 85025 COMPLETE CBC W/AUTO DIFF WBC: CPT | Performed by: PODIATRIST

## 2018-03-26 PROCEDURE — 80053 COMPREHEN METABOLIC PANEL: CPT | Performed by: PODIATRIST

## 2018-04-02 ENCOUNTER — ANESTHESIA (OUTPATIENT)
Dept: PERIOP | Facility: HOSPITAL | Age: 43
End: 2018-04-02

## 2018-04-02 ENCOUNTER — HOSPITAL ENCOUNTER (OUTPATIENT)
Facility: HOSPITAL | Age: 43
Setting detail: HOSPITAL OUTPATIENT SURGERY
Discharge: HOME | End: 2018-04-02
Attending: PODIATRIST | Admitting: PODIATRIST

## 2018-04-02 ENCOUNTER — ANESTHESIA EVENT (OUTPATIENT)
Dept: PERIOP | Facility: HOSPITAL | Age: 43
End: 2018-04-02

## 2018-04-02 VITALS
DIASTOLIC BLOOD PRESSURE: 72 MMHG | OXYGEN SATURATION: 97 % | HEART RATE: 92 BPM | TEMPERATURE: 97.7 F | SYSTOLIC BLOOD PRESSURE: 128 MMHG | RESPIRATION RATE: 20 BRPM

## 2018-04-02 DIAGNOSIS — E11.622 TYPE 2 DIABETES MELLITUS WITH OTHER SKIN ULCER, WITH LONG-TERM CURRENT USE OF INSULIN (HCC): ICD-10-CM

## 2018-04-02 DIAGNOSIS — Z79.4 TYPE 2 DIABETES MELLITUS WITH OTHER SKIN ULCER, WITH LONG-TERM CURRENT USE OF INSULIN (HCC): ICD-10-CM

## 2018-04-02 PROBLEM — E11.9 DIABETES MELLITUS: Status: RESOLVED | Noted: 2018-03-23 | Resolved: 2018-04-02

## 2018-04-02 LAB
GLUCOSE BLDC GLUCOMTR-MCNC: 167 MG/DL (ref 70–130)
GRAM STN SPEC: NORMAL

## 2018-04-02 PROCEDURE — 87070 CULTURE OTHR SPECIMN AEROBIC: CPT | Performed by: PODIATRIST

## 2018-04-02 PROCEDURE — 15275 SKIN SUB GRAFT FACE/NK/HF/G: CPT | Performed by: PODIATRIST

## 2018-04-02 PROCEDURE — 88304 TISSUE EXAM BY PATHOLOGIST: CPT | Performed by: PODIATRIST

## 2018-04-02 PROCEDURE — 87205 SMEAR GRAM STAIN: CPT | Performed by: PODIATRIST

## 2018-04-02 PROCEDURE — 87176 TISSUE HOMOGENIZATION CULTR: CPT | Performed by: PODIATRIST

## 2018-04-02 PROCEDURE — 28292 COR HLX VLGS RSC PRX PHLX BS: CPT | Performed by: PODIATRIST

## 2018-04-02 PROCEDURE — 82962 GLUCOSE BLOOD TEST: CPT

## 2018-04-02 PROCEDURE — 25010000002 PROPOFOL 10 MG/ML EMULSION: Performed by: NURSE ANESTHETIST, CERTIFIED REGISTERED

## 2018-04-02 PROCEDURE — 25010000002 ONDANSETRON PER 1 MG: Performed by: NURSE ANESTHETIST, CERTIFIED REGISTERED

## 2018-04-02 PROCEDURE — 88311 DECALCIFY TISSUE: CPT | Performed by: PODIATRIST

## 2018-04-02 PROCEDURE — 25010000002 DEXAMETHASONE PER 1 MG: Performed by: NURSE ANESTHETIST, CERTIFIED REGISTERED

## 2018-04-02 PROCEDURE — 25010000002 FENTANYL CITRATE (PF) 100 MCG/2ML SOLUTION: Performed by: NURSE ANESTHETIST, CERTIFIED REGISTERED

## 2018-04-02 PROCEDURE — 87075 CULTR BACTERIA EXCEPT BLOOD: CPT | Performed by: PODIATRIST

## 2018-04-02 PROCEDURE — 25010000003 CEFAZOLIN PER 500 MG: Performed by: PODIATRIST

## 2018-04-02 DEVICE — ALLOGRFT AMNIO AMNIOFIX 4X6CM: Type: IMPLANTABLE DEVICE | Site: FOOT | Status: FUNCTIONAL

## 2018-04-02 RX ORDER — ONDANSETRON 2 MG/ML
4 INJECTION INTRAMUSCULAR; INTRAVENOUS ONCE AS NEEDED
Status: CANCELLED | OUTPATIENT
Start: 2018-04-02 | End: 2018-04-02

## 2018-04-02 RX ORDER — BUPIVACAINE HYDROCHLORIDE 5 MG/ML
INJECTION, SOLUTION EPIDURAL; INTRACAUDAL AS NEEDED
Status: DISCONTINUED | OUTPATIENT
Start: 2018-04-02 | End: 2018-04-02 | Stop reason: HOSPADM

## 2018-04-02 RX ORDER — LIDOCAINE HYDROCHLORIDE 20 MG/ML
INJECTION, SOLUTION INFILTRATION; PERINEURAL AS NEEDED
Status: DISCONTINUED | OUTPATIENT
Start: 2018-04-02 | End: 2018-04-02 | Stop reason: SURG

## 2018-04-02 RX ORDER — HYDROCODONE BITARTRATE AND ACETAMINOPHEN 5; 325 MG/1; MG/1
1-2 TABLET ORAL EVERY 4 HOURS PRN
Qty: 20 TABLET | Refills: 0 | Status: SHIPPED | OUTPATIENT
Start: 2018-04-02 | End: 2018-06-07 | Stop reason: HOSPADM

## 2018-04-02 RX ORDER — LIDOCAINE HYDROCHLORIDE 10 MG/ML
INJECTION, SOLUTION INFILTRATION; PERINEURAL AS NEEDED
Status: DISCONTINUED | OUTPATIENT
Start: 2018-04-02 | End: 2018-04-02 | Stop reason: HOSPADM

## 2018-04-02 RX ORDER — FENTANYL CITRATE 50 UG/ML
INJECTION, SOLUTION INTRAMUSCULAR; INTRAVENOUS AS NEEDED
Status: DISCONTINUED | OUTPATIENT
Start: 2018-04-02 | End: 2018-04-02 | Stop reason: SURG

## 2018-04-02 RX ORDER — ONDANSETRON 2 MG/ML
INJECTION INTRAMUSCULAR; INTRAVENOUS AS NEEDED
Status: DISCONTINUED | OUTPATIENT
Start: 2018-04-02 | End: 2018-04-02 | Stop reason: SURG

## 2018-04-02 RX ORDER — PROPOFOL 10 MG/ML
VIAL (ML) INTRAVENOUS AS NEEDED
Status: DISCONTINUED | OUTPATIENT
Start: 2018-04-02 | End: 2018-04-02 | Stop reason: SURG

## 2018-04-02 RX ORDER — MEPERIDINE HYDROCHLORIDE 50 MG/ML
25 INJECTION INTRAMUSCULAR; INTRAVENOUS; SUBCUTANEOUS
Status: CANCELLED | OUTPATIENT
Start: 2018-04-02 | End: 2018-04-02

## 2018-04-02 RX ORDER — DEXAMETHASONE SODIUM PHOSPHATE 4 MG/ML
INJECTION, SOLUTION INTRA-ARTICULAR; INTRALESIONAL; INTRAMUSCULAR; INTRAVENOUS; SOFT TISSUE AS NEEDED
Status: DISCONTINUED | OUTPATIENT
Start: 2018-04-02 | End: 2018-04-02 | Stop reason: SURG

## 2018-04-02 RX ORDER — SODIUM CHLORIDE, SODIUM LACTATE, POTASSIUM CHLORIDE, CALCIUM CHLORIDE 600; 310; 30; 20 MG/100ML; MG/100ML; MG/100ML; MG/100ML
100 INJECTION, SOLUTION INTRAVENOUS CONTINUOUS
Status: DISCONTINUED | OUTPATIENT
Start: 2018-04-02 | End: 2018-04-02 | Stop reason: HOSPADM

## 2018-04-02 RX ORDER — MORPHINE SULFATE 2 MG/ML
2 INJECTION, SOLUTION INTRAMUSCULAR; INTRAVENOUS
Status: CANCELLED | OUTPATIENT
Start: 2018-04-02

## 2018-04-02 RX ADMIN — LIDOCAINE HYDROCHLORIDE 100 MG: 20 INJECTION, SOLUTION INFILTRATION; PERINEURAL at 09:31

## 2018-04-02 RX ADMIN — FENTANYL CITRATE 50 MCG: 50 INJECTION, SOLUTION INTRAMUSCULAR; INTRAVENOUS at 09:31

## 2018-04-02 RX ADMIN — ONDANSETRON 4 MG: 2 INJECTION INTRAMUSCULAR; INTRAVENOUS at 09:32

## 2018-04-02 RX ADMIN — CEFAZOLIN SODIUM 2 G: 2 SOLUTION INTRAVENOUS at 09:26

## 2018-04-02 RX ADMIN — FENTANYL CITRATE 50 MCG: 50 INJECTION, SOLUTION INTRAMUSCULAR; INTRAVENOUS at 09:39

## 2018-04-02 RX ADMIN — PROPOFOL 600 MG: 10 INJECTION, EMULSION INTRAVENOUS at 10:05

## 2018-04-02 RX ADMIN — DEXAMETHASONE SODIUM PHOSPHATE 8 MG: 4 INJECTION, SOLUTION INTRAMUSCULAR; INTRAVENOUS at 09:32

## 2018-04-02 RX ADMIN — SODIUM CHLORIDE, POTASSIUM CHLORIDE, SODIUM LACTATE AND CALCIUM CHLORIDE 100 ML/HR: 600; 310; 30; 20 INJECTION, SOLUTION INTRAVENOUS at 09:05

## 2018-04-02 NOTE — ANESTHESIA POSTPROCEDURE EVALUATION
Patient: Wai Medeiros    Procedure Summary     Date:  04/02/18 Room / Location:  [facility] OR  /  [facility]    Anesthesia Start:  0926 Anesthesia Stop:  1009    Procedure:  Left foot Verma arthroplasty, wound debridement and grafting (Left Ankle) Diagnosis:       Type 2 diabetes mellitus with other skin ulcer, with long-term current use of insulin      (Type 2 diabetes mellitus with other skin ulcer, with long-term current use of insulin [E11.622, Z79.4])    Surgeon:  Sammy Antoine DPM Provider:  Hong Liz CRNA    Anesthesia Type:  MAC ASA Status:  3          Anesthesia Type: MAC  Last vitals  BP   115/65 (04/02/18 1014)   Temp   97.7 °F (36.5 °C) (04/02/18 1014)   Pulse   88 (04/02/18 1014)   Resp   17 (04/02/18 1014)     SpO2   95 % (04/02/18 1014)     Post Anesthesia Care and Evaluation    Patient location during evaluation: PHASE II  Patient participation: complete - patient participated  Level of consciousness: awake  Pain score: 1  Pain management: adequate  Airway patency: patent  Anesthetic complications: No anesthetic complications  PONV Status: controlled  Cardiovascular status: acceptable and stable  Respiratory status: acceptable and nasal cannula  Hydration status: acceptable

## 2018-04-02 NOTE — ANESTHESIA PREPROCEDURE EVALUATION
Anesthesia Evaluation     Patient summary reviewed and Nursing notes reviewed   no history of anesthetic complications:  NPO Solid Status: > 8 hours  NPO Liquid Status: > 8 hours           Airway   Mallampati: II  TM distance: >3 FB  Neck ROM: full  Possible difficult intubation  Dental      Pulmonary    (+) a smoker Current Smoked day of surgery,   Cardiovascular   Exercise tolerance: good (4-7 METS)    ECG reviewed  Rhythm: regular  Rate: normal    (+) hypertension, PVD, hyperlipidemia,     ROS comment: EKG Normal    Neuro/Psych  (+) psychiatric history Anxiety and Depression,     GI/Hepatic/Renal/Endo    (+) obesity, morbid obesity, GERD,  liver disease, diabetes mellitus type 2 poorly controlled using insulin,     Musculoskeletal     (+) arthralgias, back pain, chronic pain,   Abdominal    Substance History      OB/GYN          Other   (+) arthritis                       Anesthesia Plan    ASA 3     MAC   (Pt told that intravenous sedation will be used as the primary anesthetic along with local anesthesia if necessary. Every effort will be made to make sure the patient is comfortable.     The patient was told they may or may not have recall for the procedure. It was further explained that if the MAC was not adequate that a general anesthetic with either an LMA or endotracheal tube would be required.   Will proceed with the plan of care.)  intravenous induction   Anesthetic plan and risks discussed with patient.

## 2018-04-06 LAB
BACTERIA SPEC AEROBE CULT: NO GROWTH
BACTERIA SPEC ANAEROBE CULT: NO GROWTH

## 2018-04-10 LAB
LAB AP CASE REPORT: NORMAL
Lab: NORMAL
PATH REPORT.FINAL DX SPEC: NORMAL

## 2018-04-13 ENCOUNTER — OFFICE VISIT (OUTPATIENT)
Dept: ORTHOPEDIC SURGERY | Facility: CLINIC | Age: 43
End: 2018-04-13

## 2018-04-13 VITALS — WEIGHT: 265 LBS | RESPIRATION RATE: 18 BRPM | BODY MASS INDEX: 35.89 KG/M2 | HEIGHT: 72 IN

## 2018-04-13 DIAGNOSIS — L84 CALLUS OF FOOT: ICD-10-CM

## 2018-04-13 DIAGNOSIS — E11.42 DIABETIC POLYNEUROPATHY ASSOCIATED WITH TYPE 2 DIABETES MELLITUS (HCC): ICD-10-CM

## 2018-04-13 DIAGNOSIS — M86.9 OSTEOMYELITIS OF ANKLE OR FOOT: Primary | ICD-10-CM

## 2018-04-13 DIAGNOSIS — Z79.4 TYPE 2 DIABETES MELLITUS WITH OTHER SKIN ULCER, WITH LONG-TERM CURRENT USE OF INSULIN (HCC): ICD-10-CM

## 2018-04-13 DIAGNOSIS — M20.5X2 HALLUX LIMITUS OF LEFT FOOT: ICD-10-CM

## 2018-04-13 DIAGNOSIS — E11.622 TYPE 2 DIABETES MELLITUS WITH OTHER SKIN ULCER, WITH LONG-TERM CURRENT USE OF INSULIN (HCC): ICD-10-CM

## 2018-04-13 PROCEDURE — 99024 POSTOP FOLLOW-UP VISIT: CPT | Performed by: PODIATRIST

## 2018-04-13 RX ORDER — SIMVASTATIN 40 MG
40 TABLET ORAL
Refills: 3 | COMMUNITY
Start: 2018-04-11 | End: 2021-08-18

## 2018-04-13 RX ORDER — OXYCODONE HYDROCHLORIDE 15 MG/1
15 TABLET, FILM COATED, EXTENDED RELEASE ORAL EVERY 12 HOURS SCHEDULED
COMMUNITY
Start: 2018-04-30 | End: 2021-08-18

## 2018-04-13 NOTE — PROGRESS NOTES
Subjective   Patient ID: Wai Medeiros is a 42 y.o. male STATUS POST:  Post-op of the Left Foot (Verma arthroplasty, wound debridement and grafting 4/2/18)  Returns today for his left foot postoperative visit.  Denies any complaints of pain or constitutional symptoms.    History of Present Illness    Review of Systems   Constitutional: Negative for diaphoresis, fever and unexpected weight change.   HENT: Negative for dental problem and sore throat.    Eyes: Negative for visual disturbance.   Respiratory: Negative for shortness of breath.    Cardiovascular: Negative for chest pain.   Gastrointestinal: Negative for abdominal pain, constipation, diarrhea, nausea and vomiting.   Genitourinary: Negative for difficulty urinating and frequency.   Skin: Positive for wound.   Neurological: Negative for headaches.   Hematological: Does not bruise/bleed easily.   All other systems reviewed and are negative.      Past Medical History:   Diagnosis Date   • Anxiety and depression    • Constipation    • Depression    • Diabetes mellitus    • Fatty liver    • GERD (gastroesophageal reflux disease)    • High cholesterol    • History of methicillin resistant staphylococcus aureus (MRSA) 2013    RIGHT THIGH   • Hypertension    • Injury of back    • Neuropathy    • Pancreatitis    • Wears glasses         Past Surgical History:   Procedure Laterality Date   • APPENDECTOMY     • BACK SURGERY      L4-5 LAMINECTOMY   • CHOLECYSTECTOMY     • FOOT ARTHROPLASTY Left 4/2/2018    Procedure: Left foot Verma arthroplasty, wound debridement and grafting;  Surgeon: Sammy Antoine DPM;  Location: [facility] OR;  Service: Orthopedics   • FOOT IRRIGATION, DEBRIDEMENT AND REPAIR Left 1/6/2018    Procedure: Left foot irrigation and debridement, wound closure, graft application;  Surgeon: Sammy Antoine DPM;  Location: [facility] OR;  Service:    • INCISION AND DRAINAGE FOOT Left 1/3/2018    Procedure: INCISION AND DRAINAGE FOOT ABSCESS, GREAT  TOE;  Surgeon: Sammy Antoine DPM;  Location: [facility];  Service:    • SCROTAL SURGERY     • WISDOM TOOTH EXTRACTION         Social History     Social History   • Marital status:      Spouse name: N/A   • Number of children: N/A   • Years of education: N/A     Occupational History   • Not on file.     Social History Main Topics   • Smoking status: Current Every Day Smoker     Packs/day: 1.00     Types: Cigarettes   • Smokeless tobacco: Current User     Types: Snuff   • Alcohol use No   • Drug use: No   • Sexual activity: Defer     Other Topics Concern   • Not on file     Social History Narrative   • No narrative on file       Ready to quit: No  Counseling given: Yes      I have reviewed all of the above social hx, family hx, surgical hx, medications, allergies & ROS and confirm that it is accurate.    No Known Allergies    Objective   Physical Exam   Constitutional: He is oriented to person, place, and time. He appears well-developed and well-nourished.   HENT:   Head: Normocephalic and atraumatic.   Nose: Nose normal.   Eyes: Conjunctivae and EOM are normal. Pupils are equal, round, and reactive to light.   Neck: Normal range of motion.   Neurological: He is alert and oriented to person, place, and time.   Skin: Skin is warm.   Psychiatric: He has a normal mood and affect. His behavior is normal. Judgment and thought content normal.   Nursing note and vitals reviewed.    Ortho Exam  Ortho Exam left foot exam:  Incisions are clean dry and intact, well coapted, no sign of dehiscence  Sutures are intact and no sign of infection is present  The plantar medial hallux wound area is still slightly soft but seems to be healing well and graft well adhered.  There is no erythema or edema.  Good range of motion of the first MTPJ is noted.    Assessment/Plan  status post 2 weeks from left foot Verma arthroplasty with wound debridement and grafting.  Independent Review of Radiographic Studies:      Laboratory and  Other Studies:     Medical Decision Making:        Procedures  [] No procedures were performed in office today.    Wai was seen today for post-op.    Diagnoses and all orders for this visit:    Osteomyelitis of ankle or foot    Type 2 diabetes mellitus with other skin ulcer, with long-term current use of insulin    Hallux limitus of left foot    Callus of foot    Diabetic polyneuropathy associated with type 2 diabetes mellitus          Recommendations/Plan:  Sutures were removed.  He was instructed to continue softening and padding the great toe area.  He will continue weightbearing in the [place] shoe to help continue to offload the great toe until the skin has reconditioned.  He can bathe and shower as normal.  Notify me if any questions or concerns.  Otherwise, follow up 2 weeks.    Return in about 2 weeks (around 4/27/2018).  Patient agreeable to call or return sooner for any concerns.

## 2018-04-26 ENCOUNTER — OFFICE VISIT (OUTPATIENT)
Dept: PSYCHIATRY | Facility: CLINIC | Age: 43
End: 2018-04-26

## 2018-04-26 DIAGNOSIS — G89.29 OTHER CHRONIC PAIN: ICD-10-CM

## 2018-04-26 DIAGNOSIS — F43.21 SITUATIONAL DEPRESSION: Primary | ICD-10-CM

## 2018-04-26 PROCEDURE — 90791 PSYCH DIAGNOSTIC EVALUATION: CPT | Performed by: PSYCHOLOGIST

## 2018-04-26 NOTE — PROGRESS NOTES
"PROGRESS NOTE    Data:  Wai Medeiros is a 42 y.o. male who met with the undersigned for a scheduled individualoutpatient psychotherapy session from 12:45 - 1:25pm      Clinical Maneuvering/Intervention:      Pt talked about struggling with chronic pain in his back and leg. 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 He struggled with depression when he had to quit working, but since that time has learned how to cope with it and has been on medication to help (Wellbutrin and Cymbalta). A psychological evaluation was conducted in order to assess past and current level of functioning. Areas assessed included, but were not limited to: perception of social support, perception of ability to face and deal with challenges in life (positive functioning), anxiety symptoms, depressive symptoms, perspective on beliefs/belief system, coping skills for stress, intelligence level, etc. Therapeutic rapport was established. The pt is , does not have children, and cares for mother who recently had a heart attack. He will soon be  12 years and describes his marriage as satisfying. Pt lost about 100 lbs over the past year in hopes to help lower blood sugar levels, but he still struggles with that. He admits to not doing what he needs to do all the time to manage it. Interventions conducted today were geared towards pain symptom management, mood management, and gaining insight into how he can improve his mental health. What he is realizing that he needs to do in order to get his blood sugar under control was discussed (e.g., maybe the fear of him losing feeling in his feet or losing toes is enough to \"scare him straight\" this time). Education was also provided as to the nature of counseling and what to expect in subsequent sessions should he return. The pt was encouraged to return for additional sessions as needed/desired by him.    Mental Status Exam  Hygiene: "  good  Dress: normal  Attitude:  Cooperative  Motor Activity: normal  Speech: normal  Mood:  depressed, situationally   Affect:  flat  Thought Processes: normal  Thought Content:  normal  Suicidal Thoughts:  not endorsed  Homicidal Thoughts:  not endorsed  Crisis Safety Plan: not needed   Hallucinations:  none      Patient's Support Network Includes:  family, friends      Progress toward goal: there is evidence to suggest that he is taking measures to improve the quality of his life including seeking pain treatment, but he seems to struggle to take better care of himself in order to keep blood sugar levels under control      Functional Status: moderate      Prognosis: good    Assessment      The pt presented as depressed in some ways, though situationally due to health problems and chronic pain.     From a psychological standpoint, he is a good candidate to receive the SCS as he is motivated for the treatment, has good knowledge about it, and trusts his physician's guidance/expertise.      Plan      In order to diminish symptoms of depression and pain, he is to follow up with his pain physician and receive the SCS (this week or as soon as possible). He is to make a point to better abide by his physician's instructions regarding maintaining healthy blood sugar levels (ongoing).    Trisha Hewitt, PhD, LP

## 2018-04-27 ENCOUNTER — OFFICE VISIT (OUTPATIENT)
Dept: ORTHOPEDIC SURGERY | Facility: CLINIC | Age: 43
End: 2018-04-27

## 2018-04-27 VITALS — BODY MASS INDEX: 35.89 KG/M2 | WEIGHT: 265 LBS | RESPIRATION RATE: 18 BRPM | HEIGHT: 72 IN

## 2018-04-27 DIAGNOSIS — E11.622 TYPE 2 DIABETES MELLITUS WITH OTHER SKIN ULCER, WITH LONG-TERM CURRENT USE OF INSULIN (HCC): Primary | ICD-10-CM

## 2018-04-27 DIAGNOSIS — Z79.4 TYPE 2 DIABETES MELLITUS WITH OTHER SKIN ULCER, WITH LONG-TERM CURRENT USE OF INSULIN (HCC): Primary | ICD-10-CM

## 2018-04-27 DIAGNOSIS — L97.511 ULCER OF FOOT, CHRONIC, RIGHT, LIMITED TO BREAKDOWN OF SKIN (HCC): ICD-10-CM

## 2018-04-27 DIAGNOSIS — M20.5X2 HALLUX LIMITUS OF LEFT FOOT: ICD-10-CM

## 2018-04-27 DIAGNOSIS — E11.42 DIABETIC POLYNEUROPATHY ASSOCIATED WITH TYPE 2 DIABETES MELLITUS (HCC): ICD-10-CM

## 2018-04-27 DIAGNOSIS — L84 CALLUS OF FOOT: ICD-10-CM

## 2018-04-27 DIAGNOSIS — L97.521 ULCER OF FOOT, LEFT, LIMITED TO BREAKDOWN OF SKIN (HCC): ICD-10-CM

## 2018-04-27 PROCEDURE — 11042 DBRDMT SUBQ TIS 1ST 20SQCM/<: CPT | Performed by: PODIATRIST

## 2018-04-27 PROCEDURE — 99024 POSTOP FOLLOW-UP VISIT: CPT | Performed by: PODIATRIST

## 2018-04-27 RX ORDER — DOXYCYCLINE 50 MG/1
100 CAPSULE ORAL
Qty: 10 CAPSULE | Refills: 1 | Status: ON HOLD | OUTPATIENT
Start: 2018-04-27 | End: 2018-06-03

## 2018-04-27 NOTE — PROGRESS NOTES
Subjective   Patient ID: Wai Medeiros is a 42 y.o. male with diabetes who is status post Verma arthroplasty of the left foot approximately 3-1/2 weeks ago.  He states that foot is doing well and giving him no problems but his right foot is getting worse and he's concerned about that.  States he's concerned about the odor and his wife recommended he get back in sooner.  Denies constitutional symptoms.      History of Present Illness                                                   Review of Systems   Constitutional: Negative for diaphoresis, fever and unexpected weight change.   HENT: Negative for dental problem and sore throat.    Eyes: Negative for visual disturbance.   Respiratory: Negative for shortness of breath.    Cardiovascular: Negative for chest pain.   Gastrointestinal: Negative for abdominal pain, constipation, diarrhea, nausea and vomiting.   Genitourinary: Negative for difficulty urinating and frequency.   Skin: Positive for wound.   Neurological: Negative for headaches.   Hematological: Does not bruise/bleed easily.   All other systems reviewed and are negative.      Past Medical History:   Diagnosis Date   • Anxiety and depression    • Constipation    • Depression    • Diabetes mellitus    • Fatty liver    • GERD (gastroesophageal reflux disease)    • High cholesterol    • History of methicillin resistant staphylococcus aureus (MRSA) 2013    RIGHT THIGH   • Hypertension    • Injury of back    • Neuropathy    • Pancreatitis    • Wears glasses         Past Surgical History:   Procedure Laterality Date   • APPENDECTOMY     • BACK SURGERY      L4-5 LAMINECTOMY   • CHOLECYSTECTOMY     • FOOT ARTHROPLASTY Left 4/2/2018    Procedure: Left foot Verma arthroplasty, wound debridement and grafting;  Surgeon: Sammy Antoine DPM;  Location: [facility];  Service: Orthopedics   • FOOT IRRIGATION, DEBRIDEMENT AND REPAIR Left 1/6/2018    Procedure: Left foot irrigation and debridement, wound closure,  graft application;  Surgeon: Sammy Antoine DPM;  Location: [facility] OR;  Service:    • INCISION AND DRAINAGE FOOT Left 1/3/2018    Procedure: INCISION AND DRAINAGE FOOT ABSCESS, GREAT TOE;  Surgeon: Sammy Antoine DPM;  Location: [facility] OR;  Service:    • SCROTAL SURGERY     • WISDOM TOOTH EXTRACTION         Social History     Social History   • Marital status:      Spouse name: N/A   • Number of children: N/A   • Years of education: N/A     Occupational History   • Not on file.     Social History Main Topics   • Smoking status: Current Every Day Smoker     Packs/day: 1.00     Types: Cigarettes   • Smokeless tobacco: Current User     Types: Snuff   • Alcohol use No   • Drug use: No   • Sexual activity: Defer     Other Topics Concern   • Not on file     Social History Narrative   • No narrative on file       Ready to quit: No  Counseling given: Yes      I have reviewed all of the above social hx, family hx, surgical hx, medications, allergies & ROS and confirm that it is accurate.    No Known Allergies    Objective   Physical Exam   Constitutional: He is oriented to person, place, and time. He appears well-developed and well-nourished.   HENT:   Head: Normocephalic and atraumatic.   Nose: Nose normal.   Eyes: Conjunctivae and EOM are normal. Pupils are equal, round, and reactive to light.   Neck: Normal range of motion.   Pulmonary/Chest: Effort normal.   Neurological: He is alert and oriented to person, place, and time.   Skin: Skin is warm.   Psychiatric: He has a normal mood and affect. His behavior is normal. Judgment and thought content normal.   Nursing note and vitals reviewed.    Ortho Exam  Ortho Exam  Left foot exam shows that he's neurovascularly intact.  Protective sensation lost.  Incision healed well.  Plantar medial hallux wound is healed.  No erythema or edema.  No sign of infection.    Right foot exam shows a much larger worsening plantar medial hallux ulceration secondary to hallux  limitus.  There is a very large black hyperkeratotic area that is thick with odor underneath.  The hallux is slightly edematous and erythematous.  Erythema is blanchable.  No change to neurovascular exam.  Pulses palpable.  CFT brisk.      Assessment/Plan  diabetes, neuropathy, bilateral hallux limitus with ulcerations of the plantar medial hallux, status post Verma arthroplasty of the left foot  Independent Review of Radiographic Studies:      Laboratory and Other Studies:     Medical Decision Making:                Procedures  Debridement Note    Location of debridement: Plantar medial right hallux  Description of procedure: excisional  Type of instrument used: scalpel  Type of tissue removed: necrotic, devitalized and non-viable  Appearance and size of the wound: down to fresh bleeding tissue  Depth of debridement: subcutaneous tissue    Wound measures 2 x 2  I sharply and excisionally debrided the wounds down to healthy bleeding tissue with a good healthy granular base we will continue with the local wound care.    Wai was seen today for wound check and follow-up.    Diagnoses and all orders for this visit:    Type 2 diabetes mellitus with other skin ulcer, with long-term current use of insulin    Ulcer of foot, left, limited to breakdown of skin    Ulcer of foot, chronic, right, limited to breakdown of skin    Hallux limitus of left foot    Callus of foot    Diabetic polyneuropathy associated with type 2 diabetes mellitus    Other orders  -     doxycycline (MONODOX) 50 MG capsule; Take 2 capsules by mouth Daily.          Recommendations/Plan:  His left side has healed well and is now unremarkable.  We need to get him a pair of diabetic shoes soon however due to the worsening ulceration on the right side we will not be able to do this.  I debrided the wound and placed a Betadine wet-to-dry.  He should keep the wound covered and clean and dry as well.  Try to offload as best as possible.  He should also keep  this dry and covered.  He will be refilled his antibiotics to help prevent spread of any infection.  I discussed with him that we need to seriously consider the same procedure on this foot sooner rather than later.  He has a wedding anniversary coming up as well as elections where he is running for [employer] and states he would like to try to put this off a couple of months if possible.  I advised him that he may be okay to wait a few weeks but I would not recommend waiting months for this as this could very easily worsen and result in amputation.  I reminded him of how quickly the left foot healed and that he would not be significantly immobilized.  Our plan will be to reevaluate this in 2 weeks and then determine the next step.    Return in about 2 weeks (around 5/11/2018).  Patient agreeable to call or return sooner for any concerns.

## 2018-05-10 ENCOUNTER — OFFICE VISIT (OUTPATIENT)
Dept: ORTHOPEDIC SURGERY | Facility: CLINIC | Age: 43
End: 2018-05-10

## 2018-05-10 VITALS — HEIGHT: 72 IN | BODY MASS INDEX: 35.89 KG/M2 | RESPIRATION RATE: 18 BRPM | WEIGHT: 265 LBS

## 2018-05-10 DIAGNOSIS — E11.622 TYPE 2 DIABETES MELLITUS WITH OTHER SKIN ULCER, WITH LONG-TERM CURRENT USE OF INSULIN (HCC): ICD-10-CM

## 2018-05-10 DIAGNOSIS — L84 CALLUS OF FOOT: ICD-10-CM

## 2018-05-10 DIAGNOSIS — M86.9 OSTEOMYELITIS OF ANKLE OR FOOT: ICD-10-CM

## 2018-05-10 DIAGNOSIS — L97.521 ULCER OF FOOT, LEFT, LIMITED TO BREAKDOWN OF SKIN (HCC): ICD-10-CM

## 2018-05-10 DIAGNOSIS — E11.42 DIABETIC POLYNEUROPATHY ASSOCIATED WITH TYPE 2 DIABETES MELLITUS (HCC): Primary | ICD-10-CM

## 2018-05-10 DIAGNOSIS — L97.511 ULCER OF FOOT, CHRONIC, RIGHT, LIMITED TO BREAKDOWN OF SKIN (HCC): ICD-10-CM

## 2018-05-10 DIAGNOSIS — Z79.4 TYPE 2 DIABETES MELLITUS WITH OTHER SKIN ULCER, WITH LONG-TERM CURRENT USE OF INSULIN (HCC): ICD-10-CM

## 2018-05-10 DIAGNOSIS — M20.5X2 HALLUX LIMITUS OF LEFT FOOT: ICD-10-CM

## 2018-05-10 PROCEDURE — 99024 POSTOP FOLLOW-UP VISIT: CPT | Performed by: PODIATRIST

## 2018-05-10 PROCEDURE — 99213 OFFICE O/P EST LOW 20 MIN: CPT | Performed by: PODIATRIST

## 2018-05-10 PROCEDURE — 11042 DBRDMT SUBQ TIS 1ST 20SQCM/<: CPT | Performed by: PODIATRIST

## 2018-05-10 RX ORDER — SULFAMETHOXAZOLE AND TRIMETHOPRIM 800; 160 MG/1; MG/1
1 TABLET ORAL 2 TIMES DAILY
Qty: 20 TABLET | Refills: 1 | Status: SHIPPED | OUTPATIENT
Start: 2018-05-10 | End: 2018-06-07 | Stop reason: HOSPADM

## 2018-05-10 NOTE — PROGRESS NOTES
Subjective   Patient ID: Wai Medeiros is a 42 y.o. male he presents back today for his right great toe wound.  Also states he has a new place on the left second toe and he thinks that may be from his shoes.  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  Still does not wish to have surgery until that is over with as well as he takes his trip with his wife for their anniversary.  Tells me he ran out of his antibiotics.      History of Present Illness                                                   Review of Systems   Skin: Positive for color change and wound.       Past Medical History:   Diagnosis Date   • Anxiety and depression    • Constipation    • Depression    • Diabetes mellitus    • Fatty liver    • GERD (gastroesophageal reflux disease)    • High cholesterol    • History of methicillin resistant staphylococcus aureus (MRSA) 2013    RIGHT THIGH   • Hypertension    • Injury of back    • Neuropathy    • Pancreatitis    • Wears glasses         Past Surgical History:   Procedure Laterality Date   • APPENDECTOMY     • BACK SURGERY      L4-5 LAMINECTOMY   • CHOLECYSTECTOMY     • FOOT ARTHROPLASTY Left 4/2/2018    Procedure: Left foot Verma arthroplasty, wound debridement and grafting;  Surgeon: Sammy Antoine DPM;  Location: [facility];  Service: Orthopedics   • FOOT IRRIGATION, DEBRIDEMENT AND REPAIR Left 1/6/2018    Procedure: Left foot irrigation and debridement, wound closure, graft application;  Surgeon: Sammy Antoine DPM;  Location: [facility];  Service:    • INCISION AND DRAINAGE FOOT Left 1/3/2018    Procedure: INCISION AND DRAINAGE FOOT ABSCESS, GREAT TOE;  Surgeon: Sammy Antoine DPM;  Location: [facility];  Service:    • SCROTAL SURGERY     • WISDOM TOOTH EXTRACTION         Social History     Social History   • Marital status:      Spouse name: N/A   • Number of children: N/A   • Years of education: N/A     Occupational History   • Not on file.     Social  History Main Topics   • Smoking status: Current Every Day Smoker     Packs/day: 1.00     Types: Cigarettes   • Smokeless tobacco: Current User     Types: Snuff   • Alcohol use No   • Drug use: No   • Sexual activity: Defer     Other Topics Concern   • Not on file     Social History Narrative   • No narrative on file       Ready to quit: No  Counseling given: Yes      I have reviewed all of the above social hx, family hx, surgical hx, medications, allergies & ROS and confirm that it is accurate.    No Known Allergies    Objective   Physical Exam   Constitutional: He is oriented to person, place, and time. He appears well-developed and well-nourished.   HENT:   Head: Normocephalic and atraumatic.   Nose: Nose normal.   Eyes: Conjunctivae and EOM are normal. Pupils are equal, round, and reactive to light.   Neck: Normal range of motion.   Pulmonary/Chest: Effort normal.   Neurological: He is alert and oriented to person, place, and time.   Skin: Skin is warm.   Psychiatric: He has a normal mood and affect. His behavior is normal. Judgment and thought content normal.   Nursing note and vitals reviewed.    Ortho Exam  Ortho Exam   Ortho Exam  Left foot exam shows that he's neurovascularly intact grossly.  Protective sensation lost.  Incision healed well.  Plantar medial hallux wound is healed. There is slight warmth and erythema and edema to the entire left lower extremity.  The left lower extremity has a fluid-filled blister area on the distal tip of the second digit.  Second digits also edematous and erythematous.  There is no odor or pus or purulence.  Right foot exam shows a much larger worsening plantar medial hallux ulceration secondary to hallux limitus.  There is a very large black hyperkeratotic area that is thick with odor underneath.  The hallux is slightly edematous and erythematous.  Erythema is blanchable.  No change to neurovascular exam.  Pulses palpable.  CFT brisk.   The wound does still have slight  odor but seems to have filled in and is smaller.  It measures approximately 1.2 x 0.9.  It has very irregular shape.      Assessment/Plan  right plantar medial hallux wound secondary to hallux limitus, diabetes, new left second digit blister/pre-ulcerative area  Independent Review of Radiographic Studies:      Laboratory and Other Studies:     Medical Decision Making:        Procedures  Debridement Note    Location of debridement: Right plantar medial hallux, left second digit  Description of procedure: excisional  Type of instrument used: scalpel  Type of tissue removed: devitalized and non-viable  Appearance and size of the wound: down to fresh bleeding tissue  Depth of debridement: subcutaneous tissue    Right plantar medial hallux measures 1.2 x 0.9.  Left second digit measures 0.2 x 0.3  I sharply and excisionally debrided the wounds down to healthy bleeding tissue with a good healthy granular base we will continue with the local wound care.    Wai was seen today for wound check and follow-up.    Diagnoses and all orders for this visit:    Diabetic polyneuropathy associated with type 2 diabetes mellitus    Osteomyelitis of ankle or foot    Hallux limitus of left foot    Callus of foot    Ulcer of foot, chronic, right, limited to breakdown of skin    Ulcer of foot, left, limited to breakdown of skin    Type 2 diabetes mellitus with other skin ulcer, with long-term current use of insulin              Recommendations/Plan:  I will refill his antibiotics and he will continue these.  He will continue with local wound care to both feet.  He is wearing regular tennis shoes today and I advised him of the importance of trying to offload the right great toe to prevent this from worsening.  I explained to him again that I would try to help keep this stable for the next 2 weeks but as soon as he is finished with his election and his anniversary trip we need to plan to perform surgery.  He states he and his wife are  going to [place] for their anniversary and I advised against any excessive walking and advised him to utilize some type of assistive device.    Return in about 11 days (around 5/21/2018).  Patient agreeable to call or return sooner for any concerns.

## 2018-06-03 ENCOUNTER — HOSPITAL ENCOUNTER (INPATIENT)
Facility: HOSPITAL | Age: 43
LOS: 4 days | Discharge: HOME HEALTH | End: 2018-06-07
Attending: EMERGENCY MEDICINE | Admitting: INTERNAL MEDICINE

## 2018-06-03 ENCOUNTER — APPOINTMENT (OUTPATIENT)
Dept: GENERAL RADIOLOGY | Facility: HOSPITAL | Age: 43
End: 2018-06-03

## 2018-06-03 DIAGNOSIS — E11.621 DIABETIC ULCER OF TOE OF LEFT FOOT ASSOCIATED WITH TYPE 2 DIABETES MELLITUS, UNSPECIFIED ULCER STAGE (HCC): ICD-10-CM

## 2018-06-03 DIAGNOSIS — M86.271 SUBACUTE OSTEOMYELITIS OF RIGHT FOOT (HCC): Primary | ICD-10-CM

## 2018-06-03 DIAGNOSIS — M86.171 OSTEOMYELITIS OF ANKLE OR FOOT, ACUTE, RIGHT (HCC): ICD-10-CM

## 2018-06-03 DIAGNOSIS — L97.529 DIABETIC ULCER OF TOE OF LEFT FOOT ASSOCIATED WITH TYPE 2 DIABETES MELLITUS, UNSPECIFIED ULCER STAGE (HCC): ICD-10-CM

## 2018-06-03 PROBLEM — F32.A ANXIETY AND DEPRESSION: Status: ACTIVE | Noted: 2018-06-03

## 2018-06-03 PROBLEM — G62.9 NEUROPATHY: Status: ACTIVE | Noted: 2018-06-03

## 2018-06-03 PROBLEM — I10 HYPERTENSION: Status: ACTIVE | Noted: 2018-06-03

## 2018-06-03 PROBLEM — F41.9 ANXIETY AND DEPRESSION: Status: ACTIVE | Noted: 2018-06-03

## 2018-06-03 PROBLEM — E78.00 HIGH CHOLESTEROL: Status: ACTIVE | Noted: 2018-06-03

## 2018-06-03 LAB
ALBUMIN SERPL-MCNC: 4.3 G/DL (ref 3.5–5)
ALBUMIN/GLOB SERPL: 1.4 G/DL (ref 1–2)
ALP SERPL-CCNC: 96 U/L (ref 38–126)
ALT SERPL W P-5'-P-CCNC: 35 U/L (ref 13–69)
ANION GAP SERPL CALCULATED.3IONS-SCNC: 16.2 MMOL/L (ref 10–20)
AST SERPL-CCNC: 22 U/L (ref 15–46)
BASOPHILS # BLD AUTO: 0.07 10*3/MM3 (ref 0–0.2)
BASOPHILS NFR BLD AUTO: 0.4 % (ref 0–2.5)
BILIRUB SERPL-MCNC: 0.4 MG/DL (ref 0.2–1.3)
BUN BLD-MCNC: 12 MG/DL (ref 7–20)
BUN/CREAT SERPL: 20 (ref 6.3–21.9)
CALCIUM SPEC-SCNC: 9.1 MG/DL (ref 8.4–10.2)
CHLORIDE SERPL-SCNC: 101 MMOL/L (ref 98–107)
CO2 SERPL-SCNC: 26 MMOL/L (ref 26–30)
CREAT BLD-MCNC: 0.6 MG/DL (ref 0.6–1.3)
CRP SERPL-MCNC: 6.4 MG/DL (ref 0–1)
D-LACTATE SERPL-SCNC: 1.4 MMOL/L (ref 0.5–2)
DEPRECATED RDW RBC AUTO: 40.4 FL (ref 37–54)
EOSINOPHIL # BLD AUTO: 0.39 10*3/MM3 (ref 0–0.7)
EOSINOPHIL NFR BLD AUTO: 2.3 % (ref 0–7)
ERYTHROCYTE [DISTWIDTH] IN BLOOD BY AUTOMATED COUNT: 12.3 % (ref 11.5–14.5)
ERYTHROCYTE [SEDIMENTATION RATE] IN BLOOD: 13 MM/HR (ref 0–15)
GFR SERPL CREATININE-BSD FRML MDRD: 149 ML/MIN/1.73
GLOBULIN UR ELPH-MCNC: 3.1 GM/DL
GLUCOSE BLD-MCNC: 136 MG/DL (ref 74–98)
GLUCOSE BLDC GLUCOMTR-MCNC: 219 MG/DL (ref 70–130)
HCT VFR BLD AUTO: 49.3 % (ref 42–52)
HGB BLD-MCNC: 17.2 G/DL (ref 14–18)
IMM GRANULOCYTES # BLD: 0.06 10*3/MM3 (ref 0–0.06)
IMM GRANULOCYTES NFR BLD: 0.4 % (ref 0–0.6)
LYMPHOCYTES # BLD AUTO: 2.84 10*3/MM3 (ref 0.6–3.4)
LYMPHOCYTES NFR BLD AUTO: 17 % (ref 10–50)
MCH RBC QN AUTO: 31.2 PG (ref 27–31)
MCHC RBC AUTO-ENTMCNC: 34.9 G/DL (ref 30–37)
MCV RBC AUTO: 89.3 FL (ref 80–94)
MONOCYTES # BLD AUTO: 1.03 10*3/MM3 (ref 0–0.9)
MONOCYTES NFR BLD AUTO: 6.2 % (ref 0–12)
NEUTROPHILS # BLD AUTO: 12.31 10*3/MM3 (ref 2–6.9)
NEUTROPHILS NFR BLD AUTO: 73.7 % (ref 37–80)
NRBC BLD MANUAL-RTO: 0 /100 WBC (ref 0–0)
PLATELET # BLD AUTO: 230 10*3/MM3 (ref 130–400)
PMV BLD AUTO: 9.7 FL (ref 6–12)
POTASSIUM BLD-SCNC: 4.2 MMOL/L (ref 3.5–5.1)
PROT SERPL-MCNC: 7.4 G/DL (ref 6.3–8.2)
RBC # BLD AUTO: 5.52 10*6/MM3 (ref 4.7–6.1)
SODIUM BLD-SCNC: 139 MMOL/L (ref 137–145)
WBC NRBC COR # BLD: 16.7 10*3/MM3 (ref 4.8–10.8)

## 2018-06-03 PROCEDURE — 25010000002 VANCOMYCIN PER 500 MG: Performed by: INTERNAL MEDICINE

## 2018-06-03 PROCEDURE — 82962 GLUCOSE BLOOD TEST: CPT

## 2018-06-03 PROCEDURE — 87070 CULTURE OTHR SPECIMN AEROBIC: CPT | Performed by: NURSE PRACTITIONER

## 2018-06-03 PROCEDURE — 83605 ASSAY OF LACTIC ACID: CPT | Performed by: NURSE PRACTITIONER

## 2018-06-03 PROCEDURE — 80053 COMPREHEN METABOLIC PANEL: CPT | Performed by: NURSE PRACTITIONER

## 2018-06-03 PROCEDURE — 85651 RBC SED RATE NONAUTOMATED: CPT | Performed by: NURSE PRACTITIONER

## 2018-06-03 PROCEDURE — 99283 EMERGENCY DEPT VISIT LOW MDM: CPT

## 2018-06-03 PROCEDURE — 87077 CULTURE AEROBIC IDENTIFY: CPT | Performed by: NURSE PRACTITIONER

## 2018-06-03 PROCEDURE — 25010000002 PIPERACILLIN SOD-TAZOBACTAM PER 1 G: Performed by: NURSE PRACTITIONER

## 2018-06-03 PROCEDURE — 73630 X-RAY EXAM OF FOOT: CPT

## 2018-06-03 PROCEDURE — 63710000001 INSULIN ASPART PER 5 UNITS: Performed by: INTERNAL MEDICINE

## 2018-06-03 PROCEDURE — 87186 SC STD MICRODIL/AGAR DIL: CPT | Performed by: NURSE PRACTITIONER

## 2018-06-03 PROCEDURE — 85025 COMPLETE CBC W/AUTO DIFF WBC: CPT | Performed by: NURSE PRACTITIONER

## 2018-06-03 PROCEDURE — 25010000002 MORPHINE PER 10 MG: Performed by: NURSE PRACTITIONER

## 2018-06-03 PROCEDURE — 87040 BLOOD CULTURE FOR BACTERIA: CPT | Performed by: NURSE PRACTITIONER

## 2018-06-03 PROCEDURE — 86140 C-REACTIVE PROTEIN: CPT | Performed by: NURSE PRACTITIONER

## 2018-06-03 PROCEDURE — 87147 CULTURE TYPE IMMUNOLOGIC: CPT | Performed by: NURSE PRACTITIONER

## 2018-06-03 RX ORDER — LISINOPRIL 20 MG/1
40 TABLET ORAL DAILY
Status: DISCONTINUED | OUTPATIENT
Start: 2018-06-04 | End: 2018-06-07 | Stop reason: HOSPADM

## 2018-06-03 RX ORDER — OXYCODONE AND ACETAMINOPHEN 10; 325 MG/1; MG/1
1 TABLET ORAL EVERY 6 HOURS PRN
Status: DISCONTINUED | OUTPATIENT
Start: 2018-06-03 | End: 2018-06-07 | Stop reason: HOSPADM

## 2018-06-03 RX ORDER — DULOXETIN HYDROCHLORIDE 30 MG/1
60 CAPSULE, DELAYED RELEASE ORAL DAILY
Status: DISCONTINUED | OUTPATIENT
Start: 2018-06-04 | End: 2018-06-07 | Stop reason: HOSPADM

## 2018-06-03 RX ORDER — NICOTINE POLACRILEX 4 MG
1 LOZENGE BUCCAL
Status: DISCONTINUED | OUTPATIENT
Start: 2018-06-03 | End: 2018-06-07 | Stop reason: HOSPADM

## 2018-06-03 RX ORDER — MORPHINE SULFATE 2 MG/ML
4 INJECTION, SOLUTION INTRAMUSCULAR; INTRAVENOUS ONCE
Status: COMPLETED | OUTPATIENT
Start: 2018-06-03 | End: 2018-06-03

## 2018-06-03 RX ORDER — OXYCODONE HCL 10 MG/1
10 TABLET, FILM COATED, EXTENDED RELEASE ORAL EVERY 8 HOURS SCHEDULED
Status: DISCONTINUED | OUTPATIENT
Start: 2018-06-03 | End: 2018-06-07 | Stop reason: HOSPADM

## 2018-06-03 RX ORDER — ACETAMINOPHEN 325 MG/1
650 TABLET ORAL EVERY 4 HOURS PRN
Status: DISCONTINUED | OUTPATIENT
Start: 2018-06-03 | End: 2018-06-07 | Stop reason: HOSPADM

## 2018-06-03 RX ORDER — BUPROPION HYDROCHLORIDE 150 MG/1
150 TABLET ORAL EVERY MORNING
Status: DISCONTINUED | OUTPATIENT
Start: 2018-06-04 | End: 2018-06-07 | Stop reason: HOSPADM

## 2018-06-03 RX ORDER — PREGABALIN 75 MG/1
150 CAPSULE ORAL 2 TIMES DAILY
Status: DISCONTINUED | OUTPATIENT
Start: 2018-06-03 | End: 2018-06-07 | Stop reason: HOSPADM

## 2018-06-03 RX ORDER — OXYCODONE HYDROCHLORIDE 15 MG/1
15 TABLET, FILM COATED, EXTENDED RELEASE ORAL EVERY 12 HOURS SCHEDULED
Status: DISCONTINUED | OUTPATIENT
Start: 2018-06-03 | End: 2018-06-03

## 2018-06-03 RX ORDER — SODIUM CHLORIDE 9 MG/ML
100 INJECTION, SOLUTION INTRAVENOUS CONTINUOUS
Status: DISCONTINUED | OUTPATIENT
Start: 2018-06-03 | End: 2018-06-07 | Stop reason: HOSPADM

## 2018-06-03 RX ORDER — SODIUM CHLORIDE 0.9 % (FLUSH) 0.9 %
10 SYRINGE (ML) INJECTION AS NEEDED
Status: DISCONTINUED | OUTPATIENT
Start: 2018-06-03 | End: 2018-06-07 | Stop reason: HOSPADM

## 2018-06-03 RX ORDER — DEXTROSE MONOHYDRATE 25 G/50ML
25 INJECTION, SOLUTION INTRAVENOUS
Status: DISCONTINUED | OUTPATIENT
Start: 2018-06-03 | End: 2018-06-07 | Stop reason: HOSPADM

## 2018-06-03 RX ORDER — ONDANSETRON 4 MG/1
4 TABLET, ORALLY DISINTEGRATING ORAL EVERY 8 HOURS PRN
Status: DISCONTINUED | OUTPATIENT
Start: 2018-06-03 | End: 2018-06-07 | Stop reason: HOSPADM

## 2018-06-03 RX ORDER — GLIPIZIDE 5 MG/1
10 TABLET ORAL
Status: DISCONTINUED | OUTPATIENT
Start: 2018-06-04 | End: 2018-06-07 | Stop reason: HOSPADM

## 2018-06-03 RX ADMIN — TAZOBACTAM SODIUM AND PIPERACILLIN SODIUM 4.5 G: 500; 4 INJECTION, SOLUTION INTRAVENOUS at 19:39

## 2018-06-03 RX ADMIN — VANCOMYCIN HYDROCHLORIDE 2000 MG: 500 INJECTION, POWDER, LYOPHILIZED, FOR SOLUTION INTRAVENOUS at 21:36

## 2018-06-03 RX ADMIN — METFORMIN HYDROCHLORIDE 1000 MG: 500 TABLET, FILM COATED ORAL at 21:36

## 2018-06-03 RX ADMIN — SODIUM CHLORIDE 1000 ML: 9 INJECTION, SOLUTION INTRAVENOUS at 19:39

## 2018-06-03 RX ADMIN — INSULIN ASPART 4 UNITS: 100 INJECTION, SOLUTION INTRAVENOUS; SUBCUTANEOUS at 21:36

## 2018-06-03 RX ADMIN — PREGABALIN 150 MG: 75 CAPSULE ORAL at 21:36

## 2018-06-03 RX ADMIN — OXYCODONE HYDROCHLORIDE 10 MG: 10 TABLET, FILM COATED, EXTENDED RELEASE ORAL at 21:56

## 2018-06-03 RX ADMIN — SODIUM CHLORIDE 100 ML/HR: 9 INJECTION, SOLUTION INTRAVENOUS at 21:35

## 2018-06-03 RX ADMIN — MORPHINE SULFATE 4 MG: 2 INJECTION, SOLUTION INTRAMUSCULAR; INTRAVENOUS at 19:56

## 2018-06-03 NOTE — ED PROVIDER NOTES
Subjective   History of Present Illness  This is a 42 year old male who comes in today complaining of left toe pain with increase drainage and fever. He reports seeing Dr. Antoine who removed a callous 3 weeks ago. Since that time he has returned and placed on antibiotics two times. He had a follow up appointment on 5/21 but was called by the office and cancelled. He has not been able to follow up since that time because Dr. Antoine has been out of the office. Yesterday he noticed an increase in drainage and redness. Today he started running a temperature of up to 100.9  Review of Systems   Constitutional: Negative.    HENT: Negative.    Eyes: Negative.    Respiratory: Negative.    Cardiovascular: Negative.    Gastrointestinal: Negative.    Endocrine: Negative.    Genitourinary: Negative.    Musculoskeletal:        Right great toe pain with wound   Skin: Positive for wound.   Allergic/Immunologic: Negative.    Neurological: Negative.    All other systems reviewed and are negative.      Past Medical History:   Diagnosis Date   • Anxiety and depression    • Constipation    • Depression    • Diabetes mellitus    • Fatty liver    • GERD (gastroesophageal reflux disease)    • High cholesterol    • History of methicillin resistant staphylococcus aureus (MRSA) 2013    RIGHT THIGH   • Hypertension    • Injury of back    • Neuropathy    • Pancreatitis    • Wears glasses        No Known Allergies    Past Surgical History:   Procedure Laterality Date   • APPENDECTOMY     • BACK SURGERY      L4-5 LAMINECTOMY   • CHOLECYSTECTOMY     • FOOT ARTHROPLASTY Left 4/2/2018    Procedure: Left foot Verma arthroplasty, wound debridement and grafting;  Surgeon: Sammy Antoine DPM;  Location: [facility];  Service: Orthopedics   • FOOT IRRIGATION, DEBRIDEMENT AND REPAIR Left 1/6/2018    Procedure: Left foot irrigation and debridement, wound closure, graft application;  Surgeon: Sammy Antoine DPM;  Location: [facility];  Service:    • INCISION  AND DRAINAGE FOOT Left 1/3/2018    Procedure: INCISION AND DRAINAGE FOOT ABSCESS, GREAT TOE;  Surgeon: Sammy Antoine DPM;  Location: [facility] OR;  Service:    • SCROTAL SURGERY     • WISDOM TOOTH EXTRACTION         Family History   Problem Relation Age of Onset   • Diabetes Mother    • COPD Mother    • Diabetes Father        Social History     Social History   • Marital status:      Social History Main Topics   • Smoking status: Current Every Day Smoker     Packs/day: 1.00     Types: Cigarettes   • Smokeless tobacco: Current User     Types: Snuff   • Alcohol use No   • Drug use: No   • Sexual activity: Defer     Other Topics Concern   • Not on file           Objective   Physical Exam   Constitutional: He appears well-developed and well-nourished.   Nursing note and vitals reviewed.  GEN: No acute distress  Head: Normocephalic, atraumatic  Eyes: Pupils equal round reactive to light  ENT: Posterior pharynx normal in appearance, oral mucosa is moist  Chest: Nontender to palpation  Cardiovascular: Regular rate  Lungs: Clear to auscultation bilaterally  Abdomen: Soft, nontender, nondistended, no peritoneal signs  Extremities: right great toe red from top of toe to the TM joint. Tender at the MT joint.   Wound open with thick white/yellow drainage with odor.   Center dark brown with pale surrounding tissue. Surrounding skin pale white non-blanching.   Neuro: GCS 15  Psych: Mood and affect are appropriate      Procedures           ED Course  ED Course as of Jun 05 1651   Sun Jun 03, 2018   1938 Complain of great toe pain. Morphine ordered.   [TW]      ED Course User Index  [TW] NEHEMIAH Villa                  MDM  Number of Diagnoses or Management Options  Diagnosis management comments: After viewing Dr. Antoine's note he has osteomyelitis since he has developed a fever we will get a cbc, cmp, lactic acid, blood cultures ESR and CRP and get an xray.        Amount and/or Complexity of Data Reviewed  Clinical  lab tests: reviewed and ordered  Tests in the radiology section of CPT®: ordered and reviewed  Review and summarize past medical records: yes  Discuss the patient with other providers: yes  Independent visualization of images, tracings, or specimens: yes    Risk of Complications, Morbidity, and/or Mortality  Presenting problems: high  Diagnostic procedures: moderate  Management options: high          Final diagnoses:   Subacute osteomyelitis of right foot            NEHEMIAH Villa  06/03/18 1921       NEHEMIAH Villa  06/05/18 1651

## 2018-06-03 NOTE — ED NOTES
House Supervisor called about bed placement for admit. Patient will be going to room 431. MARIE Bowman notified.     Sara Garcia  06/03/18 1922

## 2018-06-04 ENCOUNTER — APPOINTMENT (OUTPATIENT)
Dept: ULTRASOUND IMAGING | Facility: HOSPITAL | Age: 43
End: 2018-06-04

## 2018-06-04 ENCOUNTER — APPOINTMENT (OUTPATIENT)
Dept: MRI IMAGING | Facility: HOSPITAL | Age: 43
End: 2018-06-04

## 2018-06-04 ENCOUNTER — APPOINTMENT (OUTPATIENT)
Dept: NUCLEAR MEDICINE | Facility: HOSPITAL | Age: 43
End: 2018-06-04

## 2018-06-04 LAB
ALBUMIN SERPL-MCNC: 3.6 G/DL (ref 3.5–5)
ALBUMIN/GLOB SERPL: 1.2 G/DL (ref 1–2)
ALP SERPL-CCNC: 81 U/L (ref 38–126)
ALT SERPL W P-5'-P-CCNC: 36 U/L (ref 13–69)
ANION GAP SERPL CALCULATED.3IONS-SCNC: 13.6 MMOL/L (ref 10–20)
AST SERPL-CCNC: 25 U/L (ref 15–46)
BASOPHILS # BLD AUTO: 0.08 10*3/MM3 (ref 0–0.2)
BASOPHILS NFR BLD AUTO: 0.5 % (ref 0–2.5)
BILIRUB SERPL-MCNC: 0.4 MG/DL (ref 0.2–1.3)
BUN BLD-MCNC: 13 MG/DL (ref 7–20)
BUN/CREAT SERPL: 18.6 (ref 6.3–21.9)
CALCIUM SPEC-SCNC: 8.9 MG/DL (ref 8.4–10.2)
CHLORIDE SERPL-SCNC: 106 MMOL/L (ref 98–107)
CO2 SERPL-SCNC: 27 MMOL/L (ref 26–30)
CREAT BLD-MCNC: 0.7 MG/DL (ref 0.6–1.3)
DEPRECATED RDW RBC AUTO: 41.1 FL (ref 37–54)
EOSINOPHIL # BLD AUTO: 0.44 10*3/MM3 (ref 0–0.7)
EOSINOPHIL NFR BLD AUTO: 2.8 % (ref 0–7)
ERYTHROCYTE [DISTWIDTH] IN BLOOD BY AUTOMATED COUNT: 12.4 % (ref 11.5–14.5)
GFR SERPL CREATININE-BSD FRML MDRD: 125 ML/MIN/1.73
GLOBULIN UR ELPH-MCNC: 3.1 GM/DL
GLUCOSE BLD-MCNC: 179 MG/DL (ref 74–98)
GLUCOSE BLDC GLUCOMTR-MCNC: 137 MG/DL (ref 70–130)
GLUCOSE BLDC GLUCOMTR-MCNC: 179 MG/DL (ref 70–130)
GLUCOSE BLDC GLUCOMTR-MCNC: 195 MG/DL (ref 70–130)
GLUCOSE BLDC GLUCOMTR-MCNC: 219 MG/DL (ref 70–130)
HCT VFR BLD AUTO: 47.3 % (ref 42–52)
HGB BLD-MCNC: 16.1 G/DL (ref 14–18)
IMM GRANULOCYTES # BLD: 0.06 10*3/MM3 (ref 0–0.06)
IMM GRANULOCYTES NFR BLD: 0.4 % (ref 0–0.6)
LYMPHOCYTES # BLD AUTO: 3.32 10*3/MM3 (ref 0.6–3.4)
LYMPHOCYTES NFR BLD AUTO: 21.1 % (ref 10–50)
MCH RBC QN AUTO: 31 PG (ref 27–31)
MCHC RBC AUTO-ENTMCNC: 34 G/DL (ref 30–37)
MCV RBC AUTO: 91 FL (ref 80–94)
MONOCYTES # BLD AUTO: 1.17 10*3/MM3 (ref 0–0.9)
MONOCYTES NFR BLD AUTO: 7.5 % (ref 0–12)
NEUTROPHILS # BLD AUTO: 10.63 10*3/MM3 (ref 2–6.9)
NEUTROPHILS NFR BLD AUTO: 67.7 % (ref 37–80)
NRBC BLD MANUAL-RTO: 0 /100 WBC (ref 0–0)
PLATELET # BLD AUTO: 227 10*3/MM3 (ref 130–400)
PMV BLD AUTO: 10.3 FL (ref 6–12)
POTASSIUM BLD-SCNC: 4.6 MMOL/L (ref 3.5–5.1)
PROT SERPL-MCNC: 6.7 G/DL (ref 6.3–8.2)
RBC # BLD AUTO: 5.2 10*6/MM3 (ref 4.7–6.1)
SODIUM BLD-SCNC: 142 MMOL/L (ref 137–145)
WBC NRBC COR # BLD: 15.7 10*3/MM3 (ref 4.8–10.8)

## 2018-06-04 PROCEDURE — A9503 TC99M MEDRONATE: HCPCS | Performed by: INTERNAL MEDICINE

## 2018-06-04 PROCEDURE — 0 TECHNETIUM MEDRONATE KIT: Performed by: INTERNAL MEDICINE

## 2018-06-04 PROCEDURE — 78315 BONE IMAGING 3 PHASE: CPT

## 2018-06-04 PROCEDURE — 85025 COMPLETE CBC W/AUTO DIFF WBC: CPT | Performed by: INTERNAL MEDICINE

## 2018-06-04 PROCEDURE — 63710000001 INSULIN ASPART PER 5 UNITS: Performed by: INTERNAL MEDICINE

## 2018-06-04 PROCEDURE — 80053 COMPREHEN METABOLIC PANEL: CPT | Performed by: INTERNAL MEDICINE

## 2018-06-04 PROCEDURE — 99221 1ST HOSP IP/OBS SF/LOW 40: CPT | Performed by: PODIATRIST

## 2018-06-04 PROCEDURE — 0 GADOBENATE DIMEGLUMINE 529 MG/ML SOLUTION: Performed by: INTERNAL MEDICINE

## 2018-06-04 PROCEDURE — 82962 GLUCOSE BLOOD TEST: CPT

## 2018-06-04 PROCEDURE — 73720 MRI LWR EXTREMITY W/O&W/DYE: CPT

## 2018-06-04 PROCEDURE — A9577 INJ MULTIHANCE: HCPCS | Performed by: INTERNAL MEDICINE

## 2018-06-04 PROCEDURE — 25010000002 PIPERACILLIN SOD-TAZOBACTAM PER 1 G: Performed by: INTERNAL MEDICINE

## 2018-06-04 PROCEDURE — 93926 LOWER EXTREMITY STUDY: CPT

## 2018-06-04 PROCEDURE — 25010000002 VANCOMYCIN PER 500 MG: Performed by: INTERNAL MEDICINE

## 2018-06-04 RX ORDER — IBUPROFEN 200 MG
200 TABLET ORAL EVERY 6 HOURS PRN
COMMUNITY
End: 2019-04-25

## 2018-06-04 RX ORDER — OXYCODONE HCL 10 MG/1
10 TABLET, FILM COATED, EXTENDED RELEASE ORAL EVERY 12 HOURS
COMMUNITY
Start: 2018-04-30 | End: 2018-06-07 | Stop reason: HOSPADM

## 2018-06-04 RX ORDER — TC 99M MEDRONATE 20 MG/10ML
26.9 INJECTION, POWDER, LYOPHILIZED, FOR SOLUTION INTRAVENOUS
Status: COMPLETED | OUTPATIENT
Start: 2018-06-04 | End: 2018-06-04

## 2018-06-04 RX ADMIN — INSULIN ASPART 4 UNITS: 100 INJECTION, SOLUTION INTRAVENOUS; SUBCUTANEOUS at 21:03

## 2018-06-04 RX ADMIN — VANCOMYCIN HYDROCHLORIDE 2000 MG: 500 INJECTION, POWDER, LYOPHILIZED, FOR SOLUTION INTRAVENOUS at 21:01

## 2018-06-04 RX ADMIN — GADOBENATE DIMEGLUMINE 15 ML: 529 INJECTION, SOLUTION INTRAVENOUS at 19:30

## 2018-06-04 RX ADMIN — LISINOPRIL 40 MG: 20 TABLET ORAL at 09:24

## 2018-06-04 RX ADMIN — PREGABALIN 150 MG: 75 CAPSULE ORAL at 09:24

## 2018-06-04 RX ADMIN — METFORMIN HYDROCHLORIDE 1000 MG: 500 TABLET, FILM COATED ORAL at 09:24

## 2018-06-04 RX ADMIN — GADOBENATE DIMEGLUMINE 7 ML: 529 INJECTION, SOLUTION INTRAVENOUS at 19:30

## 2018-06-04 RX ADMIN — TAZOBACTAM SODIUM AND PIPERACILLIN SODIUM 4.5 G: 500; 4 INJECTION, SOLUTION INTRAVENOUS at 02:02

## 2018-06-04 RX ADMIN — GLIPIZIDE 10 MG: 5 TABLET ORAL at 06:31

## 2018-06-04 RX ADMIN — INSULIN ASPART 2 UNITS: 100 INJECTION, SOLUTION INTRAVENOUS; SUBCUTANEOUS at 16:51

## 2018-06-04 RX ADMIN — OXYCODONE HYDROCHLORIDE 10 MG: 10 TABLET, FILM COATED, EXTENDED RELEASE ORAL at 06:31

## 2018-06-04 RX ADMIN — DULOXETINE HYDROCHLORIDE 60 MG: 30 CAPSULE, DELAYED RELEASE ORAL at 09:24

## 2018-06-04 RX ADMIN — MAGNESIUM HYDROXIDE 10 ML: 2400 SUSPENSION ORAL at 21:01

## 2018-06-04 RX ADMIN — VANCOMYCIN HYDROCHLORIDE 2000 MG: 500 INJECTION, POWDER, LYOPHILIZED, FOR SOLUTION INTRAVENOUS at 09:30

## 2018-06-04 RX ADMIN — INSULIN ASPART 2 UNITS: 100 INJECTION, SOLUTION INTRAVENOUS; SUBCUTANEOUS at 06:31

## 2018-06-04 RX ADMIN — TAZOBACTAM SODIUM AND PIPERACILLIN SODIUM 4.5 G: 500; 4 INJECTION, SOLUTION INTRAVENOUS at 09:24

## 2018-06-04 RX ADMIN — OXYCODONE HYDROCHLORIDE AND ACETAMINOPHEN 1 TABLET: 10; 325 TABLET ORAL at 18:20

## 2018-06-04 RX ADMIN — OXYCODONE HYDROCHLORIDE 10 MG: 10 TABLET, FILM COATED, EXTENDED RELEASE ORAL at 14:38

## 2018-06-04 RX ADMIN — OXYCODONE HYDROCHLORIDE AND ACETAMINOPHEN 1 TABLET: 10; 325 TABLET ORAL at 09:24

## 2018-06-04 RX ADMIN — TAZOBACTAM SODIUM AND PIPERACILLIN SODIUM 3.38 G: 375; 3 INJECTION, SOLUTION INTRAVENOUS at 16:55

## 2018-06-04 RX ADMIN — PREGABALIN 150 MG: 75 CAPSULE ORAL at 21:00

## 2018-06-04 RX ADMIN — TC 99M MEDRONATE 26.9 MILLICURIE: 20 INJECTION, POWDER, LYOPHILIZED, FOR SOLUTION INTRAVENOUS at 09:50

## 2018-06-04 RX ADMIN — OXYCODONE HYDROCHLORIDE 10 MG: 10 TABLET, FILM COATED, EXTENDED RELEASE ORAL at 22:01

## 2018-06-04 NOTE — H&P
[facility]   HISTORY AND PHYSICAL      Name:  Wai Medeiros   Age:  42 y.o.  Sex:  male  :  1975  MRN:  [id number]   Visit Number:  [id number]  Admission Date:  6/3/2018  Date Of Service:  18  Primary Care Physician:  Lamine Loyd MD, MD     Admitting diagnosis:    Principal Problem:    Subacute osteomyelitis of right foot  Active Problems:    Diabetic ulcer of toe of left foot associated with type 2 diabetes mellitus    Cellulitis of left lower extremity    Neuropathy    Hypertension    High cholesterol    Anxiety and depression        History Of Presenting Illness:      42-year-old patient with a history of her right foot chronic ulcer who is diabetic nonhealing being followed by Dr. Antoine for more than a year comes to the ER because of worsening pain and discharge and infection of the right foot.  The he his pain has gotten worse over the last week.  He has had Augmentin and Bactrim over the last few weeks and has failed as outpatient.  Workup done shows there is most probably osteomyelitis suggested the on the x-ray and patient is being admitted because of worsening cellulitis.  He's also had some fever.  Her sugars have been very high also and difficult to control it with her on the current outpatient management.  Patient has been having trouble with walking 2.  His white count has been 16,000 and there he got the IV Rocephin and vancomycin has been ordered and is being further admitted for management of his cellulitis as well as osteomyelitis of the right toe which has failed outpatient management.    Review Of Systems:     The following systems were reviewed and negative;  constitution, eyes, ENT, respiratory, cardiovascular, gastrointestinal, genitourinary, musculoskeletal, neurological and behavioral/psych,  Skin except as above.     Past Medical History:    Past Medical History:   Diagnosis Date   • Anxiety and depression    • Constipation    •  Depression    • Diabetes mellitus    • Fatty liver    • GERD (gastroesophageal reflux disease)    • High cholesterol    • History of methicillin resistant staphylococcus aureus (MRSA) 2013    RIGHT THIGH   • Hypertension    • Injury of back    • Neuropathy    • Pancreatitis    • Wears glasses        Past Surgical history:    Past Surgical History:   Procedure Laterality Date   • APPENDECTOMY     • BACK SURGERY      L4-5 LAMINECTOMY   • CHOLECYSTECTOMY     • FOOT ARTHROPLASTY Left 4/2/2018    Procedure: Left foot Verma arthroplasty, wound debridement and grafting;  Surgeon: Sammy Antoine DPM;  Location: [facility];  Service: Orthopedics   • FOOT IRRIGATION, DEBRIDEMENT AND REPAIR Left 1/6/2018    Procedure: Left foot irrigation and debridement, wound closure, graft application;  Surgeon: Sammy Antoine DPM;  Location: [facility];  Service:    • INCISION AND DRAINAGE FOOT Left 1/3/2018    Procedure: INCISION AND DRAINAGE FOOT ABSCESS, GREAT TOE;  Surgeon: Sammy Antoine DPM;  Location: [facility];  Service:    • SCROTAL SURGERY     • WISDOM TOOTH EXTRACTION         Social History:    Social History     Social History   • Marital status:      Social History Main Topics   • Smoking status: Current Every Day Smoker     Packs/day: 1.00     Types: Cigarettes   • Smokeless tobacco: Current User     Types: Snuff   • Alcohol use No   • Drug use: No   • Sexual activity: Defer     Other Topics Concern   • Not on file       Family History:    Family History   Problem Relation Age of Onset   • Diabetes Mother    • COPD Mother    • Diabetes Father          Allergies:      Patient has no known allergies.    Home Medications:    Prior to Admission Medications     Prescriptions Last Dose Informant Patient Reported? Taking?    acetaminophen (TYLENOL 8 HOUR) 650 MG 8 hr tablet   No No    Take 1 tablet by mouth Every 8 (Eight) Hours As Needed for Mild Pain .    buPROPion XL (WELLBUTRIN XL) 150 MG 24 hr tablet   Yes No     Take 150 mg by mouth Every Morning.    Canagliflozin (INVOKANA) 300 MG tablet   Yes No    Take 300 mg by mouth Daily.    DULoxetine (CYMBALTA) 60 MG capsule   Yes No    Take 60 mg by mouth Daily.    furosemide (LASIX) 40 MG tablet   Yes No    Take 40 mg by mouth Daily.    glimepiride (AMARYL) 4 MG tablet   Yes No    Take 8 mg by mouth Every Morning Before Breakfast.    HYDROcodone-acetaminophen (NORCO) 5-325 MG per tablet   No No    Take 1-2 tablets by mouth Every 4 (Four) Hours As Needed for pain.    lisinopril (PRINIVIL,ZESTRIL) 40 MG tablet   Yes No    Take 40 mg by mouth Daily.    LYRICA 150 MG capsule   Yes No    TAKE 1 CAPSULE TWICE DAILY    metFORMIN (GLUCOPHAGE) 1000 MG tablet   Yes No    Take 1,000 mg by mouth 2 (Two) Times a Day.    NOVOLOG FLEXPEN 100 UNIT/ML solution pen-injector sc pen   Yes No    ondansetron ODT (ZOFRAN-ODT) 4 MG disintegrating tablet   No No    Take 1 tablet by mouth Every 8 (Eight) Hours As Needed for Nausea or Vomiting.    oxyCODONE-acetaminophen (PERCOCET)  MG per tablet   Yes No    Take 1 tablet by mouth Every 6 (Six) Hours As Needed for Moderate Pain .    oxyCODONE-acetaminophen (PERCOCET) 5-325 MG per tablet   Yes No    TAKE 1 TABLET DAILY    OXYCONTIN 15 MG tablet extended-release 12 hour   Yes No    simvastatin (ZOCOR) 40 MG tablet   Yes No    Take 40 mg by mouth every night at bedtime.    sulfamethoxazole-trimethoprim (BACTRIM DS) 800-160 MG per tablet   No No    Take 1 tablet by mouth 2 (Two) Times a Day.    TOUJEO SOLOSTAR 300 UNIT/ML solution pen-injector   Yes No    INJECT 10 UNITS DAILY AT SAME TIME DAILY    traMADol (ULTRAM) 50 MG tablet   Yes No    TAKE 1 TABLET THREE TIMES DAILY AS NEEDED                 Vital Signs:    Temp:  [98.9 °F (37.2 °C)] 98.9 °F (37.2 °C)  Heart Rate:  [111] 111  Resp:  [18] 18  BP: (129)/(80) 129/80    1    06/03/18  1756   Weight: 116 kg (256 lb 9.6 oz)       Body mass index is 34.8 kg/m².    Physical Exam:      General Appearance:     Alert and cooperative,  And lying comfortably in bed   Head:    Atraumatic and normocephalic, without obvious abnormality.   Eyes:            PERRLA, conjunctivae and sclerae normal, no Icterus. No pallor. Extra-occular movements are within normal limits.   Ears:    Ears appear intact with no abnormalities noted.   Throat:   No oral lesions, no thrush, oral mucosa moist.   Neck:   Supple, trachea midline, no thyromegaly, no carotid bruit, no lymphadenopathy   Lungs:     Chest shape is normal. Breath sounds heard bilaterally equally.  No crackles or wheezing. No Pleural rub or bronchial breathing.      Heart:    Normal S1 and S2, no murmur, no gallop, no rub. No JVD   Abdomen:     Normal bowel sounds, no masses, no organomegaly. Soft        non-tender, non-distended, no guarding, no rebound                tenderness   Extremities:   Moves all extremities well, no edema, no cyanosis, no             clubbingHe has chronic ulcer on the base of the right first toe and her very tender to touch with serous discharge    Skin:   Redness and inflammation around the right toe first    Neurologic:   Cranial nerves 2 - 12 grossly intact, sensation intact, Motor power is normal and equal bilaterally.       EKG:      EKG not done    Labs:    Lab Results (last 24 hours)     Procedure Component Value Units Date/Time    Sedimentation Rate [[id number]]  (Normal) Collected:  06/03/18 1830    Specimen:  Blood Updated:  06/03/18 1946     Sed Rate 13 mm/hr     Wound Culture - Wound, Toe, Right [[id number]] Collected:  06/03/18 1902    Specimen:  Wound from Toe, Right Updated:  06/03/18 1904    Comprehensive Metabolic Panel [[id number]]  (Abnormal) Collected:  06/03/18 1830    Specimen:  Blood Updated:  06/03/18 1853     Glucose 136 (H) mg/dL      BUN 12 mg/dL      Creatinine 0.60 mg/dL      Sodium 139 mmol/L      Potassium 4.2 mmol/L      Chloride 101 mmol/L      CO2 26.0 mmol/L      Calcium 9.1 mg/dL      Total Protein 7.4 g/dL      Albumin  4.30 g/dL      ALT (SGPT) 35 U/L      AST (SGOT) 22 U/L      Alkaline Phosphatase 96 U/L      Total Bilirubin 0.4 mg/dL      eGFR Non African Amer 149 mL/min/1.73      Globulin 3.1 gm/dL      A/G Ratio 1.4 g/dL      BUN/Creatinine Ratio 20.0     Anion Gap 16.2 mmol/L     Narrative:       GFR Normal >60  Chronic Kidney Disease <60  Kidney Failure <15    C-reactive Protein [[id number]]  (Abnormal) Collected:  06/03/18 1830    Specimen:  Blood Updated:  06/03/18 1853     C-Reactive Protein 6.40 (H) mg/dL     Lactic Acid, Plasma [[id number]]  (Normal) Collected:  06/03/18 1830    Specimen:  Blood Updated:  06/03/18 1851     Lactate 1.4 mmol/L     CBC & Differential [[id number]] Collected:  06/03/18 1830    Specimen:  Blood Updated:  06/03/18 1840    Narrative:       The following orders were created for panel order CBC & Differential.  Procedure                               Abnormality         Status                     ---------                               -----------         ------                     CBC Auto Differential[[id number]]        Abnormal            Final result                 Please view results for these tests on the individual orders.    CBC Auto Differential [[id number]]  (Abnormal) Collected:  06/03/18 1830    Specimen:  Blood Updated:  06/03/18 1840     WBC 16.70 (H) 10*3/mm3      RBC 5.52 10*6/mm3      Hemoglobin 17.2 g/dL      Hematocrit 49.3 %      MCV 89.3 fL      MCH 31.2 (H) pg      MCHC 34.9 g/dL      RDW 12.3 %      RDW-SD 40.4 fl      MPV 9.7 fL      Platelets 230 10*3/mm3      Neutrophil % 73.7 %      Lymphocyte % 17.0 %      Monocyte % 6.2 %      Eosinophil % 2.3 %      Basophil % 0.4 %      Immature Grans % 0.4 %      Neutrophils, Absolute 12.31 (H) 10*3/mm3      Lymphocytes, Absolute 2.84 10*3/mm3      Monocytes, Absolute 1.03 (H) 10*3/mm3      Eosinophils, Absolute 0.39 10*3/mm3      Basophils, Absolute 0.07 10*3/mm3      Immature Grans, Absolute 0.06 10*3/mm3      nRBC 0.0 /100 WBC      Blood Culture With BHUPENDRA - Blood, [[id number]] Collected:  06/03/18 1830    Specimen:  Blood from Arm, Left Updated:  06/03/18 1836          Radiology:    Imaging Results (last 72 hours)     Procedure Component Value Units Date/Time    XR Foot 3+ View Right [[id number]] Collected:  06/03/18 1852     Updated:  06/03/18 1853    Narrative:       FINAL REPORT    CLINICAL HISTORY:  pain, drainage to great toe, hot to touch, quest osteo    FINDINGS:  There is first digit soft tissue swelling without fracture,  dislocation, radiopaque foreign body or periostitis.  If there  is concern for osteomyelitis, recommend three-phase bone scan as  it is more sensitive than plain film.  Remaining bony structures  are intact.  Impression: First digit cellulitis.  If concern for  osteomyelitis, recommend three-phase bone scan      Impression:       Authenticated by Caitlin Ling MD on 06/03/2018 06:52:50 PM          Assessment:    Assessment/Plan     Principal Problem:    Subacute osteomyelitis of right foot  Active Problems:    Diabetic ulcer of toe of left foot associated with type 2 diabetes mellitus    Cellulitis of left lower extremity    Neuropathy    Hypertension    High cholesterol    Anxiety and depression        Plan:     Patient with worsening acute cellulitis of the right toe with a non-healing chronic ulcer, who is a diabetic and poorly controlled most probably having osteomyelitis of the toe which has failed outpatient management and ongoing for more than a few months.  He will be started on IV Zosyn and vancomycin after cultures done.  Also blood cultures to be done and the consult Dr. Antoine in a.m. and most probably will need surgical management.  Patient the also is a poorly controlled diabetic and will cover him with insulin along with his other medications see details as per order.    Lamine Loyd MD  06/03/18  8:28 PM    Please note that portions of this note may have been completed with a voice recognition  program. Efforts were made to edit the dictations, but occasionally words are mistranscribed.

## 2018-06-04 NOTE — PROGRESS NOTES
[facility]    Name:  Wai Medeiros   Age:  42 y.o.  Sex:  male  :  1975  MRN:  [id number]   Visit Number:  [id number]  Admission Date:  6/3/2018  Date Of Service:  18  Primary Care Physician:  Lamine Loyd MD, MD     LOS: 1 day :  Patient Care Team:  Lamine YIN MD as PCP - General (Radiology):      Subjective / Interval History:      patient has been admitted because of her cellulitis of the foot not responding to outpatient management with chronic lcer and possible undrlying osteomyelitis.  His pain is slightly bettertoday there is no fever      Vital Signs:    Temp:  [98.4 °F (36.9 °C)-98.9 °F (37.2 °C)] 98.6 °F (37 °C)  Heart Rate:  [] 84  Resp:  [18] 18  BP: (106-129)/(50-80) 106/50    Intake and output:    I/O last 3 completed shifts:  In: 600 [IV Piggyback:600]  Out: 1800 [Urine:1800]  I/O this shift:  In: 600 [IV Piggyback:600]  Out: -     Physical Examination:    General Appearance:    Alert and cooperative, not in any acute distress.   Head:    Atraumatic and normocephalic, without obvious abnormality.   Eyes:            PERRLA,  No pallor. Extra-occular movements are within normal limits.   Neck:   Supple,  No lymphglands, no bruit   Lungs:     Chest shape is normal. Breath sounds heard bilaterally equally.  No crackles or wheezing.     Heart:    Normal S1 and S2, no murmur,  No JVD   Abdomen:     Normal bowel sounds, no masses, no organomegaly. Soft        non-tender, no guarding, no rebound                tenderness   Extremities:   Moves all extremities well, no edema, no cyanosis,  The right the bse of the foot has a chronic nonhealig ler withlocal cellulitis and erythema   Skin:   No  bruising or rash.   Neurologic:   Grossly non focal and moves all extrimities equally.    Laboratory results:    Results from last 7 days  Lab Units 18  0532 18  1830   SODIUM mmol/L 142 139   POTASSIUM mmol/L 4.6 4.2    CHLORIDE mmol/L 106 101   CO2 mmol/L 27.0 26.0   BUN mg/dL 13 12   CREATININE mg/dL 0.70 0.60   CALCIUM mg/dL 8.9 9.1   BILIRUBIN mg/dL 0.4 0.4   ALK PHOS U/L 81 96   ALT (SGPT) U/L 36 35   AST (SGOT) U/L 25 22   GLUCOSE mg/dL 179* 136*       Results from last 7 days  Lab Units 06/04/18  0532 06/03/18  1830   WBC 10*3/mm3 15.70* 16.70*   HEMOGLOBIN g/dL 16.1 17.2   HEMATOCRIT % 47.3 49.3   PLATELETS 10*3/mm3 227 230               Results from last 7 days  Lab Units 06/03/18  1830   BLOODCX  No growth at less than 24 hours       Radiology results:    Imaging Results (last 24 hours)     Procedure Component Value Units Date/Time    XR Foot 3+ View Right [[id number]] Collected:  06/03/18 1852     Updated:  06/03/18 1853    Narrative:       FINAL REPORT    CLINICAL HISTORY:  pain, drainage to great toe, hot to touch, quest osteo    FINDINGS:  There is first digit soft tissue swelling without fracture,  dislocation, radiopaque foreign body or periostitis.  If there  is concern for osteomyelitis, recommend three-phase bone scan as  it is more sensitive than plain film.  Remaining bony structures  are intact.  Impression: First digit cellulitis.  If concern for  osteomyelitis, recommend three-phase bone scan      Impression:       Authenticated by Caitlin Ling MD on 06/03/2018 06:52:50 PM          I have reviewed the patient's radiology reports.    Medication Review:     I have reviewed the patients active and prn medications.     Assessment:    Principal Problem:    Subacute osteomyelitis of right foot  Active Problems:    Diabetic ulcer of toe of left foot associated with type 2 diabetes mellitus    Cellulitis of left lower extremity    Neuropathy    Hypertension    High cholesterol    Anxiety and depression          Plan:     patient with cellulitis of the rigfoot with chroni nonhealing ulcer who is diabetic and poorly controlled and having most probably undrlying osteomyelitis.  Broad-spectrum antibiotics Zosyn  and vancomycin hs grant started cultures have been done CURTIS three-phase bone scan has been ordered.  Consult Dr. Antoine and possibly will need surgical intervention.  We'll also do ultrasound of the leg to rule out the vascular disease.    Lamine Loyd MD  06/04/18  9:36 AM      Please note that portions of this note may have been completed with a voice recognition program. Efforts were made to edit the dictations, but occasionally words are mistranscribed.

## 2018-06-04 NOTE — PROGRESS NOTES
Discharge Planning Assessment   David     Patient Name: Wai Medeiros  MRN: [id number]  Today's Date: 6/4/2018    Admit Date: 6/3/2018          Discharge Needs Assessment     Row Name 06/04/18 1044       Living Environment    Lives With spouse    Current Living Arrangements home/apartment/condo    Primary Care Provided by self    Provides Primary Care For no one    Family Caregiver if Needed spouse    Able to Return to Prior Arrangements yes       Resource/Environmental Concerns    Transportation Concerns car, none       Transition Planning    Patient/Family Anticipated Services at Transition home health care       Discharge Needs Assessment    Readmission Within the Last 30 Days no previous admission in last 30 days    Concerns to be Addressed discharge planning            Discharge Plan     Row Name 06/04/18 1045       Plan    Plan Home     Patient/Family in Agreement with Plan yes    Plan Comments  SpoKe to PT and Wife with his permission  He is independent with ADLS Confirmed address and Phone number Denies any equipment Discharge plan depends on clinical course  His goal is to return home         Destination     No service coordination in this encounter.      Durable Medical Equipment     No service coordination in this encounter.      Dialysis/Infusion     No service coordination in this encounter.      Home Medical Care     No service coordination in this encounter.      Social Care     No service coordination in this encounter.        Expected Discharge Date and Time     Expected Discharge Date Expected Discharge Time    Jun 6, 2018               Demographic Summary     Row Name 06/04/18 1043       General Information    Admission Type inpatient    Referral Source admission list    Reason for Consult discharge planning            Functional Status     Row Name 06/04/18 1044       Functional Status    Usual Activity Tolerance good       Functional Status, IADL    Medications independent     Meal Preparation independent    Housekeeping independent    Laundry independent    Shopping independent       Mental Status    General Appearance WDL WDL            Psychosocial    No documentation.           Abuse/Neglect    No documentation.           Legal    No documentation.           Substance Abuse    No documentation.           Patient Forms    No documentation.         Katt Blood

## 2018-06-04 NOTE — PLAN OF CARE
Problem: Patient Care Overview  Goal: Plan of Care Review  Outcome: Ongoing (interventions implemented as appropriate)   06/04/18 8043   Coping/Psychosocial   Plan of Care Reviewed With patient;spouse   Plan of Care Review   Progress no change       Problem: Skin and Soft Tissue Infection (Adult)  Goal: Signs and Symptoms of Listed Potential Problems Will be Absent, Minimized or Managed (Skin and Soft Tissue Infection)  Outcome: Ongoing (interventions implemented as appropriate)      Problem: Wound (Includes Pressure Injury) (Adult)  Goal: Signs and Symptoms of Listed Potential Problems Will be Absent, Minimized or Managed (Wound)  Outcome: Ongoing (interventions implemented as appropriate)

## 2018-06-04 NOTE — PLAN OF CARE
Problem: Patient Care Overview  Goal: Plan of Care Review  Outcome: Ongoing (interventions implemented as appropriate)      Problem: Skin and Soft Tissue Infection (Adult)  Goal: Signs and Symptoms of Listed Potential Problems Will be Absent, Minimized or Managed (Skin and Soft Tissue Infection)  Outcome: Ongoing (interventions implemented as appropriate)      Problem: Wound (Includes Pressure Injury) (Adult)  Goal: Signs and Symptoms of Listed Potential Problems Will be Absent, Minimized or Managed (Wound)  Outcome: Ongoing (interventions implemented as appropriate)

## 2018-06-04 NOTE — CONSULTS
Referring Provider: Dr Loyd  Reason for Consultation: Right great toe wound/cellulitis    Patient Care Team:  Lamine YIN MD as PCP - General (Radiology)    Chief complaint right great toe wound/cellulitis    Subjective .     History of present illness:  Wai is a very pleasant 42-year-old diabetic male well known to me for bilateral great toe ulcerations.  He status post Verma arthroplasty/bunionectomy on the left foot which successfully healed the ulceration.  We have planned a similar procedure for his right foot and while I had recommended he have this performed in the past he had multiple personal things going on over the last few weeks and he elected to wait.  I had hoped that we would be able to have this planned within the first week or 2 of June once his travel plans and election obligations were finished.  He states that he began developing some redness and swelling and warmth and pain to the right foot.  Says he also developed a fever so he was then seen in the emergency department and admitted by his primary care physician.  He has been started on broad-spectrum antibiotics.  X-rays and 3 phase bone scan have been ordered along with arterial ultrasound.  The patient is seen today outside eating ice cream with his wife.  He does admit to smoking while outside.    Review of Systems  All systems were reviewed and negative except for chief complaint.    History  Past Medical History:   Diagnosis Date   • Anxiety and depression    • Constipation    • Depression    • Diabetes mellitus    • Fatty liver    • GERD (gastroesophageal reflux disease)    • High cholesterol    • History of methicillin resistant staphylococcus aureus (MRSA) 2013    RIGHT THIGH   • Hypertension    • Injury of back    • Neuropathy    • Pancreatitis    • Wears glasses    ,   Past Surgical History:   Procedure Laterality Date   • APPENDECTOMY     • BACK SURGERY      L4-5 LAMINECTOMY   • CHOLECYSTECTOMY     • FOOT ARTHROPLASTY  Left 4/2/2018    Procedure: Left foot Verma arthroplasty, wound debridement and grafting;  Surgeon: Sammy Antoine DPM;  Location: [facility] OR;  Service: Orthopedics   • FOOT IRRIGATION, DEBRIDEMENT AND REPAIR Left 1/6/2018    Procedure: Left foot irrigation and debridement, wound closure, graft application;  Surgeon: Sammy Antoine DPM;  Location: [facility] OR;  Service:    • INCISION AND DRAINAGE FOOT Left 1/3/2018    Procedure: INCISION AND DRAINAGE FOOT ABSCESS, GREAT TOE;  Surgeon: Sammy Antoine DPM;  Location: [facility] OR;  Service:    • SCROTAL SURGERY     • WISDOM TOOTH EXTRACTION     ,   Family History   Problem Relation Age of Onset   • Diabetes Mother    • COPD Mother    • Diabetes Father    ,   Social History   Substance Use Topics   • Smoking status: Current Every Day Smoker     Packs/day: 1.00     Types: Cigarettes   • Smokeless tobacco: Current User     Types: Snuff   • Alcohol use No   ,   Prescriptions Prior to Admission   Medication Sig Dispense Refill Last Dose   • Canagliflozin (INVOKANA) 300 MG tablet Take 300 mg by mouth Daily.   6/3/2018 at Unknown time   • DULoxetine (CYMBALTA) 60 MG capsule Take 60 mg by mouth Daily.   6/3/2018 at Unknown time   • glimepiride (AMARYL) 4 MG tablet Take 8 mg by mouth Every Morning Before Breakfast.   6/3/2018 at Unknown time   • lisinopril (PRINIVIL,ZESTRIL) 40 MG tablet Take 40 mg by mouth Daily.   6/3/2018 at Unknown time   • LYRICA 150 MG capsule Take 1 tablet by mouth three times daily  0 6/3/2018 at Unknown time   • metFORMIN (GLUCOPHAGE) 1000 MG tablet Take 1,000 mg by mouth 2 (Two) Times a Day.  3 6/3/2018 at Unknown time   • oxyCODONE (oxyCONTIN) 10 MG 12 hr tablet Take 10 mg by mouth Every 12 (Twelve) Hours.   6/3/2018 at Unknown time   • OXYCONTIN 15 MG tablet extended-release 12 hour Take 15 mg by mouth Every 12 (Twelve) Hours.   6/3/2018 at Unknown time   • simvastatin (ZOCOR) 40 MG tablet Take 40 mg by mouth every night at bedtime.  3 6/3/2018    • acetaminophen (TYLENOL 8 HOUR) 650 MG 8 hr tablet Take 1 tablet by mouth Every 8 (Eight) Hours As Needed for Mild Pain . 12 tablet 0 Unknown at Unknown time   • furosemide (LASIX) 40 MG tablet Take 40 mg by mouth Daily.   Taking   • HYDROcodone-acetaminophen (NORCO) 5-325 MG per tablet Take 1-2 tablets by mouth Every 4 (Four) Hours As Needed for pain. (Patient not taking: Reported on 6/4/2018) 20 tablet 0 Not Taking at Unknown time   • ibuprofen (ADVIL,MOTRIN) 200 MG tablet Take 200 mg by mouth Every 6 (Six) Hours As Needed for Mild Pain .   Unknown at Unknown time   • ondansetron ODT (ZOFRAN-ODT) 4 MG disintegrating tablet Take 1 tablet by mouth Every 8 (Eight) Hours As Needed for Nausea or Vomiting. (Patient not taking: Reported on 6/4/2018) 8 tablet 0 Not Taking at Unknown time   • sulfamethoxazole-trimethoprim (BACTRIM DS) 800-160 MG per tablet Take 1 tablet by mouth 2 (Two) Times a Day. (Patient not taking: Reported on 6/4/2018) 20 tablet 1 Not Taking at Unknown time   , Scheduled Meds:      buPROPion  mg Oral QAM   DULoxetine 60 mg Oral Daily   glipiZIDE 10 mg Oral QAM AC   insulin aspart 0-9 Units Subcutaneous 4x Daily AC & at Bedtime   lisinopril 40 mg Oral Daily   metFORMIN 1,000 mg Oral BID   oxyCODONE 10 mg Oral Q8H   piperacillin-tazobactam 3.375 g Intravenous Q8H   pregabalin 150 mg Oral BID   vancomycin 2,000 mg Intravenous Q12H   [START ON 6/5/2018] Pharmacy Consult  Does not apply Once   , Continuous Infusions:      Pharmacy to dose vancomycin     sodium chloride 100 mL/hr Last Rate: 100 mL/hr (06/03/18 1489)   , PRN Meds:  •  acetaminophen  •  dextrose  •  dextrose  •  glucagon (human recombinant)  •  magnesium hydroxide  •  ondansetron ODT  •  oxyCODONE-acetaminophen  •  Pharmacy to dose vancomycin  •  Insert peripheral IV **AND** sodium chloride and Allergies:  Patient has no known allergies.    Objective     Vital Signs   /66 (BP Location: Left arm, Patient Position: Sitting)    "Pulse 90   Temp 98.4 °F (36.9 °C) (Oral)   Resp 18   Ht 185.4 cm (73\")   Wt 116 kg (255 lb 9.6 oz)   SpO2 98%   BMI 33.72 kg/m²     Physical Exam:AAO ×3, NAD, AF VSS  PERRLA, cranial nerves II through XII intact  Heart: Regular rhythm  Lungs: CTA bilaterally  Abdomen, soft, nontender    Physical exam of the right foot shows he's grossly neurovascularly intact.  Pedal hair noted.  Pulses palpable.  CFT brisk.  Edema is present to the medial and plantar great toe and first MTPJ.  Gross sensation is intact but protective sensation is lost.  No gross orthopedic deformities are present however he still maintains decreased range of motion of the first MTPJ.  There is a calloused ulceration along the plantar medial aspect of the hallux.  There is blanchable erythema and edema present to the hallux and medial and plantar first MTPJ including the interspace.  He states there has been malodorous drainage but there is presently no odor noted.     Lab Results Review: All laboratory data reviewed.  White blood cell count is 15.  ESR and CRP are elevated.  Slight left shift noted.  Imaging Results: Plain view x-rays of the right foot taken upon admission show no obvious bony changes or erosions.  There is no change to the cortex of the proximal phalanx or distal phalanx of the great toe.  3 phase bone scan shows increased tracer uptake and reactivity of both great toe joints bilaterally.  These findings are consistent with postsurgical change on the left foot and are not 100% diagnostic for osteomyelitis on the right foot.  Increased tracer activity can be noted simply from the ulceration and cellulitis itself.    Assessment/Plan   42year-old diabetic male with chronic right great toe ulceration, cellulitis, questionable osteomyelitis, hallux limitus  Principal Problem:    Subacute osteomyelitis of right foot  Active Problems:    Diabetic ulcer of toe of left foot associated with type 2 diabetes mellitus    Cellulitis of " left lower extremity    Neuropathy    Hypertension    High cholesterol    Anxiety and depression  I would like to obtain MRI this evening or in the morning to evaluate the extent, if any of osteomyelitis of the proximal phalanx.  It could also be possible that this is developed in the distal phalanx which would change our surgical planning and most likely result in potential need for amputation.  As of now I think we can plan to proceed with our previous plan of Verma arthroplasty to help increase his range of motion of the first MTPJ and decrease the pressure of the interphalangeal joint allowing the wound to heal.  He may still need a longer course of oral or IV antibiotics based off culture results and bone biopsy.  Surgical orders will be placed and I will discuss with the operating room schedule her.  I will hope to have his surgery performed in the next 1-2 days based off operating room time.  Orders will be placed for Betadine wet-to-dry dressing.  He is wearing regular tennis shoes and I recommend he go back to wearing his offloading Darco shoe.  Betadine wet-to-dry dressing changes to the right great toe should be changed daily.  I will evaluate his MRI later this evening or in the morning once finished.  I will not place nothing by mouth orders until I know a definitive surgical date and time but his surgical orders themselves have been placed.  Call me any questions.        I discussed the patients findings and my recommendations with patient and family    Sammy Antoine DPM  06/04/18  6:17 PM

## 2018-06-04 NOTE — PROGRESS NOTES
Adult Nutrition  Assessment/PES    Patient Name:  Wai Medeiros  YOB: 1975  MRN: [id number]  Admit Date:  6/3/2018    Assessment Date:  6/4/2018    Comments:  Rec. #1: Continue with diet as ordered. Pt. Consuming ~75% of meals on average per NSG. RD added Catracho and Glucerna both BID. Rec. #2: Consider adding MVI with minerals to promote healing. Consult RD PRN.           Reason for Assessment     Row Name 06/04/18 1601          Reason for Assessment    Reason For Assessment diagnosis/disease state     Diagnosis diabetes diagnosis/complications;other (see comments);cardiac disease;neurologic conditions   osteomyelitis foot, DM Type 2, HTN, Neuropathy, Cellulitis     Identified At Risk by Screening Criteria large or nonhealing wound, burn or pressure injury                 Labs/Tests/Procedures/Meds     Row Name 06/04/18 1603          Labs/Procedures/Meds    Lab Results Reviewed reviewed, pertinent     Lab Results Comments High: Glucose             Physical Findings     Row Name 06/04/18 1603          Physical Findings    Overall Physical Appearance obese     Skin non-healing wound(s)             Estimated/Assessed Needs     Row Name 06/04/18 1604          Calculation Measurements    Weight Used For Calculations 88.4 kg (194 lb 14.2 oz)        Estimated/Assessed Needs    Additional Documentation Fluid Requirements (Group);Fergus-St. Jeor Equation (Group);Protein Requirements (Group);Calorie Requirements (Group)        Calorie Requirements    Estimated Calorie Requirement (kcal/day) --   ~7467-6912        KCAL/KG    14 Kcal/Kg (kcal) 1237.6     15 Kcal/Kg (kcal) 1326     18 Kcal/Kg (kcal) 1591.2     20 Kcal/Kg (kcal) 1768     25 Kcal/Kg (kcal) 2210     30 Kcal/Kg (kcal) 2652     35 Kcal/Kg (kcal) 3094     40 Kcal/Kg (kcal) 3536     45 Kcal/Kg (kcal) 3978     50 Kcal/Kg (kcal) 4420        Fergus-St. Jeor Equation    RMR (Fergus-St. Jeor Equation) 1847.88        Protein Requirements    Est  Protein Requirement Amount (gms/kg) 1.5 gm protein   ~106..6     Estimated Protein Requirements (gms/day) 132.6        Fluid Requirements    Estimated Fluid Requirement Method Streeter-Segar Formula     Shirin-Segar Method (over 20 kg) 3268             Nutrition Prescription Ordered     Row Name 06/04/18 1605          Nutrition Prescription PO    Current PO Diet Regular     Common Modifiers Consistent Carbohydrate             Evaluation of Received Nutrient/Fluid Intake     Row Name 06/04/18 1605 06/04/18 1604       Calculation Measurements    Weight Used For Calculations  -- 88.4 kg (194 lb 14.2 oz)       PO Evaluation    Number of Days PO Intake Evaluated 1 day  --    Number of Meals 1  --    % PO Intake 75  --            Evaluation of Prescribed Nutrient/Fluid Intake     Row Name 06/04/18 1604          Calculation Measurements    Weight Used For Calculations 88.4 kg (194 lb 14.2 oz)           Problem/Interventions:        Problem 1     Row Name 06/04/18 1605          Nutrition Diagnoses Problem 1    Problem 1 Increased Nutrient Needs     Macronutrient Kcal;Fluid;Protein     Micronutrient Vitamin;Mineral     Etiology (related to) Medical Diagnosis     Skin Skin breakdown;Non healing wound     Signs/Symptoms (evidenced by) Other (comment)   wound healing             Problem 2     Row Name 06/04/18 1605          Nutrition Diagnoses Problem 2    Problem 2 Altered Nutrition Related to Labs     Etiology (related to) Medical Diagnosis     Endocrine DM Type 2     Signs/Symptoms (evidenced by) Biochemical     Specific Labs Noted Glucose             Problem 3     Row Name 06/04/18 1606          Nutrition Diagnoses Problem 3    Problem 3 Overweight/Obesity     Etiology (related to) Factors Affecting Nutrition     Food Habit/Preferences Large Meals     Signs/Symptoms (evidenced by) Other (comment)   BMI >30                 Intervention Goal     Row Name 06/04/18 1606          Intervention Goal    General Meet  nutritional needs for age/condition     PO PO intake (%)     PO Intake % 75 %             Nutrition Intervention     Row Name 06/04/18 1606          Nutrition Intervention    RD/Tech Action Encourage intake;Recommend/ordered;Supplement provided;Interview for preference;Advise available snack;Follow Tx progress     Recommended/Ordered Supplement       [unfilled]        Education/Evaluation     Row Name 06/04/18 1607          Education    Education Will Instruct as appropriate        Monitor/Evaluation    Monitor Per protocol;PO intake;Supplement intake;Pertinent labs;Weight;Skin status         Electronically signed by:  Nevaeh Franco RD  06/04/18 4:08 PM

## 2018-06-05 ENCOUNTER — ANESTHESIA EVENT (OUTPATIENT)
Dept: PERIOP | Facility: HOSPITAL | Age: 43
End: 2018-06-05

## 2018-06-05 ENCOUNTER — ANESTHESIA (OUTPATIENT)
Dept: PERIOP | Facility: HOSPITAL | Age: 43
End: 2018-06-05

## 2018-06-05 LAB
ALBUMIN SERPL-MCNC: 4.3 G/DL (ref 3.5–5)
ALBUMIN/GLOB SERPL: 1.3 G/DL (ref 1–2)
ALP SERPL-CCNC: 84 U/L (ref 38–126)
ALT SERPL W P-5'-P-CCNC: 41 U/L (ref 13–69)
ANION GAP SERPL CALCULATED.3IONS-SCNC: 13.8 MMOL/L (ref 10–20)
AST SERPL-CCNC: 41 U/L (ref 15–46)
BASOPHILS # BLD AUTO: 0.06 10*3/MM3 (ref 0–0.2)
BASOPHILS NFR BLD AUTO: 0.5 % (ref 0–2.5)
BILIRUB SERPL-MCNC: 0.4 MG/DL (ref 0.2–1.3)
BUN BLD-MCNC: 13 MG/DL (ref 7–20)
BUN/CREAT SERPL: 18.6 (ref 6.3–21.9)
CALCIUM SPEC-SCNC: 9.5 MG/DL (ref 8.4–10.2)
CHLORIDE SERPL-SCNC: 105 MMOL/L (ref 98–107)
CO2 SERPL-SCNC: 29 MMOL/L (ref 26–30)
CREAT BLD-MCNC: 0.7 MG/DL (ref 0.6–1.3)
DEPRECATED RDW RBC AUTO: 41.9 FL (ref 37–54)
EOSINOPHIL # BLD AUTO: 0.57 10*3/MM3 (ref 0–0.7)
EOSINOPHIL NFR BLD AUTO: 5.1 % (ref 0–7)
ERYTHROCYTE [DISTWIDTH] IN BLOOD BY AUTOMATED COUNT: 12.5 % (ref 11.5–14.5)
GFR SERPL CREATININE-BSD FRML MDRD: 125 ML/MIN/1.73
GLOBULIN UR ELPH-MCNC: 3.3 GM/DL
GLUCOSE BLD-MCNC: 125 MG/DL (ref 74–98)
GLUCOSE BLDC GLUCOMTR-MCNC: 129 MG/DL (ref 70–130)
GLUCOSE BLDC GLUCOMTR-MCNC: 158 MG/DL (ref 70–130)
GLUCOSE BLDC GLUCOMTR-MCNC: 199 MG/DL (ref 70–130)
HCT VFR BLD AUTO: 50.3 % (ref 42–52)
HGB BLD-MCNC: 16.8 G/DL (ref 14–18)
IMM GRANULOCYTES # BLD: 0.04 10*3/MM3 (ref 0–0.06)
IMM GRANULOCYTES NFR BLD: 0.4 % (ref 0–0.6)
LYMPHOCYTES # BLD AUTO: 3.16 10*3/MM3 (ref 0.6–3.4)
LYMPHOCYTES NFR BLD AUTO: 28.4 % (ref 10–50)
MCH RBC QN AUTO: 30.5 PG (ref 27–31)
MCHC RBC AUTO-ENTMCNC: 33.4 G/DL (ref 30–37)
MCV RBC AUTO: 91.5 FL (ref 80–94)
MONOCYTES # BLD AUTO: 0.73 10*3/MM3 (ref 0–0.9)
MONOCYTES NFR BLD AUTO: 6.6 % (ref 0–12)
NEUTROPHILS # BLD AUTO: 6.55 10*3/MM3 (ref 2–6.9)
NEUTROPHILS NFR BLD AUTO: 59 % (ref 37–80)
NRBC BLD MANUAL-RTO: 0 /100 WBC (ref 0–0)
PLATELET # BLD AUTO: 238 10*3/MM3 (ref 130–400)
PMV BLD AUTO: 9.5 FL (ref 6–12)
POTASSIUM BLD-SCNC: 4.8 MMOL/L (ref 3.5–5.1)
PROT SERPL-MCNC: 7.6 G/DL (ref 6.3–8.2)
RBC # BLD AUTO: 5.5 10*6/MM3 (ref 4.7–6.1)
SODIUM BLD-SCNC: 143 MMOL/L (ref 137–145)
VANCOMYCIN TROUGH SERPL-MCNC: 13.57 MCG/ML (ref 5–15)
WBC NRBC COR # BLD: 11.11 10*3/MM3 (ref 4.8–10.8)

## 2018-06-05 PROCEDURE — 85025 COMPLETE CBC W/AUTO DIFF WBC: CPT | Performed by: INTERNAL MEDICINE

## 2018-06-05 PROCEDURE — 0QBN0ZZ EXCISION OF RIGHT METATARSAL, OPEN APPROACH: ICD-10-PCS | Performed by: PODIATRIST

## 2018-06-05 PROCEDURE — 87176 TISSUE HOMOGENIZATION CULTR: CPT | Performed by: PODIATRIST

## 2018-06-05 PROCEDURE — 0SQH0ZZ REPAIR RIGHT TARSAL JOINT, OPEN APPROACH: ICD-10-PCS | Performed by: PODIATRIST

## 2018-06-05 PROCEDURE — 87186 SC STD MICRODIL/AGAR DIL: CPT | Performed by: PODIATRIST

## 2018-06-05 PROCEDURE — 25010000002 PROPOFOL 1000 MG/ML EMULSION: Performed by: NURSE ANESTHETIST, CERTIFIED REGISTERED

## 2018-06-05 PROCEDURE — 87075 CULTR BACTERIA EXCEPT BLOOD: CPT | Performed by: PODIATRIST

## 2018-06-05 PROCEDURE — 0QBQ0ZZ EXCISION OF RIGHT TOE PHALANX, OPEN APPROACH: ICD-10-PCS | Performed by: PODIATRIST

## 2018-06-05 PROCEDURE — C1713 ANCHOR/SCREW BN/BN,TIS/BN: HCPCS | Performed by: PODIATRIST

## 2018-06-05 PROCEDURE — 25010000002 PIPERACILLIN SOD-TAZOBACTAM PER 1 G: Performed by: INTERNAL MEDICINE

## 2018-06-05 PROCEDURE — 25010000002 PROPOFOL 10 MG/ML EMULSION: Performed by: NURSE ANESTHETIST, CERTIFIED REGISTERED

## 2018-06-05 PROCEDURE — 25010000002 DEXAMETHASONE PER 1 MG: Performed by: NURSE ANESTHETIST, CERTIFIED REGISTERED

## 2018-06-05 PROCEDURE — 87070 CULTURE OTHR SPECIMN AEROBIC: CPT | Performed by: PODIATRIST

## 2018-06-05 PROCEDURE — 82962 GLUCOSE BLOOD TEST: CPT

## 2018-06-05 PROCEDURE — 15275 SKIN SUB GRAFT FACE/NK/HF/G: CPT | Performed by: PODIATRIST

## 2018-06-05 PROCEDURE — 25010000002 VANCOMYCIN PER 500 MG: Performed by: INTERNAL MEDICINE

## 2018-06-05 PROCEDURE — 25010000002 ONDANSETRON PER 1 MG: Performed by: NURSE ANESTHETIST, CERTIFIED REGISTERED

## 2018-06-05 PROCEDURE — 87077 CULTURE AEROBIC IDENTIFY: CPT | Performed by: PODIATRIST

## 2018-06-05 PROCEDURE — 80202 ASSAY OF VANCOMYCIN: CPT

## 2018-06-05 PROCEDURE — 87147 CULTURE TYPE IMMUNOLOGIC: CPT | Performed by: PODIATRIST

## 2018-06-05 PROCEDURE — 28292 COR HLX VLGS RSC PRX PHLX BS: CPT | Performed by: PODIATRIST

## 2018-06-05 PROCEDURE — 80053 COMPREHEN METABOLIC PANEL: CPT | Performed by: INTERNAL MEDICINE

## 2018-06-05 PROCEDURE — 25010000002 METOCLOPRAMIDE PER 10 MG: Performed by: NURSE ANESTHETIST, CERTIFIED REGISTERED

## 2018-06-05 PROCEDURE — 63710000001 INSULIN ASPART PER 5 UNITS: Performed by: INTERNAL MEDICINE

## 2018-06-05 PROCEDURE — 0LQV0ZZ REPAIR RIGHT FOOT TENDON, OPEN APPROACH: ICD-10-PCS | Performed by: PODIATRIST

## 2018-06-05 DEVICE — ALLOGRFT AMNIO AMNIOFIX 2X3CM: Type: IMPLANTABLE DEVICE | Site: TOE FIRST | Status: FUNCTIONAL

## 2018-06-05 DEVICE — IMPLANTABLE DEVICE: Type: IMPLANTABLE DEVICE | Site: TOE FIRST | Status: FUNCTIONAL

## 2018-06-05 DEVICE — ALLOGRFT AMNIO AMNIOFIX 4X4CM: Type: IMPLANTABLE DEVICE | Site: TOE FIRST | Status: FUNCTIONAL

## 2018-06-05 RX ORDER — ONDANSETRON 2 MG/ML
4 INJECTION INTRAMUSCULAR; INTRAVENOUS ONCE AS NEEDED
Status: DISCONTINUED | OUTPATIENT
Start: 2018-06-05 | End: 2018-06-05 | Stop reason: HOSPADM

## 2018-06-05 RX ORDER — PROPOFOL 10 MG/ML
VIAL (ML) INTRAVENOUS AS NEEDED
Status: DISCONTINUED | OUTPATIENT
Start: 2018-06-05 | End: 2018-06-05 | Stop reason: SURG

## 2018-06-05 RX ORDER — LIDOCAINE HYDROCHLORIDE 10 MG/ML
INJECTION, SOLUTION INFILTRATION; PERINEURAL AS NEEDED
Status: DISCONTINUED | OUTPATIENT
Start: 2018-06-05 | End: 2018-06-05 | Stop reason: HOSPADM

## 2018-06-05 RX ORDER — BUPIVACAINE HYDROCHLORIDE 5 MG/ML
INJECTION, SOLUTION EPIDURAL; INTRACAUDAL
Status: DISPENSED
Start: 2018-06-05 | End: 2018-06-06

## 2018-06-05 RX ORDER — SODIUM CHLORIDE 0.9 % (FLUSH) 0.9 %
3 SYRINGE (ML) INJECTION AS NEEDED
Status: DISCONTINUED | OUTPATIENT
Start: 2018-06-05 | End: 2018-06-05 | Stop reason: HOSPADM

## 2018-06-05 RX ORDER — ONDANSETRON 2 MG/ML
INJECTION INTRAMUSCULAR; INTRAVENOUS AS NEEDED
Status: DISCONTINUED | OUTPATIENT
Start: 2018-06-05 | End: 2018-06-05 | Stop reason: SURG

## 2018-06-05 RX ORDER — SODIUM CHLORIDE, SODIUM LACTATE, POTASSIUM CHLORIDE, CALCIUM CHLORIDE 600; 310; 30; 20 MG/100ML; MG/100ML; MG/100ML; MG/100ML
1000 INJECTION, SOLUTION INTRAVENOUS CONTINUOUS
Status: DISCONTINUED | OUTPATIENT
Start: 2018-06-05 | End: 2018-06-06

## 2018-06-05 RX ORDER — OXYCODONE HCL 10 MG/1
10 TABLET, FILM COATED, EXTENDED RELEASE ORAL EVERY 12 HOURS
Status: DISCONTINUED | OUTPATIENT
Start: 2018-06-05 | End: 2018-06-05 | Stop reason: SDUPTHER

## 2018-06-05 RX ORDER — IBUPROFEN 200 MG
200 TABLET ORAL EVERY 6 HOURS PRN
Status: DISCONTINUED | OUTPATIENT
Start: 2018-06-05 | End: 2018-06-07 | Stop reason: HOSPADM

## 2018-06-05 RX ORDER — DEXAMETHASONE SODIUM PHOSPHATE 4 MG/ML
INJECTION, SOLUTION INTRA-ARTICULAR; INTRALESIONAL; INTRAMUSCULAR; INTRAVENOUS; SOFT TISSUE AS NEEDED
Status: DISCONTINUED | OUTPATIENT
Start: 2018-06-05 | End: 2018-06-05 | Stop reason: SURG

## 2018-06-05 RX ORDER — METOCLOPRAMIDE HYDROCHLORIDE 5 MG/ML
INJECTION INTRAMUSCULAR; INTRAVENOUS AS NEEDED
Status: DISCONTINUED | OUTPATIENT
Start: 2018-06-05 | End: 2018-06-05 | Stop reason: SURG

## 2018-06-05 RX ORDER — BUPIVACAINE HYDROCHLORIDE 5 MG/ML
INJECTION, SOLUTION EPIDURAL; INTRACAUDAL AS NEEDED
Status: DISCONTINUED | OUTPATIENT
Start: 2018-06-05 | End: 2018-06-05 | Stop reason: HOSPADM

## 2018-06-05 RX ORDER — LIDOCAINE HYDROCHLORIDE 10 MG/ML
INJECTION, SOLUTION INFILTRATION; PERINEURAL
Status: DISPENSED
Start: 2018-06-05 | End: 2018-06-06

## 2018-06-05 RX ORDER — BACITRACIN 50000 [IU]/1
INJECTION, POWDER, FOR SOLUTION INTRAMUSCULAR
Status: DISPENSED
Start: 2018-06-05 | End: 2018-06-06

## 2018-06-05 RX ORDER — LIDOCAINE HYDROCHLORIDE 20 MG/ML
INJECTION, SOLUTION EPIDURAL; INFILTRATION; INTRACAUDAL; PERINEURAL AS NEEDED
Status: DISCONTINUED | OUTPATIENT
Start: 2018-06-05 | End: 2018-06-05 | Stop reason: SURG

## 2018-06-05 RX ADMIN — INSULIN ASPART 2 UNITS: 100 INJECTION, SOLUTION INTRAVENOUS; SUBCUTANEOUS at 21:41

## 2018-06-05 RX ADMIN — LIDOCAINE HYDROCHLORIDE 100 MG: 20 INJECTION, SOLUTION EPIDURAL; INFILTRATION; INTRACAUDAL; PERINEURAL at 17:05

## 2018-06-05 RX ADMIN — OXYCODONE HYDROCHLORIDE AND ACETAMINOPHEN 1 TABLET: 10; 325 TABLET ORAL at 21:44

## 2018-06-05 RX ADMIN — PROPOFOL 550 MCG/KG/MIN: 10 INJECTION, EMULSION INTRAVENOUS at 18:06

## 2018-06-05 RX ADMIN — OXYCODONE HYDROCHLORIDE 10 MG: 10 TABLET, FILM COATED, EXTENDED RELEASE ORAL at 06:34

## 2018-06-05 RX ADMIN — GLIPIZIDE 10 MG: 5 TABLET ORAL at 06:34

## 2018-06-05 RX ADMIN — TAZOBACTAM SODIUM AND PIPERACILLIN SODIUM 3.38 G: 375; 3 INJECTION, SOLUTION INTRAVENOUS at 10:36

## 2018-06-05 RX ADMIN — ONDANSETRON 4 MG: 2 INJECTION INTRAMUSCULAR; INTRAVENOUS at 17:05

## 2018-06-05 RX ADMIN — PREGABALIN 150 MG: 75 CAPSULE ORAL at 08:00

## 2018-06-05 RX ADMIN — SODIUM CHLORIDE, POTASSIUM CHLORIDE, SODIUM LACTATE AND CALCIUM CHLORIDE 1000 ML: 600; 310; 30; 20 INJECTION, SOLUTION INTRAVENOUS at 15:50

## 2018-06-05 RX ADMIN — SODIUM CHLORIDE 100 ML/HR: 9 INJECTION, SOLUTION INTRAVENOUS at 09:35

## 2018-06-05 RX ADMIN — OXYCODONE HYDROCHLORIDE 10 MG: 10 TABLET, FILM COATED, EXTENDED RELEASE ORAL at 21:40

## 2018-06-05 RX ADMIN — LISINOPRIL 40 MG: 20 TABLET ORAL at 08:00

## 2018-06-05 RX ADMIN — OXYCODONE HYDROCHLORIDE AND ACETAMINOPHEN 1 TABLET: 10; 325 TABLET ORAL at 11:05

## 2018-06-05 RX ADMIN — PREGABALIN 150 MG: 75 CAPSULE ORAL at 21:40

## 2018-06-05 RX ADMIN — DEXAMETHASONE SODIUM PHOSPHATE 4 MG: 4 INJECTION, SOLUTION INTRAMUSCULAR; INTRAVENOUS at 17:05

## 2018-06-05 RX ADMIN — METOCLOPRAMIDE 10 MG: 5 INJECTION, SOLUTION INTRAMUSCULAR; INTRAVENOUS at 17:05

## 2018-06-05 RX ADMIN — TAZOBACTAM SODIUM AND PIPERACILLIN SODIUM 3.38 G: 375; 3 INJECTION, SOLUTION INTRAVENOUS at 01:31

## 2018-06-05 RX ADMIN — VANCOMYCIN HYDROCHLORIDE 2000 MG: 500 INJECTION, POWDER, LYOPHILIZED, FOR SOLUTION INTRAVENOUS at 10:28

## 2018-06-05 RX ADMIN — DULOXETINE HYDROCHLORIDE 60 MG: 30 CAPSULE, DELAYED RELEASE ORAL at 08:01

## 2018-06-05 RX ADMIN — PROPOFOL 550 MG: 10 INJECTION, EMULSION INTRAVENOUS at 18:06

## 2018-06-05 NOTE — PROGRESS NOTES
[facility]    Name:  Wai Medeiros   Age:  42 y.o.  Sex:  male  :  1975  MRN:  [id number]   Visit Number:  [id number]  Admission Date:  6/3/2018  Date Of Service:  18  Primary Care Physician:  Lamine Loyd MD, MD     LOS: 2 days :  Patient Care Team:  Lamine YIN MD as PCP - General (Radiology):      Subjective / Interval History:     Patient had a lengthy pain 3 phase bone scan done yesterday which confirmed osteomyelitis of the right distal phalanx.  Also MRI was done and it confirmed that he still pain and redness is improved and his cellulitis is improving..      Vital Signs:    Temp:  [97.6 °F (36.4 °C)-98.4 °F (36.9 °C)] 97.6 °F (36.4 °C)  Heart Rate:  [61-90] 61  Resp:  [17-20] 17  BP: (107-152)/(61-68) 124/63    Intake and output:    I/O last 3 completed shifts:  In: 1800 [IV Piggyback:1800]  Out: 3400 [Urine:3400]  No intake/output data recorded.    Physical Examination:    General Appearance:    Alert and cooperative, not in any acute distress.   Head:    Atraumatic and normocephalic, without obvious abnormality.   Eyes:            PERRLA,  No pallor. Extra-occular movements are within normal limits.   Neck:   Supple,  No lymphglands, no bruit   Lungs:     Chest shape is normal. Breath sounds heard bilaterally equally.  No crackles or wheezing.     Heart:    Normal S1 and S2, no murmur,  No JVD   Abdomen:     Normal bowel sounds, no masses, no organomegaly. Soft        non-tender, no guarding, no rebound                tenderness   Extremities:   Moves all extremities well, no edema, no cyanosis, Right toe base has a chronic ulcer with drainage or and erythema surrounding is improving   Skin:   Redness erythema and the cellulitis of the right toe is improving    Neurologic:   Grossly non focal and moves all extrimities equally.    Laboratory results:    Results from last 7 days  Lab Units 18  0834 18  0532  06/03/18  1830   SODIUM mmol/L 143 142 139   POTASSIUM mmol/L 4.8 4.6 4.2   CHLORIDE mmol/L 105 106 101   CO2 mmol/L 29.0 27.0 26.0   BUN mg/dL 13 13 12   CREATININE mg/dL 0.70 0.70 0.60   CALCIUM mg/dL 9.5 8.9 9.1   BILIRUBIN mg/dL 0.4 0.4 0.4   ALK PHOS U/L 84 81 96   ALT (SGPT) U/L 41 36 35   AST (SGOT) U/L 41 25 22   GLUCOSE mg/dL 125* 179* 136*       Results from last 7 days  Lab Units 06/05/18  0834 06/04/18  0532 06/03/18  1830   WBC 10*3/mm3 11.11* 15.70* 16.70*   HEMOGLOBIN g/dL 16.8 16.1 17.2   HEMATOCRIT % 50.3 47.3 49.3   PLATELETS 10*3/mm3 238 227 230               Results from last 7 days  Lab Units 06/03/18  1902 06/03/18  1830   BLOODCX   --  No growth at 24 hours   WOUNDCX  Heavy growth (4+) Staphylococcus aureus*  --        Radiology results:    Imaging Results (last 24 hours)     Procedure Component Value Units Date/Time    MRI Foot Right With & Without Contrast [[id number]] Collected:  06/04/18 1938     Updated:  06/04/18 1939    Narrative:       FINAL REPORT    CLINICAL HISTORY:  Cellulitis and acute lymphangitis WOUND ON RT GREAT TOE, BONE  SCAN SHOWED OSTEOMYELITIS.    FINDINGS:  Comparison: Bone scan earlier today  Multiplanar multisequence  imaging of the foot was performed without the intravenous  administration of contrast.  There is moderate edema of the  first digit with cortical erosive changes of the distal phalanx  and abnormal decreased T1 and increased T2 signal consistent  with osteomyelitis.  Remaining bone marrow signal is within  normal limits.  There are multiarticular degenerative changes.  There is moderate circumferential edema.  Impression:   1.  First digit osteomyelitis  2.  Cellulitis      Impression:       Authenticated by Caitlin Ling MD on 06/04/2018 07:38:15 PM    NM Bone Scan 3 Phase [[id number]] Collected:  06/04/18 1413     Updated:  06/04/18 1418    Narrative:       PROCEDURE: NM BONE SCAN 3 PHASE-     HISTORY: Osteomyelitis suspected, foot swelling,  diabetic;  M86.271-Subacute osteomyelitis, right ankle and foot     PROCEDURE: The patient was injected with 26.9 mCi of technetium 99m MDP.  Limited images of the feet were obtained in 3 phases.     COMPARISON: Radiographs dated Hillary 3, 2018.     FINDINGS: There is increased activity within the right great toe on all  3 phases which is consistent with osteomyelitis. There is also activity  within the left great toe on all 3 phases consistent with recent  postsurgical change.     No other abnormal radiotracer activity identified.       Impression:       Findings consistent with osteomyelitis involving the right  great toe.        This report was finalized on 6/4/2018 2:15 PM by Codey Anderson M.D..    US Arterial Doppler Lower Extremity Right [[id number]] Collected:  06/04/18 1111     Updated:  06/04/18 1114    Narrative:       PROCEDURE: US ARTERIAL DOPPLER LOWER EXTREMITY  RIGHT-     HISTORY: osteomyelitis; M86.271-Subacute osteomyelitis, right ankle and  foot     PROCEDURE: Doppler waveform evaluation of the right lower extremity was  performed.     FINDINGS:      RIGHT LOWER EXTREMITY.      Velocities cm/sec :      CFA: 123  SFA Mid: 108   SFA Dist: 118  POP: 99  PT: 60  JODEE: 97  Waveforms are triphasic.          Impression:       No significant stenosis identified.     This report was finalized on 6/4/2018 11:12 AM by Codey Anderson M.D..          I have reviewed the patient's radiology reports.    Medication Review:     I have reviewed the patients active and prn medications.     Assessment:    Principal Problem:    Subacute osteomyelitis of right foot  Active Problems:    Diabetic ulcer of toe of left foot associated with type 2 diabetes mellitus    Cellulitis of left lower extremity    Neuropathy    Hypertension    High cholesterol    Anxiety and depression        Plan:    Patient with osteomyelitis of the right foot with MRSA is currently on broad-spectrum antibiotic and Dr. Antoine is been consulted  to possibly should need a surgery and day he will decided to today about the procedure.    Lamine Loyd MD  06/05/18  9:27 AM      Please note that portions of this note may have been completed with a voice recognition program. Efforts were made to edit the dictations, but occasionally words are mistranscribed.

## 2018-06-05 NOTE — H&P (VIEW-ONLY)
[facility]    Name:  Wai Medeiros   Age:  42 y.o.  Sex:  male  :  1975  MRN:  [id number]   Visit Number:  [id number]  Admission Date:  6/3/2018  Date Of Service:  18  Primary Care Physician:  Lamine Loyd MD, MD     LOS: 2 days :  Patient Care Team:  Lamine YIN MD as PCP - General (Radiology):      Subjective / Interval History:     Patient had a lengthy pain 3 phase bone scan done yesterday which confirmed osteomyelitis of the right distal phalanx.  Also MRI was done and it confirmed that he still pain and redness is improved and his cellulitis is improving..      Vital Signs:    Temp:  [97.6 °F (36.4 °C)-98.4 °F (36.9 °C)] 97.6 °F (36.4 °C)  Heart Rate:  [61-90] 61  Resp:  [17-20] 17  BP: (107-152)/(61-68) 124/63    Intake and output:    I/O last 3 completed shifts:  In: 1800 [IV Piggyback:1800]  Out: 3400 [Urine:3400]  No intake/output data recorded.    Physical Examination:    General Appearance:    Alert and cooperative, not in any acute distress.   Head:    Atraumatic and normocephalic, without obvious abnormality.   Eyes:            PERRLA,  No pallor. Extra-occular movements are within normal limits.   Neck:   Supple,  No lymphglands, no bruit   Lungs:     Chest shape is normal. Breath sounds heard bilaterally equally.  No crackles or wheezing.     Heart:    Normal S1 and S2, no murmur,  No JVD   Abdomen:     Normal bowel sounds, no masses, no organomegaly. Soft        non-tender, no guarding, no rebound                tenderness   Extremities:   Moves all extremities well, no edema, no cyanosis, Right toe base has a chronic ulcer with drainage or and erythema surrounding is improving   Skin:   Redness erythema and the cellulitis of the right toe is improving    Neurologic:   Grossly non focal and moves all extrimities equally.    Laboratory results:    Results from last 7 days  Lab Units 18  0834 18  0532  06/03/18  1830   SODIUM mmol/L 143 142 139   POTASSIUM mmol/L 4.8 4.6 4.2   CHLORIDE mmol/L 105 106 101   CO2 mmol/L 29.0 27.0 26.0   BUN mg/dL 13 13 12   CREATININE mg/dL 0.70 0.70 0.60   CALCIUM mg/dL 9.5 8.9 9.1   BILIRUBIN mg/dL 0.4 0.4 0.4   ALK PHOS U/L 84 81 96   ALT (SGPT) U/L 41 36 35   AST (SGOT) U/L 41 25 22   GLUCOSE mg/dL 125* 179* 136*       Results from last 7 days  Lab Units 06/05/18  0834 06/04/18  0532 06/03/18  1830   WBC 10*3/mm3 11.11* 15.70* 16.70*   HEMOGLOBIN g/dL 16.8 16.1 17.2   HEMATOCRIT % 50.3 47.3 49.3   PLATELETS 10*3/mm3 238 227 230               Results from last 7 days  Lab Units 06/03/18  1902 06/03/18  1830   BLOODCX   --  No growth at 24 hours   WOUNDCX  Heavy growth (4+) Staphylococcus aureus*  --        Radiology results:    Imaging Results (last 24 hours)     Procedure Component Value Units Date/Time    MRI Foot Right With & Without Contrast [[id number]] Collected:  06/04/18 1938     Updated:  06/04/18 1939    Narrative:       FINAL REPORT    CLINICAL HISTORY:  Cellulitis and acute lymphangitis WOUND ON RT GREAT TOE, BONE  SCAN SHOWED OSTEOMYELITIS.    FINDINGS:  Comparison: Bone scan earlier today  Multiplanar multisequence  imaging of the foot was performed without the intravenous  administration of contrast.  There is moderate edema of the  first digit with cortical erosive changes of the distal phalanx  and abnormal decreased T1 and increased T2 signal consistent  with osteomyelitis.  Remaining bone marrow signal is within  normal limits.  There are multiarticular degenerative changes.  There is moderate circumferential edema.  Impression:   1.  First digit osteomyelitis  2.  Cellulitis      Impression:       Authenticated by Caitlin Ling MD on 06/04/2018 07:38:15 PM    NM Bone Scan 3 Phase [[id number]] Collected:  06/04/18 1413     Updated:  06/04/18 1418    Narrative:       PROCEDURE: NM BONE SCAN 3 PHASE-     HISTORY: Osteomyelitis suspected, foot swelling,  diabetic;  M86.271-Subacute osteomyelitis, right ankle and foot     PROCEDURE: The patient was injected with 26.9 mCi of technetium 99m MDP.  Limited images of the feet were obtained in 3 phases.     COMPARISON: Radiographs dated Hillary 3, 2018.     FINDINGS: There is increased activity within the right great toe on all  3 phases which is consistent with osteomyelitis. There is also activity  within the left great toe on all 3 phases consistent with recent  postsurgical change.     No other abnormal radiotracer activity identified.       Impression:       Findings consistent with osteomyelitis involving the right  great toe.        This report was finalized on 6/4/2018 2:15 PM by Codey Anderson M.D..    US Arterial Doppler Lower Extremity Right [[id number]] Collected:  06/04/18 1111     Updated:  06/04/18 1114    Narrative:       PROCEDURE: US ARTERIAL DOPPLER LOWER EXTREMITY  RIGHT-     HISTORY: osteomyelitis; M86.271-Subacute osteomyelitis, right ankle and  foot     PROCEDURE: Doppler waveform evaluation of the right lower extremity was  performed.     FINDINGS:      RIGHT LOWER EXTREMITY.      Velocities cm/sec :      CFA: 123  SFA Mid: 108   SFA Dist: 118  POP: 99  PT: 60  JODEE: 97  Waveforms are triphasic.          Impression:       No significant stenosis identified.     This report was finalized on 6/4/2018 11:12 AM by Codey Anderson M.D..          I have reviewed the patient's radiology reports.    Medication Review:     I have reviewed the patients active and prn medications.     Assessment:    Principal Problem:    Subacute osteomyelitis of right foot  Active Problems:    Diabetic ulcer of toe of left foot associated with type 2 diabetes mellitus    Cellulitis of left lower extremity    Neuropathy    Hypertension    High cholesterol    Anxiety and depression        Plan:    Patient with osteomyelitis of the right foot with MRSA is currently on broad-spectrum antibiotic and Dr. Antoine is been consulted  to possibly should need a surgery and day he will decided to today about the procedure.    Lamine Loyd MD  06/05/18  9:27 AM      Please note that portions of this note may have been completed with a voice recognition program. Efforts were made to edit the dictations, but occasionally words are mistranscribed.

## 2018-06-05 NOTE — ANESTHESIA POSTPROCEDURE EVALUATION
Patient: Wai Medeiros    Procedure Summary     Date:  06/05/18 Room / Location:  [facility] OR  /  [facility]    Anesthesia Start:  1657 Anesthesia Stop:  1810    Procedure:  Right foot Verma arthroplasty, wound debridement with graft application, phl repair (Right Toes) Diagnosis:       Diabetic ulcer of toe of left foot associated with type 2 diabetes mellitus, unspecified ulcer stage      (Diabetic ulcer of toe of left foot associated with type 2 diabetes mellitus, unspecified ulcer stage [E11.621, L97.529])    Surgeon:  Sammy Antoine DPM Provider:  Hong Liz CRNA    Anesthesia Type:  MAC ASA Status:  3          Anesthesia Type: MAC  Last vitals  BP   140/67 (06/05/18 1830)   Temp   98.1 °F (36.7 °C) (06/05/18 1812)   Pulse   69 (06/05/18 1830)   Resp   18 (06/05/18 1830)     SpO2   94 % (06/05/18 1830)     Post Anesthesia Care and Evaluation    Patient location during evaluation: PACU  Patient participation: complete - patient participated  Level of consciousness: awake  Pain score: 3  Pain management: adequate  Airway patency: patent  Anesthetic complications: No anesthetic complications  PONV Status: controlled  Cardiovascular status: acceptable and stable  Respiratory status: acceptable  Hydration status: acceptable

## 2018-06-05 NOTE — NURSING NOTE
Received report from pacu nurseChanda RN at 1851. Patient was transported into room 431 at approx. 1900. Patient is without s/s of distress, family at bedside. Gave report to night shift nurse Tami Chacon RN.

## 2018-06-05 NOTE — BRIEF OP NOTE
TOE ARTHROPLASTY  Progress Note    Wai Medeiros  6/3/2018 - 6/5/2018    Pre-op Diagnosis:   Diabetic ulcer of toe of left foot associated with type 2 diabetes mellitus, unspecified ulcer stage [E11.621, L97.529]       Post-Op Diagnosis Codes:     * Diabetic ulcer of toe of left foot associated with type 2 diabetes mellitus, unspecified ulcer stage [E11.621, L97.529]    Procedure/CPT® Codes:   1.  Right first metatarsophalangeal joint cheilectomy/Verma arthroplasty, CPT code 00225  2.  Right foot wound debridement with graft application, CPT code 59741    Procedure(s):  Right foot Verma arthroplasty, wound debridement with possible graft application, any indicated procedures    Surgeon(s):  Sammy Antoine DPM    Anesthesia: Monitor Anesthesia Care    Staff:   * No surgical staff found *    Estimated Blood Loss: minimal    Urine Voided: * No values recorded between 6/5/2018  4:58 PM and 6/5/2018  6:04 PM *    Specimens:                A: Deep tissue swabs were taken from the right first MTPJ and sent to microbiology.  The excised portion of the base of the proximal phalanx was sent to pathology      Drains:  None    Findings: per dictation    Complications: During the procedure the extensor hallucis longus tendon was inadvertently transected.  It was repaired utilizing 2-0 Vicryl suture as well as a 1.4 mm juggernaut suture anchor      Sammy Antoine DPM     Date: 6/5/2018  Time: 6:16 PM

## 2018-06-05 NOTE — PLAN OF CARE
Problem: Patient Care Overview  Goal: Plan of Care Review  Outcome: Ongoing (interventions implemented as appropriate)   06/05/18 1510   Coping/Psychosocial   Plan of Care Reviewed With patient   Plan of Care Review   Progress improving       Problem: Skin and Soft Tissue Infection (Adult)  Goal: Signs and Symptoms of Listed Potential Problems Will be Absent, Minimized or Managed (Skin and Soft Tissue Infection)  Outcome: Ongoing (interventions implemented as appropriate)      Problem: Wound (Includes Pressure Injury) (Adult)  Goal: Signs and Symptoms of Listed Potential Problems Will be Absent, Minimized or Managed (Wound)  Outcome: Ongoing (interventions implemented as appropriate)

## 2018-06-05 NOTE — ANESTHESIA PREPROCEDURE EVALUATION
Anesthesia Evaluation     Patient summary reviewed and Nursing notes reviewed   no history of anesthetic complications:  NPO Solid Status: > 8 hours  NPO Liquid Status: > 8 hours           Airway   Mallampati: II  TM distance: >3 FB  Neck ROM: full  Possible difficult intubation  Dental - normal exam     Pulmonary    (+) a smoker Current Smoked day of surgery,   Cardiovascular   Exercise tolerance: good (4-7 METS)    ECG reviewed  Rhythm: regular  Rate: normal    (+) hypertension, PVD, hyperlipidemia,     ROS comment: EKG Normal    Neuro/Psych  (+) psychiatric history Anxiety and Depression,     GI/Hepatic/Renal/Endo    (+) obesity, morbid obesity, GERD,  liver disease, diabetes mellitus (fsbs 199 on sliding scale per protocol) type 2 poorly controlled using insulin,     Musculoskeletal     (+) arthralgias, back pain, chronic pain,   Abdominal    Substance History      OB/GYN          Other   (+) arthritis     ROS/Med Hx Other: NPO 8 hours at 1600                  Anesthesia Plan    ASA 3     MAC   (Pt told that intravenous sedation will be used as the primary anesthetic along with local anesthesia if necessary. Every effort will be made to make sure the patient is comfortable.     The patient was told they may or may not have recall for the procedure. It was further explained that if the MAC was not adequate that a general anesthetic with either an LMA or endotracheal tube would be required.   Will proceed with the plan of care.)  intravenous induction   Anesthetic plan and risks discussed with patient.

## 2018-06-05 NOTE — OP NOTE
PREOPERATIVE DIAGNOSIS: Right foot chronic diabetic hallux wound, diabetes, cellulitis, probable early osteomyelitis of the great toe      POSTOPERATIVE DIAGNOSIS: Same    PROCEDURE PERFORMED:  1.  Right foot Verma arthroplasty/cheilectomy of the first MTPJ, CPT code 17008  2.  Right foot wound debridement with graft application, CPT code 36299    SURGEON:  Sammy Antoine DPM    ANESTHESIA:  Mac with local.  Preoperative local consisted of 20 cc of a one-to-one mixture of 0.5% Marcaine and 1% lidocaine plain.    HEMOSTASIS:  Pneumatic right calf tourniquet at 250 mmHg for 44 minutes    EBL:  Minimal    SPECIMENS:  Deep tissue swabs from the first MTPJ were sent to microbiology.  The resected portion of the base of the proximal phalanx was sent to pathology.    COMPLICATIONS:  The extensor hallucis longus tendon was inadvertently transected during the procedure.  It was repaired with 2-0 Vicryl suture and a 1.4 mm juggernaut suture anchor    MATERIALS:  1.4 mm juggernaut suture anchor, 2-0 Vicryl suture, 3-0 Vicryl suture, 3-0 nylon suture.  One 2 x 3 amnio fix graft.  One 4 x 4 amnio fix graft.    INDICATIONS FOR PROCEDURE:  Rhys is a 46-year-old diabetic male whose had a long-standing history of right great toe ulceration secondary to hallux limitus.  I had previously recommended and discussed with him Verma arthroplasty to help heal the wound and offload hallux.  He had this procedure performed successfully on the left foot in the past and had done well.  I recommended this being performed earlier however he had personal trips planned as well as he was running for local election.  Stated he wanted to wait until after he took a trip with his wife to [place] and until that was over before he had any surgery.  He subsequently was admitted over the last weekend for worsening redness and swelling pain fever and constitutional symptoms.  He was admitted by his primary care physician I was consult where he was  evaluated.  He was known to have significant erythema and edema to the first MTPJ and hallux.  X-rays were taken and showed no bony erosions or obvious destruction of the hallux.  3 phase bone scan showed positive uptake on both the right and left foot at the first MTPJ and great toe areas so was nondiagnostic.  He was sent for MRI which showed very early subtle changes in bone marrow edema there most likely suggestive of very early osteomyelitis.  No abscess formation was present.  He did show increased soft tissue swelling and changes consistent with cellulitis.  No obvious bony abscess noted.  No cortical erosions noted on MRI.  No bony fragmenting noted.  On physical exam he is noted to be grossly neurovascularly intact.  Protective sensation was lost.  Pedal hair noted.  CFT brisk.  Still had decreased range of motion of the first MTPJ.  I had ordered MRI on him yesterday evening after discussing with he and his wife.  He also discussed with his primary care physician.  After reviewing the MRI results I discussed with him today preoperatively that my recommendation would be to continue with Verma arthroplasty but did advise him in order to help further treat the wound and infection I would recommend PICC line placement and IV antibiotic therapy and hopefully given the early stage of any osteomyelitis we could treat this without amputation of the toe.  He is in agreement and wish not amputate the toe at this time.  The procedure was discussed in detail multiple times in the past.  All the risks versus benefits and potential complications were discussed.  No guarantees or assurances were given or implied.  Consent was obtained.  All his questions were answered.  All the general risk of surgery including bleeding, worsening infection, spread of infection, nerve damage, pain, deformity, potential need for further surgery or amputation as well as delayed wound healing were discussed.  Anesthetic complications  including DVT, PE, heart attack, stroke and death were also discussed.  The correct surgical side was marked preoperatively.  He has been receiving broad spectrum antibiotics per the primary team.  DESCRIPTION OF PROCEDURE:    The patient was brought to the operating room and placed from operative table in the supine position.  A calf tourniquet was placed about the right calf.  The right foot was anesthetized with 20 cc of a one-to-one mixture 0.5% Marcaine and 1% lidocaine plain.  Right foot was scrubbed, prepped, and draped in the usual aseptic manner.  A timeout was performed identifying the correct surgical patient, procedure, side.  Right foot was elevated, exsanguinated and the pneumatic calf tourniquet was inflated to 250 mmHg.  Attention was directed to the dorsal aspect of the right first MTPJ where a 5 cm linear incision was made medial and parallel to the extensor hallucis longus tendon.  Incision was deepened down to subcutaneous tissue using sharp and blunt dissection.  All bleeders are cauterized necessary.  Care was taken to identify and retract all neurovascular structures.  Extensor hallucis longest tendon was retracted laterally.  A 15 blade was used to perform full-thickness capsulotomy over the dorsal first MTPJ where the periosteum and capsule were reflected exposing the first metatarsophalangeal joint.  A power bone saw was then used to resect bony prominence off the first metatarsal head dorsally and medially.  Bone that was resected was passed from the operative field.  Power bone saw was then used to resect the base of the proximal phalanx which was passed off the operative field and sent to pathology.  The wound was irrigated with copious amounts of saline.  Deep tissue swabs were taken from the first MTPJ and sent to microbiology as well.  Once the incision was copiously irrigated any rough bone edges on the first metatarsal head were removed and smoothed with a bone rongeur area a 4 x 4  centimeters epi fix mesh graft was cut into and placed over the exposed bone and joint and the capsule was repaired with 2-0 Vicryl in a running interlocking suture fashion.  While repairing the dorsal capsule it was noted that the extensor hallucis longus tendon had been transected roughly over the area of the proximal phalanx.  The tendon was identified and isolated and repaired utilizing 2-0 Vicryl and a running interlocking suture fashion.  To help reinforce the repair as well as distal insertion a 1.4 mm juggernaut suture anchor was placed in the dorsal aspect of the proximal phalanx and the supplied max braid suture was used to further imbricate and reinforce the repair.  The foot was loaded multiple times and significant amounts of improved and increased dorsiflexion of the hallux were noted.  The remaining portion of the 4 x 4 amnio fix graft was wrapped around the tendon repair for reinforcement and inflammation.  The subcutaneous tissue was repaired then with 3-0 Vicryl in a simple interrupted suture fashion.  Skin was closed with 3-0 nylon in a horizontal mattress suture fashion.  Attention was then directed to the plantar medial aspect of the hallux where a 15 blade was used to perform full-thickness excisional wound debridement of the plantar medial hallux wound down into the subcutaneous tissue layer.  The wound/blistered area was now noted to encompass the entire medial and plantar aspect of the great toe and extended into the interspace.  The entire layer of dermis and epidermis was removed and the majority portion of the full-thickness ulceration was noted plantar medially about the IPJ where it measured 1.5 x 1.  I was able to remove all the dead devitalized blistered tissue and hyperkeratotic tissue and a healthy bleeding wound bed was stimulated.  The wound was then cleaned and an additional 2 x 3 amnio fix graft was placed over the wound and dressed with Adaptic, 4 x 4's, Kerlix, Ace wrap.  The  pneumatic calf tourniquet was deflated and a prompt hyperemic response noted to all digits of the right foot.  The patient tolerated the procedure and anesthesia well.  He was transferred from operating room to the recovery room with vital signs stable and neurovascular status intact to the right foot.

## 2018-06-06 PROBLEM — M86.171 OSTEOMYELITIS OF ANKLE OR FOOT, ACUTE, RIGHT: Status: ACTIVE | Noted: 2018-06-06

## 2018-06-06 LAB
ALBUMIN SERPL-MCNC: 3.9 G/DL (ref 3.5–5)
ALBUMIN/GLOB SERPL: 1.4 G/DL (ref 1–2)
ALP SERPL-CCNC: 76 U/L (ref 38–126)
ALT SERPL W P-5'-P-CCNC: 42 U/L (ref 13–69)
ANION GAP SERPL CALCULATED.3IONS-SCNC: 18.1 MMOL/L (ref 10–20)
AST SERPL-CCNC: 25 U/L (ref 15–46)
BACTERIA SPEC AEROBE CULT: ABNORMAL
BACTERIA SPEC AEROBE CULT: ABNORMAL
BASOPHILS # BLD AUTO: 0.05 10*3/MM3 (ref 0–0.2)
BASOPHILS NFR BLD AUTO: 0.3 % (ref 0–2.5)
BILIRUB SERPL-MCNC: 0.4 MG/DL (ref 0.2–1.3)
BUN BLD-MCNC: 16 MG/DL (ref 7–20)
BUN/CREAT SERPL: 26.7 (ref 6.3–21.9)
CALCIUM SPEC-SCNC: 8.6 MG/DL (ref 8.4–10.2)
CHLORIDE SERPL-SCNC: 102 MMOL/L (ref 98–107)
CO2 SERPL-SCNC: 27 MMOL/L (ref 26–30)
CREAT BLD-MCNC: 0.6 MG/DL (ref 0.6–1.3)
DEPRECATED RDW RBC AUTO: 39.9 FL (ref 37–54)
EOSINOPHIL # BLD AUTO: 0.05 10*3/MM3 (ref 0–0.7)
EOSINOPHIL NFR BLD AUTO: 0.3 % (ref 0–7)
ERYTHROCYTE [DISTWIDTH] IN BLOOD BY AUTOMATED COUNT: 12 % (ref 11.5–14.5)
GFR SERPL CREATININE-BSD FRML MDRD: 149 ML/MIN/1.73
GLOBULIN UR ELPH-MCNC: 2.8 GM/DL
GLUCOSE BLD-MCNC: 216 MG/DL (ref 74–98)
GLUCOSE BLDC GLUCOMTR-MCNC: 201 MG/DL (ref 70–130)
GLUCOSE BLDC GLUCOMTR-MCNC: 220 MG/DL (ref 70–130)
GLUCOSE BLDC GLUCOMTR-MCNC: 237 MG/DL (ref 70–130)
GLUCOSE BLDC GLUCOMTR-MCNC: 303 MG/DL (ref 70–130)
HCT VFR BLD AUTO: 46 % (ref 42–52)
HGB BLD-MCNC: 15.7 G/DL (ref 14–18)
IMM GRANULOCYTES # BLD: 0.08 10*3/MM3 (ref 0–0.06)
IMM GRANULOCYTES NFR BLD: 0.5 % (ref 0–0.6)
LYMPHOCYTES # BLD AUTO: 2.7 10*3/MM3 (ref 0.6–3.4)
LYMPHOCYTES NFR BLD AUTO: 18.3 % (ref 10–50)
MCH RBC QN AUTO: 30.7 PG (ref 27–31)
MCHC RBC AUTO-ENTMCNC: 34.1 G/DL (ref 30–37)
MCV RBC AUTO: 89.8 FL (ref 80–94)
MONOCYTES # BLD AUTO: 0.68 10*3/MM3 (ref 0–0.9)
MONOCYTES NFR BLD AUTO: 4.6 % (ref 0–12)
NEUTROPHILS # BLD AUTO: 11.18 10*3/MM3 (ref 2–6.9)
NEUTROPHILS NFR BLD AUTO: 76 % (ref 37–80)
NRBC BLD MANUAL-RTO: 0 /100 WBC (ref 0–0)
PLATELET # BLD AUTO: 261 10*3/MM3 (ref 130–400)
PMV BLD AUTO: 9.9 FL (ref 6–12)
POTASSIUM BLD-SCNC: 4.1 MMOL/L (ref 3.5–5.1)
PROT SERPL-MCNC: 6.7 G/DL (ref 6.3–8.2)
RBC # BLD AUTO: 5.12 10*6/MM3 (ref 4.7–6.1)
SODIUM BLD-SCNC: 143 MMOL/L (ref 137–145)
STREP GROUPING: ABNORMAL
WBC NRBC COR # BLD: 14.74 10*3/MM3 (ref 4.8–10.8)

## 2018-06-06 PROCEDURE — 25010000002 PIPERACILLIN SOD-TAZOBACTAM PER 1 G: Performed by: INTERNAL MEDICINE

## 2018-06-06 PROCEDURE — C1894 INTRO/SHEATH, NON-LASER: HCPCS

## 2018-06-06 PROCEDURE — C1751 CATH, INF, PER/CENT/MIDLINE: HCPCS

## 2018-06-06 PROCEDURE — 63710000001 INSULIN ASPART PER 5 UNITS: Performed by: INTERNAL MEDICINE

## 2018-06-06 PROCEDURE — 99024 POSTOP FOLLOW-UP VISIT: CPT | Performed by: PODIATRIST

## 2018-06-06 PROCEDURE — 25010000002 VANCOMYCIN PER 500 MG: Performed by: INTERNAL MEDICINE

## 2018-06-06 PROCEDURE — 85025 COMPLETE CBC W/AUTO DIFF WBC: CPT | Performed by: INTERNAL MEDICINE

## 2018-06-06 PROCEDURE — 80053 COMPREHEN METABOLIC PANEL: CPT | Performed by: INTERNAL MEDICINE

## 2018-06-06 PROCEDURE — 82962 GLUCOSE BLOOD TEST: CPT

## 2018-06-06 RX ORDER — SODIUM CHLORIDE 0.9 % (FLUSH) 0.9 %
20 SYRINGE (ML) INJECTION AS NEEDED
Status: DISCONTINUED | OUTPATIENT
Start: 2018-06-06 | End: 2018-06-07 | Stop reason: HOSPADM

## 2018-06-06 RX ADMIN — VANCOMYCIN HYDROCHLORIDE 1500 MG: 500 INJECTION, POWDER, LYOPHILIZED, FOR SOLUTION INTRAVENOUS at 01:01

## 2018-06-06 RX ADMIN — PREGABALIN 150 MG: 75 CAPSULE ORAL at 20:55

## 2018-06-06 RX ADMIN — OXYCODONE HYDROCHLORIDE 10 MG: 10 TABLET, FILM COATED, EXTENDED RELEASE ORAL at 22:50

## 2018-06-06 RX ADMIN — OXYCODONE HYDROCHLORIDE AND ACETAMINOPHEN 1 TABLET: 10; 325 TABLET ORAL at 20:55

## 2018-06-06 RX ADMIN — PREGABALIN 150 MG: 75 CAPSULE ORAL at 09:20

## 2018-06-06 RX ADMIN — IBUPROFEN 200 MG: 200 TABLET, FILM COATED ORAL at 12:03

## 2018-06-06 RX ADMIN — OXYCODONE HYDROCHLORIDE 10 MG: 10 TABLET, FILM COATED, EXTENDED RELEASE ORAL at 14:34

## 2018-06-06 RX ADMIN — OXYCODONE HYDROCHLORIDE AND ACETAMINOPHEN 1 TABLET: 10; 325 TABLET ORAL at 06:35

## 2018-06-06 RX ADMIN — MAGNESIUM HYDROXIDE 10 ML: 2400 SUSPENSION ORAL at 01:07

## 2018-06-06 RX ADMIN — BUPROPION HYDROCHLORIDE 150 MG: 150 TABLET, FILM COATED, EXTENDED RELEASE ORAL at 06:35

## 2018-06-06 RX ADMIN — TAZOBACTAM SODIUM AND PIPERACILLIN SODIUM 3.38 G: 375; 3 INJECTION, SOLUTION INTRAVENOUS at 09:20

## 2018-06-06 RX ADMIN — INSULIN ASPART 4 UNITS: 100 INJECTION, SOLUTION INTRAVENOUS; SUBCUTANEOUS at 23:20

## 2018-06-06 RX ADMIN — GLIPIZIDE 10 MG: 5 TABLET ORAL at 06:35

## 2018-06-06 RX ADMIN — IBUPROFEN 200 MG: 200 TABLET, FILM COATED ORAL at 01:06

## 2018-06-06 RX ADMIN — TAZOBACTAM SODIUM AND PIPERACILLIN SODIUM 3.38 G: 375; 3 INJECTION, SOLUTION INTRAVENOUS at 01:01

## 2018-06-06 RX ADMIN — INSULIN ASPART 4 UNITS: 100 INJECTION, SOLUTION INTRAVENOUS; SUBCUTANEOUS at 17:15

## 2018-06-06 RX ADMIN — INSULIN ASPART 4 UNITS: 100 INJECTION, SOLUTION INTRAVENOUS; SUBCUTANEOUS at 06:37

## 2018-06-06 RX ADMIN — DULOXETINE HYDROCHLORIDE 60 MG: 30 CAPSULE, DELAYED RELEASE ORAL at 09:19

## 2018-06-06 RX ADMIN — INSULIN ASPART 7 UNITS: 100 INJECTION, SOLUTION INTRAVENOUS; SUBCUTANEOUS at 11:57

## 2018-06-06 RX ADMIN — OXYCODONE HYDROCHLORIDE 10 MG: 10 TABLET, FILM COATED, EXTENDED RELEASE ORAL at 06:35

## 2018-06-06 RX ADMIN — VANCOMYCIN HYDROCHLORIDE 1500 MG: 500 INJECTION, POWDER, LYOPHILIZED, FOR SOLUTION INTRAVENOUS at 11:12

## 2018-06-06 RX ADMIN — LISINOPRIL 40 MG: 20 TABLET ORAL at 09:20

## 2018-06-06 NOTE — PLAN OF CARE
Problem: Patient Care Overview  Goal: Plan of Care Review  Outcome: Ongoing (interventions implemented as appropriate)    Goal: Individualization and Mutuality  Outcome: Ongoing (interventions implemented as appropriate)    Goal: Discharge Needs Assessment  Outcome: Ongoing (interventions implemented as appropriate)    Goal: Interprofessional Rounds/Family Conf  Outcome: Ongoing (interventions implemented as appropriate)      Problem: Skin and Soft Tissue Infection (Adult)  Goal: Signs and Symptoms of Listed Potential Problems Will be Absent, Minimized or Managed (Skin and Soft Tissue Infection)  Outcome: Ongoing (interventions implemented as appropriate)      Problem: Wound (Includes Pressure Injury) (Adult)  Goal: Signs and Symptoms of Listed Potential Problems Will be Absent, Minimized or Managed (Wound)  Outcome: Ongoing (interventions implemented as appropriate)

## 2018-06-06 NOTE — PROGRESS NOTES
Patient Care Team:  Lamine YIN MD as PCP - General (Radiology)    Chief complaint 1 day sp rt foot verma arthroplasty\1st mpj cheilectomy, wound debridement and graft    Subjective .   A status post Verma arthroplasty/first MTPJ cheilectomy of the right foot with wound debridement and graft application.  Patient seen at bedside today with his family.  No complaints of pain.  Denies nausea and vomiting.  Denies any other constitutional symptoms.      Review of Systems  All systems were reviewed and negative except for chief complaint.    History  Past Medical History:   Diagnosis Date   • Anxiety and depression    • Constipation    • Depression    • Diabetes mellitus    • Fatty liver    • GERD (gastroesophageal reflux disease)    • High cholesterol    • History of methicillin resistant staphylococcus aureus (MRSA) 2013    RIGHT THIGH   • Hypertension    • Injury of back    • Neuropathy    • Pancreatitis    • Wears glasses    ,   Past Surgical History:   Procedure Laterality Date   • APPENDECTOMY     • BACK SURGERY      L4-5 LAMINECTOMY   • CHOLECYSTECTOMY     • FOOT ARTHROPLASTY Left 4/2/2018    Procedure: Left foot Verma arthroplasty, wound debridement and grafting;  Surgeon: Sammy Antoine DPM;  Location: [facility];  Service: Orthopedics   • FOOT IRRIGATION, DEBRIDEMENT AND REPAIR Left 1/6/2018    Procedure: Left foot irrigation and debridement, wound closure, graft application;  Surgeon: Sammy Antoine DPM;  Location: [facility] OR;  Service:    • INCISION AND DRAINAGE FOOT Left 1/3/2018    Procedure: INCISION AND DRAINAGE FOOT ABSCESS, GREAT TOE;  Surgeon: Sammy Antoine DPM;  Location: [facility] OR;  Service:    • SCROTAL SURGERY     • TOE ARTHROPLASTY Right 6/5/2018    Procedure: Right foot Verma arthroplasty, wound debridement with graft application, phl repair;  Surgeon: Sammy Antoine DPM;  Location: [facility] OR;  Service: Orthopedics   • WISDOM TOOTH EXTRACTION     ,   Family History   Problem  Relation Age of Onset   • Diabetes Mother    • COPD Mother    • Diabetes Father    ,   Social History   Substance Use Topics   • Smoking status: Current Every Day Smoker     Packs/day: 1.00     Types: Cigarettes   • Smokeless tobacco: Current User     Types: Snuff   • Alcohol use No   ,   Prescriptions Prior to Admission   Medication Sig Dispense Refill Last Dose   • Canagliflozin (INVOKANA) 300 MG tablet Take 300 mg by mouth Daily.   6/3/2018 at Unknown time   • DULoxetine (CYMBALTA) 60 MG capsule Take 60 mg by mouth Daily.   6/3/2018 at Unknown time   • furosemide (LASIX) 40 MG tablet Take 40 mg by mouth Daily.   Past Month at Unknown time   • glimepiride (AMARYL) 4 MG tablet Take 8 mg by mouth Every Morning Before Breakfast.   6/3/2018 at Unknown time   • lisinopril (PRINIVIL,ZESTRIL) 40 MG tablet Take 40 mg by mouth Daily.   6/3/2018 at 0800   • LYRICA 150 MG capsule Take 1 tablet by mouth three times daily  0 6/3/2018 at 0800   • metFORMIN (GLUCOPHAGE) 1000 MG tablet Take 1,000 mg by mouth 2 (Two) Times a Day.  3 6/3/2018 at 0800   • oxyCODONE (oxyCONTIN) 10 MG 12 hr tablet Take 10 mg by mouth Every 12 (Twelve) Hours.   6/3/2018 at Unknown time   • OXYCONTIN 15 MG tablet extended-release 12 hour Take 15 mg by mouth Every 12 (Twelve) Hours.   6/3/2018 at Unknown time   • acetaminophen (TYLENOL 8 HOUR) 650 MG 8 hr tablet Take 1 tablet by mouth Every 8 (Eight) Hours As Needed for Mild Pain . 12 tablet 0 Unknown at Unknown time   • HYDROcodone-acetaminophen (NORCO) 5-325 MG per tablet Take 1-2 tablets by mouth Every 4 (Four) Hours As Needed for pain. (Patient not taking: Reported on 6/4/2018) 20 tablet 0 Not Taking at Unknown time   • ibuprofen (ADVIL,MOTRIN) 200 MG tablet Take 200 mg by mouth Every 6 (Six) Hours As Needed for Mild Pain .   Unknown at Unknown time   • ondansetron ODT (ZOFRAN-ODT) 4 MG disintegrating tablet Take 1 tablet by mouth Every 8 (Eight) Hours As Needed for Nausea or Vomiting. (Patient not  "taking: Reported on 6/4/2018) 8 tablet 0 Not Taking at Unknown time   • simvastatin (ZOCOR) 40 MG tablet Take 40 mg by mouth every night at bedtime.  3 6/2/2018 at 2200   • sulfamethoxazole-trimethoprim (BACTRIM DS) 800-160 MG per tablet Take 1 tablet by mouth 2 (Two) Times a Day. (Patient not taking: Reported on 6/4/2018) 20 tablet 1 Not Taking at Unknown time   , Scheduled Meds:      buPROPion  mg Oral QAM   [START ON 6/7/2018] ceftriaxone 2 g Intravenous Q24H   DULoxetine 60 mg Oral Daily   glipiZIDE 10 mg Oral QAM AC   insulin aspart 0-9 Units Subcutaneous 4x Daily AC & at Bedtime   lisinopril 40 mg Oral Daily   [START ON 6/7/2018] metFORMIN 1,000 mg Oral BID With Meals   oxyCODONE 10 mg Oral Q8H   pregabalin 150 mg Oral BID   , Continuous Infusions:      sodium chloride 100 mL/hr Last Rate: 100 mL/hr (06/05/18 0935)   , PRN Meds:  •  acetaminophen  •  dextrose  •  dextrose  •  glucagon (human recombinant)  •  ibuprofen  •  magnesium hydroxide  •  ondansetron ODT  •  oxyCODONE-acetaminophen  •  Insert peripheral IV **AND** sodium chloride  •  sodium chloride and Allergies:  Patient has no known allergies.    Objective     Vital Signs   /68 (BP Location: Right arm, Patient Position: Lying)   Pulse 72   Temp 98.4 °F (36.9 °C) (Oral)   Resp 17   Ht 185.4 cm (73\")   Wt 118 kg (260 lb 8 oz)   SpO2 100%   BMI 34.37 kg/m²     Physical Exam:AAO ×3, NAD, AF VSS  Dressing intact to the right foot.  Capillary refill time to the exposed digits is brisk.  He is able to plantarflex and dorsiflex the ankle and digits.  There is no streaking or cellulitis proximal to the dressing.  Calf is soft and nontender.  There is decreased erythema and edema to the right great toe.  Dressing has bloody strikethrough plantarly where the wound was debrided.       Results Review: Wound culture results from 6/3/18 have grown staph aureus and streptococcus.  My guess is the staph will convert to MRSA.  All the other labs " reviewed.  White count still elevated at 14.    Assessment/Plan   42-year-old diabetic male 1 day status post Verma arthroplasty of the right foot first MTPJ with wound debridement and grafting  Principal Problem:    Osteomyelitis of ankle or foot, acute, right  Active Problems:    Diabetic ulcer of toe of left foot associated with type 2 diabetes mellitus    Cellulitis of left lower extremity    Subacute osteomyelitis of right foot    Neuropathy    Hypertension    High cholesterol    Anxiety and depression  His PICC line has been placed.  It is of my opinion that the staph aureus will most likely convert to MRSA with final cultures.  At this point I would think personally that daptomycin once daily IV upon discharge would be acceptable but we will leave this to his primary care physician for definitive treatment.  Given adequate antibiotic therapy along with wound care his wound should heal at this point.  I will change his dressing tomorrow.  Once definitive antibiotic therapy is decided he'll be cleared for discharge.  No further surgical intervention is planned at this point.  He can be discharged when felt acceptable by his primary care physician.  He will continue to keep the right foot clean and dry.  Weight-bear as tolerated with the fracture boot.  Follow-up with me in the office upon discharge.      Sammy Antoine DPM  06/06/18  5:42 PM

## 2018-06-06 NOTE — PROGRESS NOTES
Patient: Wai Medeiros  Procedure(s):  Right foot Verma arthroplasty, wound debridement with graft application, phl repair  Anesthesia type: [unfilled]    Patient location: [facility] Surgical Floor  Last vitals:   Vitals:    06/06/18 1051   BP: 126/74   Pulse: 70   Resp: 18   Temp: 98.1 °F (36.7 °C)   SpO2: 100%     Level of consciousness: awake, alert and oriented    Post-anesthesia pain: adequate analgesia  Airway patency: patent  Respiratory: unassisted  Cardiovascular: stable and blood pressure at baseline  Hydration: euvolemic    Anesthetic complications: no

## 2018-06-06 NOTE — PLAN OF CARE
Problem: Patient Care Overview  Goal: Plan of Care Review  Outcome: Ongoing (interventions implemented as appropriate)   06/06/18 0320   Coping/Psychosocial   Plan of Care Reviewed With patient   Plan of Care Review   Progress improving   OTHER   Outcome Summary S/P surgery on Right foot on 6/5/18, plan for PICC line on 6/6/18.

## 2018-06-06 NOTE — PROGRESS NOTES
[facility]    Name:  Wai Medeiros   Age:  42 y.o.  Sex:  male  :  1975  MRN:  [id number]   Visit Number:  [id number]  Admission Date:  6/3/2018  Date Of Service:  18  Primary Care Physician:  Lamine Loyd MD, MD     LOS: 3 days :  Patient Care Team:  Lamine YIN MD as PCP - General (Radiology):      Subjective / Interval History:     Patient status post her debridement of the right the toe for osteomyelitis done by Dr. Antoine.  The patient to is a not having much pain.  There was some postoperative bleeding and the dressing is dry blood noted no fever.  The Dr. Antoine has ordered for a PICC line.      Vital Signs:    Temp:  [97.5 °F (36.4 °C)-98.7 °F (37.1 °C)] 97.7 °F (36.5 °C)  Heart Rate:  [64-84] 68  Resp:  [17-20] 19  BP: (116-140)/(66-75) 123/70    Intake and output:    I/O last 3 completed shifts:  In: 1450 [I.V.:900; IV Piggyback:550]  Out: 2805 [Urine:2800; Blood:5]  No intake/output data recorded.    Physical Examination:    General Appearance:    Alert and cooperative, not in any acute distress.   Head:    Atraumatic and normocephalic, without obvious abnormality.   Eyes:            PERRLA,  No pallor. Extra-occular movements are within normal limits.   Neck:   Supple,  No lymphglands, no bruit   Lungs:     Chest shape is normal. Breath sounds heard bilaterally equally.  No crackles or wheezing.     Heart:    Normal S1 and S2, no murmur,  No JVD   Abdomen:     Normal bowel sounds, no masses, no organomegaly. Soft        non-tender, no guarding, no rebound                tenderness   Extremities:   Moves all extremities well, no edema, no cyanosis, The dressing is done on the right foot toe with some dried blood noted    Skin:   No  bruising or rash.   Neurologic:   Grossly non focal and moves all extrimities equally.    Laboratory results:    Results from last 7 days  Lab Units 18  0513 18  0834 18  0532    SODIUM mmol/L 143 143 142   POTASSIUM mmol/L 4.1 4.8 4.6   CHLORIDE mmol/L 102 105 106   CO2 mmol/L 27.0 29.0 27.0   BUN mg/dL 16 13 13   CREATININE mg/dL 0.60 0.70 0.70   CALCIUM mg/dL 8.6 9.5 8.9   BILIRUBIN mg/dL 0.4 0.4 0.4   ALK PHOS U/L 76 84 81   ALT (SGPT) U/L 42 41 36   AST (SGOT) U/L 25 41 25   GLUCOSE mg/dL 216* 125* 179*       Results from last 7 days  Lab Units 06/06/18  0513 06/05/18  0834 06/04/18  0532   WBC 10*3/mm3 14.74* 11.11* 15.70*   HEMOGLOBIN g/dL 15.7 16.8 16.1   HEMATOCRIT % 46.0 50.3 47.3   PLATELETS 10*3/mm3 261 238 227               Results from last 7 days  Lab Units 06/03/18  1902 06/03/18  1830   BLOODCX   --  No growth at 2 days   WOUNDCX  Heavy growth (4+) Staphylococcus aureus*  Streptococcus agalactiae (Group B)*  --        Radiology results:    Imaging Results (last 24 hours)     Procedure Component Value Units Date/Time    SCANNED - IMAGING [[id number]] Resulted:  06/03/18      Updated:  06/06/18 0811          I have reviewed the patient's radiology reports.    Medication Review:     I have reviewed the patients active and prn medications.     Assessment:    Principal Problem:    Osteomyelitis of ankle or foot, acute, right  Active Problems:    Subacute osteomyelitis of right foot    Diabetic ulcer of toe of left foot associated with type 2 diabetes mellitus    Cellulitis of left lower extremity    Neuropathy    Hypertension    High cholesterol    Anxiety and depression          Plan:    Right to the distal phalanx osteomyelitis is status post procedure done with debridement by Dr. Antoine.  The plan would be for a PICC line placement with IV antibiotic and see if there is no improvement after the IV antibiotic treatment and further amputation may be needed.  The will further closely follow up as an outpatient.  Will try to get the does the needed as per peak and trough her and the ICU for discharge planning and home IV antibiotic could be done.    Lamine Loyd,  MD  06/06/18  9:25 AM      Please note that portions of this note may have been completed with a voice recognition program. Efforts were made to edit the dictations, but occasionally words are mistranscribed.

## 2018-06-07 VITALS
BODY MASS INDEX: 34.19 KG/M2 | HEART RATE: 60 BPM | RESPIRATION RATE: 18 BRPM | HEIGHT: 73 IN | TEMPERATURE: 97.4 F | SYSTOLIC BLOOD PRESSURE: 114 MMHG | DIASTOLIC BLOOD PRESSURE: 76 MMHG | OXYGEN SATURATION: 98 % | WEIGHT: 258 LBS

## 2018-06-07 LAB
GLUCOSE BLDC GLUCOMTR-MCNC: 163 MG/DL (ref 70–130)
GLUCOSE BLDC GLUCOMTR-MCNC: 178 MG/DL (ref 70–130)
GLUCOSE BLDC GLUCOMTR-MCNC: 218 MG/DL (ref 70–130)

## 2018-06-07 PROCEDURE — 63710000001 INSULIN ASPART PER 5 UNITS: Performed by: INTERNAL MEDICINE

## 2018-06-07 PROCEDURE — 82962 GLUCOSE BLOOD TEST: CPT

## 2018-06-07 PROCEDURE — 25010000002 CEFTRIAXONE PER 250 MG: Performed by: INTERNAL MEDICINE

## 2018-06-07 RX ADMIN — METFORMIN HYDROCHLORIDE 1000 MG: 500 TABLET, FILM COATED ORAL at 10:03

## 2018-06-07 RX ADMIN — OXYCODONE HYDROCHLORIDE AND ACETAMINOPHEN 1 TABLET: 10; 325 TABLET ORAL at 10:03

## 2018-06-07 RX ADMIN — OXYCODONE HYDROCHLORIDE AND ACETAMINOPHEN 1 TABLET: 10; 325 TABLET ORAL at 13:44

## 2018-06-07 RX ADMIN — CEFTRIAXONE 2 G: 2 INJECTION, SOLUTION INTRAVENOUS at 13:00

## 2018-06-07 RX ADMIN — GLIPIZIDE 10 MG: 5 TABLET ORAL at 06:30

## 2018-06-07 RX ADMIN — OXYCODONE HYDROCHLORIDE 10 MG: 10 TABLET, FILM COATED, EXTENDED RELEASE ORAL at 13:48

## 2018-06-07 RX ADMIN — DULOXETINE HYDROCHLORIDE 60 MG: 30 CAPSULE, DELAYED RELEASE ORAL at 10:02

## 2018-06-07 RX ADMIN — Medication 10 ML: at 06:35

## 2018-06-07 RX ADMIN — INSULIN ASPART 4 UNITS: 100 INJECTION, SOLUTION INTRAVENOUS; SUBCUTANEOUS at 13:45

## 2018-06-07 RX ADMIN — PREGABALIN 150 MG: 75 CAPSULE ORAL at 10:03

## 2018-06-07 RX ADMIN — LISINOPRIL 40 MG: 20 TABLET ORAL at 10:04

## 2018-06-07 RX ADMIN — OXYCODONE HYDROCHLORIDE 10 MG: 10 TABLET, FILM COATED, EXTENDED RELEASE ORAL at 06:30

## 2018-06-07 NOTE — PLAN OF CARE
Problem: Patient Care Overview  Goal: Plan of Care Review  Outcome: Ongoing (interventions implemented as appropriate)   06/06/18 2100   Coping/Psychosocial   Plan of Care Reviewed With patient   Plan of Care Review   Progress improving   OTHER   Outcome Summary pleasant patient with complaint of pain-medications given per physician's orders--monitor labs and continue to monitor patient-possible discharge 06-07-18     Goal: Individualization and Mutuality  Outcome: Ongoing (interventions implemented as appropriate)    Goal: Discharge Needs Assessment  Outcome: Ongoing (interventions implemented as appropriate)    Goal: Interprofessional Rounds/Family Conf  Outcome: Ongoing (interventions implemented as appropriate)      Problem: Skin and Soft Tissue Infection (Adult)  Goal: Signs and Symptoms of Listed Potential Problems Will be Absent, Minimized or Managed (Skin and Soft Tissue Infection)  Outcome: Ongoing (interventions implemented as appropriate)      Problem: Wound (Includes Pressure Injury) (Adult)  Goal: Signs and Symptoms of Listed Potential Problems Will be Absent, Minimized or Managed (Wound)  Outcome: Ongoing (interventions implemented as appropriate)      Problem: Pain, Acute (Adult)  Goal: Identify Related Risk Factors and Signs and Symptoms  Outcome: Outcome(s) achieved Date Met: 06/07/18    Goal: Acceptable Pain Control/Comfort Level  Outcome: Ongoing (interventions implemented as appropriate)      Problem: Fall Risk (Adult)  Goal: Identify Related Risk Factors and Signs and Symptoms  Outcome: Outcome(s) achieved Date Met: 06/07/18    Goal: Absence of Fall  Outcome: Ongoing (interventions implemented as appropriate)

## 2018-06-07 NOTE — PROGRESS NOTES
Continued Stay Note  VERONICA Hernandez     Patient Name: Wai Medeiros  MRN: [id number]  Today's Date: 6/7/2018    Admit Date: 6/3/2018          Discharge Plan     Row Name 06/07/18 1011       Plan    Plan Home home health     Patient/Family in Agreement with Plan yes    Plan Comments Informed Katty at [facility] and Berenice at [facility] they will bring the ABX to the hospital room               Discharge Codes    No documentation.       Expected Discharge Date and Time     Expected Discharge Date Expected Discharge Time    Jun 7, 2018             Katt Blood

## 2018-06-07 NOTE — PROGRESS NOTES
Case Management Discharge Note         Destination     No service coordination in this encounter.      Durable Medical Equipment     No service coordination in this encounter.      Dialysis/Infusion - Selection Complete     Service Request Status Selected Specialties Address Phone Number Fax Number    [facility] Selected Infusion Therapy [address], [facility] [address] [phone]      Home Medical Care - Selection Complete     Service Request Status Selected Specialties Address Phone Number Fax Number    [facility] Selected Home Health Services [address], [facility] [id number] [phone]      Social Care     No service coordination in this encounter.        Other:  (private car )    Final Discharge Disposition Code: 06 - home with home health care

## 2018-06-08 ENCOUNTER — TELEPHONE (OUTPATIENT)
Dept: ORTHOPEDIC SURGERY | Facility: CLINIC | Age: 43
End: 2018-06-08

## 2018-06-08 LAB — BACTERIA SPEC AEROBE CULT: NORMAL

## 2018-06-08 RX ORDER — HYDROCODONE BITARTRATE AND ACETAMINOPHEN 7.5; 325 MG/1; MG/1
1 TABLET ORAL EVERY 6 HOURS PRN
Qty: 30 TABLET | Refills: 0 | Status: SHIPPED | OUTPATIENT
Start: 2018-06-08 | End: 2019-05-28

## 2018-06-09 LAB
BACTERIA SPEC AEROBE CULT: ABNORMAL
BACTERIA SPEC ANAEROBE CULT: NORMAL
LAB AP CASE REPORT: NORMAL
Lab: NORMAL
PATH REPORT.FINAL DX SPEC: NORMAL

## 2018-06-11 NOTE — TELEPHONE ENCOUNTER
Tried to call patient to verify he picked up norco at his pharmacy, no answer and no way to leave message. I verified with pharmacy and he has picked up.

## 2018-06-14 ENCOUNTER — LAB REQUISITION (OUTPATIENT)
Dept: LAB | Facility: HOSPITAL | Age: 43
End: 2018-06-14

## 2018-06-14 DIAGNOSIS — E11.69 TYPE 2 DIABETES MELLITUS WITH OTHER SPECIFIED COMPLICATION (HCC): ICD-10-CM

## 2018-06-14 DIAGNOSIS — M86.171 OTHER ACUTE OSTEOMYELITIS, RIGHT ANKLE AND FOOT (HCC): ICD-10-CM

## 2018-06-14 LAB
ALBUMIN SERPL-MCNC: 4 G/DL (ref 3.5–5)
ALBUMIN/GLOB SERPL: 1.3 G/DL (ref 1–2)
ALP SERPL-CCNC: 84 U/L (ref 38–126)
ALT SERPL W P-5'-P-CCNC: 49 U/L (ref 13–69)
ANION GAP SERPL CALCULATED.3IONS-SCNC: 15.8 MMOL/L (ref 10–20)
AST SERPL-CCNC: 23 U/L (ref 15–46)
BASOPHILS # BLD AUTO: 0.09 10*3/MM3 (ref 0–0.2)
BASOPHILS NFR BLD AUTO: 0.7 % (ref 0–2.5)
BILIRUB SERPL-MCNC: 0.3 MG/DL (ref 0.2–1.3)
BUN BLD-MCNC: 20 MG/DL (ref 7–20)
BUN/CREAT SERPL: 28.6 (ref 6.3–21.9)
CALCIUM SPEC-SCNC: 9.3 MG/DL (ref 8.4–10.2)
CHLORIDE SERPL-SCNC: 101 MMOL/L (ref 98–107)
CO2 SERPL-SCNC: 25 MMOL/L (ref 26–30)
CREAT BLD-MCNC: 0.7 MG/DL (ref 0.6–1.3)
DEPRECATED RDW RBC AUTO: 40.8 FL (ref 37–54)
EOSINOPHIL # BLD AUTO: 0.5 10*3/MM3 (ref 0–0.7)
EOSINOPHIL NFR BLD AUTO: 4.2 % (ref 0–7)
ERYTHROCYTE [DISTWIDTH] IN BLOOD BY AUTOMATED COUNT: 12.4 % (ref 11.5–14.5)
GFR SERPL CREATININE-BSD FRML MDRD: 125 ML/MIN/1.73
GLOBULIN UR ELPH-MCNC: 3.1 GM/DL
GLUCOSE BLD-MCNC: 237 MG/DL (ref 74–98)
HBA1C MFR BLD: 8.8 % (ref 3–6)
HCT VFR BLD AUTO: 49.8 % (ref 42–52)
HGB BLD-MCNC: 16.9 G/DL (ref 14–18)
IMM GRANULOCYTES # BLD: 0.08 10*3/MM3 (ref 0–0.06)
IMM GRANULOCYTES NFR BLD: 0.7 % (ref 0–0.6)
LYMPHOCYTES # BLD AUTO: 4 10*3/MM3 (ref 0.6–3.4)
LYMPHOCYTES NFR BLD AUTO: 33.3 % (ref 10–50)
MCH RBC QN AUTO: 30.5 PG (ref 27–31)
MCHC RBC AUTO-ENTMCNC: 33.9 G/DL (ref 30–37)
MCV RBC AUTO: 89.7 FL (ref 80–94)
MONOCYTES # BLD AUTO: 0.57 10*3/MM3 (ref 0–0.9)
MONOCYTES NFR BLD AUTO: 4.7 % (ref 0–12)
NEUTROPHILS # BLD AUTO: 6.77 10*3/MM3 (ref 2–6.9)
NEUTROPHILS NFR BLD AUTO: 56.4 % (ref 37–80)
NRBC BLD MANUAL-RTO: 0 /100 WBC (ref 0–0)
PLATELET # BLD AUTO: 309 10*3/MM3 (ref 130–400)
PMV BLD AUTO: 10.6 FL (ref 6–12)
POTASSIUM BLD-SCNC: 4.8 MMOL/L (ref 3.5–5.1)
PROT SERPL-MCNC: 7.1 G/DL (ref 6.3–8.2)
RBC # BLD AUTO: 5.55 10*6/MM3 (ref 4.7–6.1)
SODIUM BLD-SCNC: 137 MMOL/L (ref 137–145)
WBC NRBC COR # BLD: 12.01 10*3/MM3 (ref 4.8–10.8)

## 2018-06-14 PROCEDURE — 85025 COMPLETE CBC W/AUTO DIFF WBC: CPT | Performed by: INTERNAL MEDICINE

## 2018-06-14 PROCEDURE — 80053 COMPREHEN METABOLIC PANEL: CPT | Performed by: INTERNAL MEDICINE

## 2018-06-14 PROCEDURE — 83036 HEMOGLOBIN GLYCOSYLATED A1C: CPT | Performed by: INTERNAL MEDICINE

## 2018-06-21 ENCOUNTER — OFFICE VISIT (OUTPATIENT)
Dept: ORTHOPEDIC SURGERY | Facility: CLINIC | Age: 43
End: 2018-06-21

## 2018-06-21 ENCOUNTER — LAB REQUISITION (OUTPATIENT)
Dept: LAB | Facility: HOSPITAL | Age: 43
End: 2018-06-21

## 2018-06-21 VITALS — BODY MASS INDEX: 34.19 KG/M2 | RESPIRATION RATE: 12 BRPM | WEIGHT: 258 LBS | HEIGHT: 73 IN

## 2018-06-21 DIAGNOSIS — L03.032 ABSCESS OR CELLULITIS OF TOE, LEFT: ICD-10-CM

## 2018-06-21 DIAGNOSIS — L84 CALLUS OF FOOT: ICD-10-CM

## 2018-06-21 DIAGNOSIS — Z79.4 TYPE 2 DIABETES MELLITUS WITH OTHER SKIN ULCER, WITH LONG-TERM CURRENT USE OF INSULIN (HCC): Primary | ICD-10-CM

## 2018-06-21 DIAGNOSIS — B95.8 UNSPECIFIED STAPHYLOCOCCUS AS THE CAUSE OF DISEASES CLASSIFIED ELSEWHERE: ICD-10-CM

## 2018-06-21 DIAGNOSIS — E11.42 DIABETIC POLYNEUROPATHY ASSOCIATED WITH TYPE 2 DIABETES MELLITUS (HCC): ICD-10-CM

## 2018-06-21 DIAGNOSIS — L02.612 ABSCESS OR CELLULITIS OF TOE, LEFT: ICD-10-CM

## 2018-06-21 DIAGNOSIS — M20.5X2 HALLUX LIMITUS OF LEFT FOOT: ICD-10-CM

## 2018-06-21 DIAGNOSIS — M86.171 OTHER ACUTE OSTEOMYELITIS, RIGHT ANKLE AND FOOT (HCC): ICD-10-CM

## 2018-06-21 DIAGNOSIS — E11.622 TYPE 2 DIABETES MELLITUS WITH OTHER SKIN ULCER, WITH LONG-TERM CURRENT USE OF INSULIN (HCC): Primary | ICD-10-CM

## 2018-06-21 DIAGNOSIS — M86.9 OSTEOMYELITIS OF ANKLE OR FOOT: ICD-10-CM

## 2018-06-21 LAB
ALBUMIN SERPL-MCNC: 4.5 G/DL (ref 3.5–5)
ALBUMIN/GLOB SERPL: 1.4 G/DL (ref 1–2)
ALP SERPL-CCNC: 86 U/L (ref 38–126)
ALT SERPL W P-5'-P-CCNC: 34 U/L (ref 13–69)
ANION GAP SERPL CALCULATED.3IONS-SCNC: 14.2 MMOL/L (ref 10–20)
AST SERPL-CCNC: 25 U/L (ref 15–46)
BASOPHILS # BLD AUTO: 0.07 10*3/MM3 (ref 0–0.2)
BASOPHILS NFR BLD AUTO: 0.6 % (ref 0–2.5)
BILIRUB SERPL-MCNC: 0.5 MG/DL (ref 0.2–1.3)
BUN BLD-MCNC: 20 MG/DL (ref 7–20)
BUN/CREAT SERPL: 33.3 (ref 6.3–21.9)
CALCIUM SPEC-SCNC: 9.6 MG/DL (ref 8.4–10.2)
CHLORIDE SERPL-SCNC: 105 MMOL/L (ref 98–107)
CO2 SERPL-SCNC: 26 MMOL/L (ref 26–30)
CREAT BLD-MCNC: 0.6 MG/DL (ref 0.6–1.3)
DEPRECATED RDW RBC AUTO: 40.9 FL (ref 37–54)
EOSINOPHIL # BLD AUTO: 0.42 10*3/MM3 (ref 0–0.7)
EOSINOPHIL NFR BLD AUTO: 3.5 % (ref 0–7)
ERYTHROCYTE [DISTWIDTH] IN BLOOD BY AUTOMATED COUNT: 12.4 % (ref 11.5–14.5)
GFR SERPL CREATININE-BSD FRML MDRD: 149 ML/MIN/1.73
GLOBULIN UR ELPH-MCNC: 3.3 GM/DL
GLUCOSE BLD-MCNC: 124 MG/DL (ref 74–98)
HCT VFR BLD AUTO: 51 % (ref 42–52)
HGB BLD-MCNC: 17.3 G/DL (ref 14–18)
IMM GRANULOCYTES # BLD: 0.03 10*3/MM3 (ref 0–0.06)
IMM GRANULOCYTES NFR BLD: 0.3 % (ref 0–0.6)
LYMPHOCYTES # BLD AUTO: 4.27 10*3/MM3 (ref 0.6–3.4)
LYMPHOCYTES NFR BLD AUTO: 35.6 % (ref 10–50)
MCH RBC QN AUTO: 30.6 PG (ref 27–31)
MCHC RBC AUTO-ENTMCNC: 33.9 G/DL (ref 30–37)
MCV RBC AUTO: 90.3 FL (ref 80–94)
MONOCYTES # BLD AUTO: 0.78 10*3/MM3 (ref 0–0.9)
MONOCYTES NFR BLD AUTO: 6.5 % (ref 0–12)
NEUTROPHILS # BLD AUTO: 6.42 10*3/MM3 (ref 2–6.9)
NEUTROPHILS NFR BLD AUTO: 53.5 % (ref 37–80)
NRBC BLD MANUAL-RTO: 0 /100 WBC (ref 0–0)
PLATELET # BLD AUTO: 266 10*3/MM3 (ref 130–400)
PMV BLD AUTO: 10.3 FL (ref 6–12)
POTASSIUM BLD-SCNC: 4.2 MMOL/L (ref 3.5–5.1)
PROT SERPL-MCNC: 7.8 G/DL (ref 6.3–8.2)
RBC # BLD AUTO: 5.65 10*6/MM3 (ref 4.7–6.1)
SODIUM BLD-SCNC: 141 MMOL/L (ref 137–145)
WBC NRBC COR # BLD: 11.99 10*3/MM3 (ref 4.8–10.8)

## 2018-06-21 PROCEDURE — 11042 DBRDMT SUBQ TIS 1ST 20SQCM/<: CPT | Performed by: PODIATRIST

## 2018-06-21 PROCEDURE — 99024 POSTOP FOLLOW-UP VISIT: CPT | Performed by: PODIATRIST

## 2018-06-21 PROCEDURE — 80053 COMPREHEN METABOLIC PANEL: CPT | Performed by: INTERNAL MEDICINE

## 2018-06-21 PROCEDURE — 99212 OFFICE O/P EST SF 10 MIN: CPT | Performed by: PODIATRIST

## 2018-06-21 PROCEDURE — 85025 COMPLETE CBC W/AUTO DIFF WBC: CPT | Performed by: INTERNAL MEDICINE

## 2018-06-21 RX ORDER — OXYCODONE AND ACETAMINOPHEN 10; 325 MG/1; MG/1
1 TABLET ORAL 2 TIMES DAILY
COMMUNITY
End: 2019-04-25

## 2018-06-21 NOTE — PROGRESS NOTES
Subjective   Patient ID: Wai Medeiros is a 42 y.o. male STATUS POST:  Post-op of the Right Foot (Right foot Verma arthroplasty, wound debridement with graft application, phl repair)  He presents back today for his right foot after being discharged from the hospital.  Says he's been here in the hospital quite a lot since his discharge due to his mother being sick.  His dressing was intact.  He's utilizing his boot.  States labs are being drawn by home health on weekly basis.  Says he's concerned about some pain and swelling he has in his left second toe as well.  The nails come off twice and denies any wound or opening or drainage but complains of pain.    History of Present Illness    Review of Systems   Constitutional: Negative for diaphoresis, fever and unexpected weight change.   HENT: Negative for dental problem and sore throat.    Eyes: Negative for visual disturbance.   Respiratory: Negative for shortness of breath.    Cardiovascular: Negative for chest pain.   Gastrointestinal: Negative for abdominal pain, constipation, diarrhea, nausea and vomiting.   Genitourinary: Negative for difficulty urinating and frequency.   Musculoskeletal: Positive for arthralgias ( pain in top of right foot when walking).   Neurological: Negative for headaches.   Hematological: Does not bruise/bleed easily.       Past Medical History:   Diagnosis Date   • Anxiety and depression    • Constipation    • Depression    • Diabetes mellitus    • Fatty liver    • GERD (gastroesophageal reflux disease)    • High cholesterol    • History of methicillin resistant staphylococcus aureus (MRSA) 2013    RIGHT THIGH   • Hypertension    • Injury of back    • Neuropathy    • Pancreatitis    • Wears glasses         Past Surgical History:   Procedure Laterality Date   • APPENDECTOMY     • BACK SURGERY      L4-5 LAMINECTOMY   • CHOLECYSTECTOMY     • FOOT ARTHROPLASTY Left 4/2/2018    Procedure: Left foot Verma arthroplasty, wound  debridement and grafting;  Surgeon: Sammy Antoine DPM;  Location: [facility] OR;  Service: Orthopedics   • FOOT IRRIGATION, DEBRIDEMENT AND REPAIR Left 1/6/2018    Procedure: Left foot irrigation and debridement, wound closure, graft application;  Surgeon: Sammy Antoine DPM;  Location: [facility] OR;  Service:    • INCISION AND DRAINAGE FOOT Left 1/3/2018    Procedure: INCISION AND DRAINAGE FOOT ABSCESS, GREAT TOE;  Surgeon: Sammy Antoine DPM;  Location: [facility] OR;  Service:    • SCROTAL SURGERY     • TOE ARTHROPLASTY Right 6/5/2018    Procedure: Right foot Verma arthroplasty, wound debridement with graft application, phl repair;  Surgeon: Sammy Antoine DPM;  Location: [facility] OR;  Service: Orthopedics   • WISDOM TOOTH EXTRACTION         Social History     Social History   • Marital status:      Spouse name: N/A   • Number of children: N/A   • Years of education: N/A     Occupational History   • Not on file.     Social History Main Topics   • Smoking status: Current Every Day Smoker     Packs/day: 1.00     Types: Cigarettes   • Smokeless tobacco: Current User     Types: Snuff   • Alcohol use No   • Drug use: No   • Sexual activity: Defer     Other Topics Concern   • Not on file     Social History Narrative   • No narrative on file       Ready to quit: No  Counseling given: Not Answered      I have reviewed all of the above social hx, family hx, surgical hx, medications, allergies & ROS and confirm that it is accurate.    No Known Allergies    Objective   Physical Exam   Constitutional: He is oriented to person, place, and time. He appears well-developed and well-nourished.   HENT:   Head: Normocephalic and atraumatic.   Nose: Nose normal.   Eyes: Conjunctivae and EOM are normal. Pupils are equal, round, and reactive to light.   Neck: Normal range of motion.   Pulmonary/Chest: Effort normal.   Neurological: He is alert and oriented to person, place, and time.   Skin: Skin is warm.   Psychiatric: He has a  normal mood and affect. His behavior is normal. Judgment and thought content normal.   Nursing note and vitals reviewed.    Ortho Exam  Ortho Exam right foot exam  Incisions are clean dry and intact, well coapted, no sign of dehiscence  Sutures are intact.  No sign of wound dehiscence.  There is still some blanchable erythema and slight edema around the first MTPJ but no open wounds or drainage.  There is no ascending cellulitis or streaking.  He remains grossly neurovascularly intact with decreased protective sensation.  There is a very large thick exuberant callus around the entire plantar and medial hallux.  Once this is debrided shows there is still a very thin pinpoint open ulceration that is barely full-thickness into the subcutaneous tissue layer that measures roughly 0.5 cm.    Left foot exam shows no significant orthopedic changes.  He's grossly neurovascularly intact.  Protective sensation is diminished.  He does have some edema to the distal aspect of the second digit, left foot.  It's somewhat tender to palpation.  There is some callus dry skin at the nail edge distally but no open wound.      Assessment/Plan  status post 2 weeks from right foot Verma arthroplasty with wound debridement and grafting, extensor hallucis longus tendon repair.  Diabetes.  Neuropathy.  Questionable left foot second digit cellulitis versus hematogenous spread osteomyelitis      Independent Review of Radiographic Studies:      Laboratory and Other Studies:     Medical Decision Making:                  Procedures 15 blade was used to perform full-thickness excisional wound debridement of the plantar medial right hallux wound into the subcutaneous tissue removing exuberant callus and dead devitalized tissue and hematoma.  Granular bleeding healthy wound remained that measures 0.5 cm.   [] No procedures were performed in office today.    There are no diagnoses linked to this encounter.      Recommendations/Plan:  1.  Dressing was  applied around the right great toe.  He should continue his IV antibiotics.  I recommend he keep the toe clean and covered and dry.  He is not to get this foot wet.  I do not recommend any advanced wound care products to this at this time.  Follow up for this in 2 weeks.  Continue weightbearing with his boot.  If these healed well in 2 weeks we will refer to Dr. Loo for diabetic shoes.      2.  For the left foot he is to monitor this closely.  If anything worsens or changes he is to let me know and we may consider some other sort of antibiotic however he is receiving IV antibiotics.  The wounds open or beginning should let me know immediately.  I will send him for x-rays and evaluate those upon next visit as well.    No Follow-up on file.  Patient agreeable to call or return sooner for any concerns.

## 2018-06-28 ENCOUNTER — LAB REQUISITION (OUTPATIENT)
Dept: LAB | Facility: HOSPITAL | Age: 43
End: 2018-06-28

## 2018-06-28 DIAGNOSIS — B95.8 UNSPECIFIED STAPHYLOCOCCUS AS THE CAUSE OF DISEASES CLASSIFIED ELSEWHERE: ICD-10-CM

## 2018-06-28 DIAGNOSIS — Z09 FOLLOW UP: Primary | ICD-10-CM

## 2018-06-28 DIAGNOSIS — M86.171 OTHER ACUTE OSTEOMYELITIS, RIGHT ANKLE AND FOOT (HCC): ICD-10-CM

## 2018-06-28 LAB
ALBUMIN SERPL-MCNC: 4.3 G/DL (ref 3.5–5)
ALBUMIN/GLOB SERPL: 1.4 G/DL (ref 1–2)
ALP SERPL-CCNC: 78 U/L (ref 38–126)
ALT SERPL W P-5'-P-CCNC: 39 U/L (ref 13–69)
ANION GAP SERPL CALCULATED.3IONS-SCNC: 15.6 MMOL/L (ref 10–20)
AST SERPL-CCNC: 22 U/L (ref 15–46)
BASOPHILS # BLD AUTO: 0.07 10*3/MM3 (ref 0–0.2)
BASOPHILS NFR BLD AUTO: 0.7 % (ref 0–2.5)
BILIRUB SERPL-MCNC: 0.3 MG/DL (ref 0.2–1.3)
BUN BLD-MCNC: 17 MG/DL (ref 7–20)
BUN/CREAT SERPL: 24.3 (ref 6.3–21.9)
CALCIUM SPEC-SCNC: 9.5 MG/DL (ref 8.4–10.2)
CHLORIDE SERPL-SCNC: 102 MMOL/L (ref 98–107)
CO2 SERPL-SCNC: 25 MMOL/L (ref 26–30)
CREAT BLD-MCNC: 0.7 MG/DL (ref 0.6–1.3)
DEPRECATED RDW RBC AUTO: 41.3 FL (ref 37–54)
EOSINOPHIL # BLD AUTO: 0.54 10*3/MM3 (ref 0–0.7)
EOSINOPHIL NFR BLD AUTO: 5.2 % (ref 0–7)
ERYTHROCYTE [DISTWIDTH] IN BLOOD BY AUTOMATED COUNT: 12.6 % (ref 11.5–14.5)
GFR SERPL CREATININE-BSD FRML MDRD: 125 ML/MIN/1.73
GLOBULIN UR ELPH-MCNC: 3 GM/DL
GLUCOSE BLD-MCNC: 211 MG/DL (ref 74–98)
HCT VFR BLD AUTO: 52.6 % (ref 42–52)
HGB BLD-MCNC: 18 G/DL (ref 14–18)
IMM GRANULOCYTES # BLD: 0.03 10*3/MM3 (ref 0–0.06)
IMM GRANULOCYTES NFR BLD: 0.3 % (ref 0–0.6)
LYMPHOCYTES # BLD AUTO: 3.89 10*3/MM3 (ref 0.6–3.4)
LYMPHOCYTES NFR BLD AUTO: 37.5 % (ref 10–50)
MCH RBC QN AUTO: 30.8 PG (ref 27–31)
MCHC RBC AUTO-ENTMCNC: 34.2 G/DL (ref 30–37)
MCV RBC AUTO: 90.1 FL (ref 80–94)
MONOCYTES # BLD AUTO: 0.56 10*3/MM3 (ref 0–0.9)
MONOCYTES NFR BLD AUTO: 5.4 % (ref 0–12)
NEUTROPHILS # BLD AUTO: 5.27 10*3/MM3 (ref 2–6.9)
NEUTROPHILS NFR BLD AUTO: 50.9 % (ref 37–80)
NRBC BLD MANUAL-RTO: 0 /100 WBC (ref 0–0)
PLATELET # BLD AUTO: 237 10*3/MM3 (ref 130–400)
PMV BLD AUTO: 10.5 FL (ref 6–12)
POTASSIUM BLD-SCNC: 4.6 MMOL/L (ref 3.5–5.1)
PROT SERPL-MCNC: 7.3 G/DL (ref 6.3–8.2)
RBC # BLD AUTO: 5.84 10*6/MM3 (ref 4.7–6.1)
SODIUM BLD-SCNC: 138 MMOL/L (ref 137–145)
WBC NRBC COR # BLD: 10.36 10*3/MM3 (ref 4.8–10.8)

## 2018-06-28 PROCEDURE — 85025 COMPLETE CBC W/AUTO DIFF WBC: CPT | Performed by: INTERNAL MEDICINE

## 2018-06-28 PROCEDURE — 80053 COMPREHEN METABOLIC PANEL: CPT | Performed by: INTERNAL MEDICINE

## 2018-07-05 ENCOUNTER — OFFICE VISIT (OUTPATIENT)
Dept: ORTHOPEDIC SURGERY | Facility: CLINIC | Age: 43
End: 2018-07-05

## 2018-07-05 VITALS — RESPIRATION RATE: 12 BRPM | WEIGHT: 258 LBS | BODY MASS INDEX: 34.19 KG/M2 | HEIGHT: 73 IN

## 2018-07-05 DIAGNOSIS — E11.42 DIABETIC POLYNEUROPATHY ASSOCIATED WITH TYPE 2 DIABETES MELLITUS (HCC): ICD-10-CM

## 2018-07-05 DIAGNOSIS — E11.622 TYPE 2 DIABETES MELLITUS WITH OTHER SKIN ULCER, WITH LONG-TERM CURRENT USE OF INSULIN (HCC): ICD-10-CM

## 2018-07-05 DIAGNOSIS — M86.9 OSTEOMYELITIS OF ANKLE OR FOOT: ICD-10-CM

## 2018-07-05 DIAGNOSIS — M25.572 TOE JOINT PAIN, LEFT: ICD-10-CM

## 2018-07-05 DIAGNOSIS — L97.521 ULCER OF FOOT, LEFT, LIMITED TO BREAKDOWN OF SKIN (HCC): Primary | ICD-10-CM

## 2018-07-05 DIAGNOSIS — Z79.4 TYPE 2 DIABETES MELLITUS WITH OTHER SKIN ULCER, WITH LONG-TERM CURRENT USE OF INSULIN (HCC): ICD-10-CM

## 2018-07-05 PROCEDURE — 99024 POSTOP FOLLOW-UP VISIT: CPT | Performed by: PODIATRIST

## 2018-07-05 PROCEDURE — 99213 OFFICE O/P EST LOW 20 MIN: CPT | Performed by: PODIATRIST

## 2018-07-05 NOTE — PROGRESS NOTES
Subjective   Patient ID: Wai Medeiros is a 42 y.o. male STATUS POST:  Post-op of the Right Foot (Right foot Verma arthroplasty, wound debridement with graft application, phl repair )  He presents back today for his right and left foot.  He status post recent Verma arthroplasty of the right foot with wound debridement and grafting along with the extensor hallucis longus tendon repair due to osteomyelitis and wound of the right great toe.  Also still complaining of pain and swelling of the left second toe.  Utilizing his boot.  States is The toe wrapped and covered on the right side.  Says today is his last dose of his IV antibiotics via PICC line and his primary care physician has written him prescription for oral antibiotics to begin.  Complains of some redness and swelling and occasional pain in the right foot as well.  States his home health nurse wanted him to be evaluated previously but he waited and it seemed to resolve.    History of Present Illness    Review of Systems   Constitutional: Negative for diaphoresis, fever and unexpected weight change.   HENT: Negative for dental problem and sore throat.    Eyes: Negative for visual disturbance.   Respiratory: Negative for shortness of breath.    Cardiovascular: Negative for chest pain.   Gastrointestinal: Negative for abdominal pain, constipation, diarrhea, nausea and vomiting.   Genitourinary: Negative for difficulty urinating and frequency.   Musculoskeletal: Positive for arthralgias and joint swelling.   Neurological: Negative for headaches.   Hematological: Does not bruise/bleed easily.       Past Medical History:   Diagnosis Date   • Anxiety and depression    • Constipation    • Depression    • Diabetes mellitus (CMS/HCC)    • Fatty liver    • GERD (gastroesophageal reflux disease)    • High cholesterol    • History of methicillin resistant staphylococcus aureus (MRSA) 2013    RIGHT THIGH   • Hypertension    • Injury of back    • Neuropathy     • Pancreatitis    • Wears glasses         Past Surgical History:   Procedure Laterality Date   • APPENDECTOMY     • BACK SURGERY      L4-5 LAMINECTOMY   • CHOLECYSTECTOMY     • FOOT ARTHROPLASTY Left 4/2/2018    Procedure: Left foot Verma arthroplasty, wound debridement and grafting;  Surgeon: Sammy Antoine DPM;  Location: [facility];  Service: Orthopedics   • FOOT IRRIGATION, DEBRIDEMENT AND REPAIR Left 1/6/2018    Procedure: Left foot irrigation and debridement, wound closure, graft application;  Surgeon: Sammy Antoine DPM;  Location: [facility];  Service:    • INCISION AND DRAINAGE FOOT Left 1/3/2018    Procedure: INCISION AND DRAINAGE FOOT ABSCESS, GREAT TOE;  Surgeon: Sammy Antoine DPM;  Location: [facility];  Service:    • SCROTAL SURGERY     • TOE ARTHROPLASTY Right 6/5/2018    Procedure: Right foot Verma arthroplasty, wound debridement with graft application, phl repair;  Surgeon: Sammy Antoine DPM;  Location: [facility];  Service: Orthopedics   • WISDOM TOOTH EXTRACTION         Social History     Social History   • Marital status:      Spouse name: N/A   • Number of children: N/A   • Years of education: N/A     Occupational History   • Not on file.     Social History Main Topics   • Smoking status: Current Every Day Smoker     Packs/day: 1.00     Types: Cigarettes   • Smokeless tobacco: Current User     Types: Snuff   • Alcohol use No   • Drug use: No   • Sexual activity: Defer     Other Topics Concern   • Not on file     Social History Narrative   • No narrative on file       Ready to quit: No  Counseling given: Not Answered      I have reviewed all of the above social hx, family hx, surgical hx, medications, allergies & ROS and confirm that it is accurate.    No Known Allergies    Objective   Physical Exam   Constitutional: He is oriented to person, place, and time. He appears well-developed and well-nourished.   HENT:   Head: Normocephalic and atraumatic.   Nose: Nose normal.   Eyes:  Conjunctivae and EOM are normal. Pupils are equal, round, and reactive to light.   Neck: Normal range of motion.   Pulmonary/Chest: Effort normal.   Neurological: He is alert and oriented to person, place, and time.   Skin: Skin is warm.   Psychiatric: He has a normal mood and affect. His behavior is normal. Judgment and thought content normal.   Nursing note and vitals reviewed.    Ortho Exam  Ortho Exam  Ortho Exam right foot exam  Incisions are clean dry and intact, well coapted, no sign of dehiscence  Sutures are intact.  No sign of wound dehiscence.  There is still some blanchable erythema and slight edema around the first MTPJ but no open wounds or drainage.  There is no ascending cellulitis or streaking.  He remains grossly neurovascularly intact with decreased protective sensation.  There is a thin callus around the entire plantar and medial hallux.  All wounds have healed.     Left foot exam shows no significant orthopedic changes.  He's grossly neurovascularly intact.  Protective sensation is diminished.  He does have some edema to the distal aspect of the second digit, left foot.  It's somewhat tender to palpation.  There is some callus dry skin at the nail edge distally but no open wound.         Assessment/Plan  status post 1 month from right foot Verma arthroplasty with wound debridement and grafting and extensor hallucis longus tendon repair, left second toe pain and swelling, diabetes, neuropathy, right foot osteomyelitis.  Independent Review of Radiographic Studies:      Laboratory and Other Studies:     Medical Decision Making:        Procedures  [] No procedures were performed in office today.    Wai was seen today for post-op.    Diagnoses and all orders for this visit:    Ulcer of foot, left, limited to breakdown of skin (CMS/[facility])  -     XR Foot 3+ View Left    Osteomyelitis of ankle or foot (CMS/[facility])    Diabetic polyneuropathy associated with type 2 diabetes mellitus (CMS/HCC)    Type 2  diabetes mellitus with other skin ulcer, with long-term current use of insulin (CMS/[facility])    Toe joint pain, left          Recommendations/Plan:  I debrided the dry scab tissue off the right great toe.  I applied a moistening a Band-Aid.  He will continue Aquaphor to this area.  He may begin regular shoe gear.  He'll be referred to Dr. Loo for diabetic shoes and inserts.  Resume oral antibiotics after PICC line removed Spurs primary care physician.  I will send him downstairs for x-rays of the left second toe for evaluation.  Follow-up in 3 weeks unless he notices any changes or concerns or I find abnormalities to the left foot x-rays.    Return in about 3 weeks (around 7/26/2018).  Patient agreeable to call or return sooner for any concerns.

## 2018-07-16 DIAGNOSIS — L97.521 ULCER OF FOOT, LEFT, LIMITED TO BREAKDOWN OF SKIN (HCC): Primary | ICD-10-CM

## 2018-07-19 ENCOUNTER — OFFICE VISIT (OUTPATIENT)
Dept: ORTHOPEDIC SURGERY | Facility: CLINIC | Age: 43
End: 2018-07-19

## 2018-07-19 VITALS — RESPIRATION RATE: 12 BRPM | WEIGHT: 258 LBS | HEIGHT: 73 IN | BODY MASS INDEX: 34.19 KG/M2

## 2018-07-19 DIAGNOSIS — M20.5X2 HALLUX LIMITUS OF LEFT FOOT: Primary | ICD-10-CM

## 2018-07-19 DIAGNOSIS — E11.42 DIABETIC POLYNEUROPATHY ASSOCIATED WITH TYPE 2 DIABETES MELLITUS (HCC): ICD-10-CM

## 2018-07-19 DIAGNOSIS — L84 CALLUS OF FOOT: ICD-10-CM

## 2018-07-19 DIAGNOSIS — L97.511 ULCER OF FOOT, CHRONIC, RIGHT, LIMITED TO BREAKDOWN OF SKIN (HCC): ICD-10-CM

## 2018-07-19 DIAGNOSIS — Z79.4 TYPE 2 DIABETES MELLITUS WITH OTHER SKIN ULCER, WITH LONG-TERM CURRENT USE OF INSULIN (HCC): ICD-10-CM

## 2018-07-19 DIAGNOSIS — M86.9 OSTEOMYELITIS OF ANKLE OR FOOT: ICD-10-CM

## 2018-07-19 DIAGNOSIS — E11.622 TYPE 2 DIABETES MELLITUS WITH OTHER SKIN ULCER, WITH LONG-TERM CURRENT USE OF INSULIN (HCC): ICD-10-CM

## 2018-07-19 DIAGNOSIS — L97.521 ULCER OF FOOT, LEFT, LIMITED TO BREAKDOWN OF SKIN (HCC): ICD-10-CM

## 2018-07-19 PROCEDURE — 99024 POSTOP FOLLOW-UP VISIT: CPT | Performed by: PODIATRIST

## 2018-07-19 PROCEDURE — 99213 OFFICE O/P EST LOW 20 MIN: CPT | Performed by: PODIATRIST

## 2018-07-19 NOTE — PROGRESS NOTES
Subjective   Patient ID: Wai Medeiros is a 42 y.o. male   Presents back today for follow-up evaluation on both feet.  Wearing normal shoe gear.  Denies complaints.  States his primary care physician placed him on Bactrim.  States he thinks he figured out why his left second toe is swollen and red.  Says he's notice when he walks his toes curl significantly.    History of Present Illness    Pain Score: 5  Pain Location: Foot  Pain Orientation: Right     Pain Descriptors: Aching  Pain Frequency: Constant/continuous  Pain Onset: Ongoing     Clinical Progression: Gradually worsening  Aggravating Factors: Walking           Result of Injury: No       Review of Systems   Constitutional: Negative for diaphoresis, fever and unexpected weight change.   HENT: Negative for dental problem and sore throat.    Eyes: Negative for visual disturbance.   Respiratory: Negative for shortness of breath.    Cardiovascular: Negative for chest pain.   Gastrointestinal: Negative for abdominal pain, constipation, diarrhea, nausea and vomiting.   Genitourinary: Negative for difficulty urinating and frequency.   Musculoskeletal: Positive for arthralgias and joint swelling.   Skin: Positive for wound.   Neurological: Negative for headaches.   Hematological: Does not bruise/bleed easily.       Past Medical History:   Diagnosis Date   • Anxiety and depression    • Constipation    • Depression    • Diabetes mellitus (CMS/HCC)    • Fatty liver    • GERD (gastroesophageal reflux disease)    • High cholesterol    • History of methicillin resistant staphylococcus aureus (MRSA) 2013    RIGHT THIGH   • Hypertension    • Injury of back    • Neuropathy    • Pancreatitis    • Wears glasses         Past Surgical History:   Procedure Laterality Date   • APPENDECTOMY     • BACK SURGERY      L4-5 LAMINECTOMY   • CHOLECYSTECTOMY     • FOOT ARTHROPLASTY Left 4/2/2018    Procedure: Left foot Verma arthroplasty, wound debridement and grafting;   Surgeon: Sammy Antoine DPM;  Location: [facility];  Service: Orthopedics   • FOOT IRRIGATION, DEBRIDEMENT AND REPAIR Left 1/6/2018    Procedure: Left foot irrigation and debridement, wound closure, graft application;  Surgeon: Sammy Antoine DPM;  Location: [facility] OR;  Service:    • INCISION AND DRAINAGE FOOT Left 1/3/2018    Procedure: INCISION AND DRAINAGE FOOT ABSCESS, GREAT TOE;  Surgeon: Sammy Antoine DPM;  Location: [facility] OR;  Service:    • SCROTAL SURGERY     • TOE ARTHROPLASTY Right 6/5/2018    Procedure: Right foot Verma arthroplasty, wound debridement with graft application, phl repair;  Surgeon: Sammy Antoine DPM;  Location: [facility];  Service: Orthopedics   • WISDOM TOOTH EXTRACTION         Social History     Social History   • Marital status:      Spouse name: N/A   • Number of children: N/A   • Years of education: N/A     Occupational History   • Not on file.     Social History Main Topics   • Smoking status: Current Every Day Smoker     Packs/day: 1.00     Types: Cigarettes   • Smokeless tobacco: Current User     Types: Snuff   • Alcohol use No   • Drug use: No   • Sexual activity: Defer     Other Topics Concern   • Not on file     Social History Narrative   • No narrative on file       Ready to quit: Not Answered  Counseling given: Not Answered      I have reviewed all of the above social hx, family hx, surgical hx, medications, allergies & ROS and confirm that it is accurate.    No Known Allergies    Objective   Physical Exam  Ortho Exam  Ortho Exam  Bilateral lower extremity exam reveals he is grossly neurovascular intact in both feet, pulses palpable, no significant edema noted.  He does show some dependent rubor and hyperpigmentation but this is alleviated with elevation, protective sensation loss but gross sensation intact.  Good range of motion of both first metatarsal phalangeal joints.  No open wounds or sores or lesions but he does have a callused slightly hyperpigmented  hyperkeratotic lesion on the distal tip of the left third digit.      Assessment/Plan  status post bilateral Verma arthroplasty with wound debridement and grafting secondary to hallux limitus, resolved bilateral foot wounds, left third digit callus/blister, potential pre-ulcerative lesion on the left second digit, diabetes, neuropathy  Independent Review of Radiographic Studies:      Laboratory and Other Studies:     Medical Decision Making:        Procedures  Debridement Note      There are no diagnoses linked to this encounter.      Recommendations/Plan:  He will continue suppressive antibiotic therapy per his primary care physician.  He scheduled for diabetic shoes with Dr. Loo next month.  Follow up here in 4-6 weeks.  I discussed padding the tips of his toes and monitoring the left foot second and third toes closely to help prevent any ulceration or callus formation.  If anything changes or worsens he can come back sooner.    No Follow-up on file.  Patient agreeable to call or return sooner for any concerns.

## 2018-08-14 ENCOUNTER — HOSPITAL ENCOUNTER (EMERGENCY)
Facility: HOSPITAL | Age: 43
Discharge: HOME | End: 2018-08-14
Attending: EMERGENCY MEDICINE | Admitting: EMERGENCY MEDICINE

## 2018-08-14 ENCOUNTER — APPOINTMENT (OUTPATIENT)
Dept: GENERAL RADIOLOGY | Facility: HOSPITAL | Age: 43
End: 2018-08-14

## 2018-08-14 VITALS
WEIGHT: 260.8 LBS | HEART RATE: 84 BPM | HEIGHT: 72 IN | OXYGEN SATURATION: 98 % | DIASTOLIC BLOOD PRESSURE: 73 MMHG | SYSTOLIC BLOOD PRESSURE: 130 MMHG | TEMPERATURE: 98 F | BODY MASS INDEX: 35.33 KG/M2 | RESPIRATION RATE: 16 BRPM

## 2018-08-14 DIAGNOSIS — M25.572 ARTHRALGIA OF LEFT ANKLE: Primary | ICD-10-CM

## 2018-08-14 PROCEDURE — 73610 X-RAY EXAM OF ANKLE: CPT

## 2018-08-14 PROCEDURE — 99283 EMERGENCY DEPT VISIT LOW MDM: CPT

## 2018-08-14 PROCEDURE — 73630 X-RAY EXAM OF FOOT: CPT

## 2018-08-14 RX ORDER — MELOXICAM 7.5 MG/1
7.5 TABLET ORAL DAILY
Qty: 7 TABLET | Refills: 0 | Status: SHIPPED | OUTPATIENT
Start: 2018-08-14 | End: 2018-08-21

## 2018-08-14 NOTE — ED PROVIDER NOTES
Subjective   42-year-old male presents with left ankle pain.  He states that he awoke with left ankle pain, no injury.  He states the first step out of bed is painful when barium weight on his left ankle.  He states he had some swelling and tenderness.        History provided by:  Patient   used: No        Review of Systems   Musculoskeletal: Positive for arthralgias and myalgias.   All other systems reviewed and are negative.      Past Medical History:   Diagnosis Date   • Anxiety and depression    • Constipation    • Depression    • Diabetes mellitus (CMS/HCC)    • Fatty liver    • GERD (gastroesophageal reflux disease)    • High cholesterol    • History of methicillin resistant staphylococcus aureus (MRSA) 2013    RIGHT THIGH   • Hypertension    • Injury of back    • Neuropathy    • Pancreatitis    • Wears glasses        No Known Allergies    Past Surgical History:   Procedure Laterality Date   • APPENDECTOMY     • BACK SURGERY      L4-5 LAMINECTOMY   • CHOLECYSTECTOMY     • FOOT ARTHROPLASTY Left 4/2/2018    Procedure: Left foot Verma arthroplasty, wound debridement and grafting;  Surgeon: Sammy Antoine DPM;  Location: [facility] OR;  Service: Orthopedics   • FOOT IRRIGATION, DEBRIDEMENT AND REPAIR Left 1/6/2018    Procedure: Left foot irrigation and debridement, wound closure, graft application;  Surgeon: Sammy Antoine DPM;  Location: [facility] OR;  Service:    • INCISION AND DRAINAGE FOOT Left 1/3/2018    Procedure: INCISION AND DRAINAGE FOOT ABSCESS, GREAT TOE;  Surgeon: Sammy Antoine DPM;  Location: [facility] OR;  Service:    • SCROTAL SURGERY     • TOE ARTHROPLASTY Right 6/5/2018    Procedure: Right foot Verma arthroplasty, wound debridement with graft application, phl repair;  Surgeon: Sammy Antoine DPM;  Location: [facility] OR;  Service: Orthopedics   • WISDOM TOOTH EXTRACTION         Family History   Problem Relation Age of Onset   • Diabetes Mother    • COPD Mother    • Diabetes  Father        Social History     Social History   • Marital status:      Social History Main Topics   • Smoking status: Current Every Day Smoker     Packs/day: 1.00     Types: Cigarettes   • Smokeless tobacco: Current User     Types: Snuff   • Alcohol use No   • Drug use: No   • Sexual activity: Defer     Other Topics Concern   • Not on file           Objective   Physical Exam   Constitutional: He is oriented to person, place, and time. He appears well-developed and well-nourished.   HENT:   Head: Normocephalic and atraumatic.   Eyes: Pupils are equal, round, and reactive to light. EOM are normal.   Neck: Normal range of motion.   Cardiovascular: Normal rate and regular rhythm.    Pulmonary/Chest: Effort normal and breath sounds normal.   Abdominal: Soft. Bowel sounds are normal.   Musculoskeletal: Normal range of motion. He exhibits tenderness.   Left ankle tender to palpation, mild swelling   Neurological: He is alert and oriented to person, place, and time.   Skin: Skin is warm and dry.   Psychiatric: He has a normal mood and affect. His behavior is normal. Judgment and thought content normal.   Nursing note and vitals reviewed.      Procedures           ED Course                  MDM      Final diagnoses:   Arthralgia of left ankle            Sundeep Figueroa Jr., HECTOR  08/14/18 [id number]

## 2018-09-10 ENCOUNTER — OFFICE VISIT (OUTPATIENT)
Dept: ORTHOPEDIC SURGERY | Facility: CLINIC | Age: 43
End: 2018-09-10

## 2018-09-10 VITALS — BODY MASS INDEX: 35.21 KG/M2 | HEIGHT: 72 IN | WEIGHT: 260 LBS | RESPIRATION RATE: 16 BRPM

## 2018-09-10 DIAGNOSIS — E11.42 DIABETIC POLYNEUROPATHY ASSOCIATED WITH TYPE 2 DIABETES MELLITUS (HCC): Primary | ICD-10-CM

## 2018-09-10 DIAGNOSIS — Z79.4 TYPE 2 DIABETES MELLITUS WITH OTHER SKIN ULCER, WITH LONG-TERM CURRENT USE OF INSULIN (HCC): ICD-10-CM

## 2018-09-10 DIAGNOSIS — E11.622 TYPE 2 DIABETES MELLITUS WITH OTHER SKIN ULCER, WITH LONG-TERM CURRENT USE OF INSULIN (HCC): ICD-10-CM

## 2018-09-10 DIAGNOSIS — M76.72 PERONEAL TENDINITIS OF LEFT LOWER EXTREMITY: ICD-10-CM

## 2018-09-10 PROCEDURE — 99213 OFFICE O/P EST LOW 20 MIN: CPT | Performed by: PODIATRIST

## 2018-09-10 NOTE — PROGRESS NOTES
Subjective   Patient ID: Wai Medeiros is a 42 y.o. male   Follow-up of the Left Foot (Pain in left ankle, great toe) and Follow-up of the Right Foot  Patient presents back today for follow-up evaluation on his feet on a new complaint of his left great toe and ankle bothering him.  States he still is waiting on paperwork from his primary care physician so has not received diabetic shoes.  States he sees his primary care physician tomorrow.  Complaining about outside of his left ankle hurting.  States he got up out of bed one morning and felt a pain in [place] his leg and it's been somewhat bothersome since.    History of Present Illness                                                   Review of Systems   Constitutional: Negative for diaphoresis, fever and unexpected weight change.   HENT: Negative for dental problem and sore throat.    Eyes: Negative for visual disturbance.   Respiratory: Negative for shortness of breath.    Cardiovascular: Negative for chest pain.   Gastrointestinal: Negative for abdominal pain, constipation, diarrhea, nausea and vomiting.   Genitourinary: Negative for difficulty urinating and frequency.   Musculoskeletal: Positive for arthralgias (left ankle, left great toe).   Neurological: Negative for headaches.   Hematological: Does not bruise/bleed easily.   All other systems reviewed and are negative.      Past Medical History:   Diagnosis Date   • Anxiety and depression    • Constipation    • Depression    • Diabetes mellitus (CMS/HCC)    • Fatty liver    • GERD (gastroesophageal reflux disease)    • High cholesterol    • History of methicillin resistant staphylococcus aureus (MRSA) 2013    RIGHT THIGH   • Hypertension    • Injury of back    • Neuropathy    • Pancreatitis    • Wears glasses         Past Surgical History:   Procedure Laterality Date   • APPENDECTOMY     • BACK SURGERY      L4-5 LAMINECTOMY   • CHOLECYSTECTOMY     • FOOT ARTHROPLASTY Left 4/2/2018    Procedure:  Left foot Verma arthroplasty, wound debridement and grafting;  Surgeon: Sammy Antoine DPM;  Location: [facility] OR;  Service: Orthopedics   • FOOT IRRIGATION, DEBRIDEMENT AND REPAIR Left 1/6/2018    Procedure: Left foot irrigation and debridement, wound closure, graft application;  Surgeon: Sammy Antoine DPM;  Location: [facility] OR;  Service:    • INCISION AND DRAINAGE FOOT Left 1/3/2018    Procedure: INCISION AND DRAINAGE FOOT ABSCESS, GREAT TOE;  Surgeon: Sammy Antoine DPM;  Location: [facility] OR;  Service:    • SCROTAL SURGERY     • TOE ARTHROPLASTY Right 6/5/2018    Procedure: Right foot Verma arthroplasty, wound debridement with graft application, phl repair;  Surgeon: Sammy Antoine DPM;  Location: [facility] OR;  Service: Orthopedics   • WISDOM TOOTH EXTRACTION         No Known Allergies      Current Outpatient Prescriptions:   •  acetaminophen (TYLENOL 8 HOUR) 650 MG 8 hr tablet, Take 1 tablet by mouth Every 8 (Eight) Hours As Needed for Mild Pain ., Disp: 12 tablet, Rfl: 0  •  Canagliflozin (INVOKANA) 300 MG tablet, Take 300 mg by mouth Daily., Disp: , Rfl:   •  DULoxetine (CYMBALTA) 60 MG capsule, Take 60 mg by mouth Daily., Disp: , Rfl:   •  glimepiride (AMARYL) 4 MG tablet, Take 8 mg by mouth Every Morning Before Breakfast., Disp: , Rfl:   •  HYDROcodone-acetaminophen (NORCO) 7.5-325 MG per tablet, Take 1 tablet by mouth Every 6 (Six) Hours As Needed for Moderate Pain ., Disp: 30 tablet, Rfl: 0  •  ibuprofen (ADVIL,MOTRIN) 200 MG tablet, Take 200 mg by mouth Every 6 (Six) Hours As Needed for Mild Pain ., Disp: , Rfl:   •  lisinopril (PRINIVIL,ZESTRIL) 40 MG tablet, Take 40 mg by mouth Daily., Disp: , Rfl:   •  LYRICA 150 MG capsule, Take 1 tablet by mouth three times daily, Disp: , Rfl: 0  •  metFORMIN (GLUCOPHAGE) 1000 MG tablet, Take 1,000 mg by mouth 2 (Two) Times a Day., Disp: , Rfl: 3  •  ondansetron ODT (ZOFRAN-ODT) 4 MG disintegrating tablet, Take 1 tablet by mouth Every 8 (Eight) Hours As  Needed for Nausea or Vomiting., Disp: 8 tablet, Rfl: 0  •  oxyCODONE-acetaminophen (PERCOCET)  MG per tablet, Take 1 tablet by mouth 2 (Two) Times a Day., Disp: , Rfl:   •  OXYCONTIN 15 MG tablet extended-release 12 hour, Take 15 mg by mouth Every 12 (Twelve) Hours., Disp: , Rfl:   •  simvastatin (ZOCOR) 40 MG tablet, Take 40 mg by mouth every night at bedtime., Disp: , Rfl: 3    Family History   Problem Relation Age of Onset   • Diabetes Mother    • COPD Mother    • Diabetes Father        Social History     Social History   • Marital status:      Spouse name: N/A   • Number of children: N/A   • Years of education: N/A     Occupational History   • Not on file.     Social History Main Topics   • Smoking status: Current Every Day Smoker     Packs/day: 1.00     Types: Cigarettes   • Smokeless tobacco: Current User     Types: Snuff   • Alcohol use No   • Drug use: No   • Sexual activity: Defer     Other Topics Concern   • Not on file     Social History Narrative   • No narrative on file       Ready to quit: No  Counseling given: Yes       I have reviewed all of the above social hx, family hx, surgical hx, medications, allergies & ROS and confirm that it is accurate.  Objective   Physical Exam   Constitutional: He is oriented to person, place, and time. He appears well-developed and well-nourished.   HENT:   Head: Normocephalic and atraumatic.   Nose: Nose normal.   Eyes: Pupils are equal, round, and reactive to light. Conjunctivae and EOM are normal.   Neck: Normal range of motion.   Pulmonary/Chest: Effort normal.   Neurological: He is alert and oriented to person, place, and time.   Skin: Skin is warm.   Psychiatric: He has a normal mood and affect. His behavior is normal. Judgment and thought content normal.   Nursing note and vitals reviewed.    Ortho Exam  Ortho Exam bilateral Lower extremity exam:  Vascular: Pulses palpable, pedal hair noted, 1-2+ pitting edema, CFT brisk  Neuro: Gross sensation  intact.  Protective sensation diminished.  Derm: Normal turgor and temperature.  He does have some bulging and engorgement of varicose veins noted bilaterally area  MSK: He complains of tenderness to palpation with range of motion of the left hallux interphalangeal joint.  There is slight dorsal contracture that's partially reducible of the interphalangeal joint of the left great toe.  He complains of tenderness to palpation from the distal fibula down towards the fifth metatarsal base along the cuboid in the area of the peroneal tendons.    Assessment/Plan diabetes, neuropathy, resolved bilateral great toe wounds, cock up hallux left foot, left peroneal tendinitis  Independent Review of Radiographic Studies:      Laboratory and Other Studies:     Medical Decision Making:        Procedures  [] No procedures were performed in office today.    Wai was seen today for follow-up and follow-up.    Diagnoses and all orders for this visit:    Diabetic polyneuropathy associated with type 2 diabetes mellitus (CMS/HCC)    Type 2 diabetes mellitus with other skin ulcer, with long-term current use of insulin (CMS/[facility])    Peroneal tendinitis of left lower extremity          Recommendations/Plan:  He has a lot going on personally due to his mother being in and out of the hospital and significantly ill so he was given a home exercise program and instructed on exercises.  I recommend ice and compression.  I recommend he continue with his meloxicam and we will prescribe him topical Voltaren gel as well.  He may need bracing of some sort but if he would get his diabetic shoes and inserts that may help as well.  Follow-up here in 1 month.  May need to consider formal physical therapy but given the other things: On personally where trying to hold off on that right now.    Return in about 4 weeks (around 10/8/2018).  Patient agreeable to call or return sooner for any concerns.

## 2019-04-25 ENCOUNTER — OFFICE VISIT (OUTPATIENT)
Dept: ORTHOPEDIC SURGERY | Facility: CLINIC | Age: 44
End: 2019-04-25

## 2019-04-25 VITALS — RESPIRATION RATE: 18 BRPM | BODY MASS INDEX: 35.21 KG/M2 | WEIGHT: 260 LBS | HEIGHT: 72 IN

## 2019-04-25 DIAGNOSIS — M79.89 SWELLING OF RIGHT LOWER EXTREMITY: ICD-10-CM

## 2019-04-25 DIAGNOSIS — M20.41 HAMMERTOE OF SECOND TOE OF RIGHT FOOT: ICD-10-CM

## 2019-04-25 DIAGNOSIS — S93.104A: Primary | ICD-10-CM

## 2019-04-25 DIAGNOSIS — L84 CALLUS OF FOOT: ICD-10-CM

## 2019-04-25 DIAGNOSIS — E11.622 TYPE 2 DIABETES MELLITUS WITH OTHER SKIN ULCER, WITH LONG-TERM CURRENT USE OF INSULIN (HCC): ICD-10-CM

## 2019-04-25 DIAGNOSIS — I83.811 VARICOSE VEINS OF LEG WITH PAIN, RIGHT: ICD-10-CM

## 2019-04-25 DIAGNOSIS — M20.31 HALLUX MALLEUS OF RIGHT FOOT: ICD-10-CM

## 2019-04-25 DIAGNOSIS — Z79.4 TYPE 2 DIABETES MELLITUS WITH OTHER SKIN ULCER, WITH LONG-TERM CURRENT USE OF INSULIN (HCC): ICD-10-CM

## 2019-04-25 DIAGNOSIS — R60.0 EDEMA OF EXTREMITY OF UNKNOWN CAUSE: ICD-10-CM

## 2019-04-25 DIAGNOSIS — E11.42 DIABETIC POLYNEUROPATHY ASSOCIATED WITH TYPE 2 DIABETES MELLITUS (HCC): ICD-10-CM

## 2019-04-25 PROCEDURE — 99213 OFFICE O/P EST LOW 20 MIN: CPT | Performed by: PODIATRIST

## 2019-04-25 RX ORDER — BUSPIRONE HYDROCHLORIDE 10 MG/1
10 TABLET ORAL 2 TIMES DAILY
COMMUNITY
End: 2022-12-06 | Stop reason: ALTCHOICE

## 2019-04-25 RX ORDER — ERTUGLIFLOZIN 15 MG/1
TABLET, FILM COATED ORAL
COMMUNITY
Start: 2019-04-22 | End: 2021-08-18

## 2019-04-25 RX ORDER — PEN NEEDLE, DIABETIC 31 GX5/16"
NEEDLE, DISPOSABLE MISCELLANEOUS SEE ADMIN INSTRUCTIONS
Refills: 99 | COMMUNITY
Start: 2019-04-22 | End: 2021-08-18

## 2019-04-25 RX ORDER — AMOXICILLIN AND CLAVULANATE POTASSIUM 875; 125 MG/1; MG/1
TABLET, FILM COATED ORAL
COMMUNITY
End: 2019-05-28

## 2019-04-25 RX ORDER — INSULIN GLARGINE 100 [IU]/ML
INJECTION, SOLUTION SUBCUTANEOUS
Refills: 3 | COMMUNITY
Start: 2019-04-22 | End: 2021-08-18

## 2019-04-25 RX ORDER — BUPROPION HYDROCHLORIDE 150 MG/1
TABLET ORAL
COMMUNITY
End: 2021-08-18

## 2019-04-25 RX ORDER — DOXYCYCLINE HYCLATE 50 MG/1
CAPSULE ORAL
COMMUNITY
End: 2019-05-28

## 2019-04-25 RX ORDER — MELOXICAM 7.5 MG/1
TABLET ORAL
COMMUNITY
End: 2019-04-25

## 2019-04-25 RX ORDER — EPINEPHRINE 0.3 MG/.3ML
INJECTION SUBCUTANEOUS
COMMUNITY

## 2019-04-25 RX ORDER — HYDROXYZINE HYDROCHLORIDE 25 MG/1
25 TABLET, FILM COATED ORAL 3 TIMES DAILY PRN
Refills: 3 | COMMUNITY
Start: 2019-04-22 | End: 2021-08-18

## 2019-04-25 NOTE — PROGRESS NOTES
Subjective   Patient ID: Wai Medeiros is a 43 y.o. male   Follow-up, Edema, and Pain of the Right Foot  Patient presents to the office today after being seen last September.  He states he has been having some increased swelling and pain in his foot and ankle and leg and it usually gets fairly large.  He says he is noticed his second toe getting worse and it seems as if it may be dislocated or crooked.  He does not admit to any pain.  Was recently evaluated by his primary care physician.    History of Present Illness                                                   Review of Systems   Constitutional: Negative for diaphoresis, fever and unexpected weight change.   HENT: Negative for dental problem and sore throat.    Eyes: Negative for visual disturbance.   Respiratory: Negative for shortness of breath.    Cardiovascular: Negative for chest pain.   Gastrointestinal: Negative for abdominal pain, constipation, diarrhea, nausea and vomiting.   Genitourinary: Negative for difficulty urinating and frequency.   Musculoskeletal: Positive for arthralgias.   Neurological: Negative for headaches.   Hematological: Does not bruise/bleed easily.       Past Medical History:   Diagnosis Date   • Anxiety and depression    • Constipation    • Depression    • Diabetes mellitus (CMS/HCC)    • Fatty liver    • GERD (gastroesophageal reflux disease)    • High cholesterol    • History of methicillin resistant staphylococcus aureus (MRSA) 2013    RIGHT THIGH   • Hypertension    • Injury of back    • Neuropathy    • Pancreatitis    • Wears glasses         Past Surgical History:   Procedure Laterality Date   • APPENDECTOMY     • BACK SURGERY      L4-5 LAMINECTOMY   • CHOLECYSTECTOMY     • FOOT ARTHROPLASTY Left 4/2/2018    Procedure: Left foot Verma arthroplasty, wound debridement and grafting;  Surgeon: Sammy Antoine DPM;  Location: [facility];  Service: Orthopedics   • FOOT IRRIGATION, DEBRIDEMENT AND REPAIR Left 1/6/2018     Procedure: Left foot irrigation and debridement, wound closure, graft application;  Surgeon: Sammy Antoine DPM;  Location: [facility] OR;  Service:    • INCISION AND DRAINAGE FOOT Left 1/3/2018    Procedure: INCISION AND DRAINAGE FOOT ABSCESS, GREAT TOE;  Surgeon: Sammy Antoine DPM;  Location: [facility] OR;  Service:    • SCROTAL SURGERY     • TOE ARTHROPLASTY Right 6/5/2018    Procedure: Right foot Verma arthroplasty, wound debridement with graft application, phl repair;  Surgeon: Sammy Antoine DPM;  Location: [facility] OR;  Service: Orthopedics   • WISDOM TOOTH EXTRACTION         No Known Allergies      Current Outpatient Medications:   •  amoxicillin-clavulanate (AUGMENTIN) 875-125 MG per tablet, amoxicillin 875 mg-potassium clavulanate 125 mg tablet, Disp: , Rfl:   •  B-D ULTRAFINE III SHORT PEN 31G X 8 MM misc, by Other route See Admin Instructions. use every day, Disp: , Rfl: 99  •  buPROPion XL (WELLBUTRIN XL) 150 MG 24 hr tablet, bupropion HCl  mg 24 hr tablet, extended release, Disp: , Rfl:   •  busPIRone (BUSPAR) 30 MG tablet, buspirone 30 mg tablet, Disp: , Rfl:   •  Canagliflozin (INVOKANA) 300 MG tablet, Take 300 mg by mouth Daily., Disp: , Rfl:   •  diclofenac (VOLTAREN) 1 % gel gel, Apply 4 g topically to the appropriate area as directed 4 (Four) Times a Day., Disp: 1 tube, Rfl: 3  •  doxycycline (VIBRAMYCIN) 50 MG capsule, doxycycline hyclate 50 mg capsule, Disp: , Rfl:   •  DULoxetine (CYMBALTA) 60 MG capsule, Take 60 mg by mouth Daily., Disp: , Rfl:   •  EPINEPHrine (EPIPEN) 0.3 MG/0.3ML solution auto-injector injection, epinephrine 0.3 mg/0.3 mL injection, auto-injector, Disp: , Rfl:   •  glimepiride (AMARYL) 4 MG tablet, Take 8 mg by mouth Every Morning Before Breakfast., Disp: , Rfl:   •  HYDROcodone-acetaminophen (NORCO) 7.5-325 MG per tablet, Take 1 tablet by mouth Every 6 (Six) Hours As Needed for Moderate Pain ., Disp: 30 tablet, Rfl: 0  •  hydrOXYzine (ATARAX) 25 MG tablet, Take 25  mg by mouth 3 (Three) Times a Day As Needed., Disp: , Rfl: 3  •  Insulin Glargine (BASAGLAR KWIKPEN) 100 UNIT/ML injection pen, USE 30 UNITS DAILY, Disp: , Rfl: 3  •  lisinopril (PRINIVIL,ZESTRIL) 40 MG tablet, Take 40 mg by mouth Daily., Disp: , Rfl:   •  LYRICA 150 MG capsule, Take 1 tablet by mouth three times daily, Disp: , Rfl: 0  •  metFORMIN (GLUCOPHAGE) 1000 MG tablet, Take 1,000 mg by mouth 2 (Two) Times a Day., Disp: , Rfl: 3  •  OXYCONTIN 15 MG tablet extended-release 12 hour, Take 15 mg by mouth Every 12 (Twelve) Hours., Disp: , Rfl:   •  simvastatin (ZOCOR) 40 MG tablet, Take 40 mg by mouth every night at bedtime., Disp: , Rfl: 3  •  STEGLATRO 15 MG tablet, , Disp: , Rfl:     Family History   Problem Relation Age of Onset   • Diabetes Mother    • COPD Mother    • Diabetes Father        Social History     Socioeconomic History   • Marital status:      Spouse name: Not on file   • Number of children: Not on file   • Years of education: Not on file   • Highest education level: Not on file   Tobacco Use   • Smoking status: Current Every Day Smoker     Packs/day: 1.00     Types: Cigarettes   • Smokeless tobacco: Current User     Types: Snuff   Substance and Sexual Activity   • Alcohol use: No   • Drug use: No   • Sexual activity: Defer       Ready to quit: No  Counseling given: Yes       I have reviewed all of the above social hx, family hx, surgical hx, medications, allergies & ROS and confirm that it is accurate.  Objective   Physical Exam   Constitutional: He is oriented to person, place, and time. He appears well-developed and well-nourished.   HENT:   Head: Normocephalic and atraumatic.   Nose: Nose normal.   Eyes: Conjunctivae and EOM are normal. Pupils are equal, round, and reactive to light.   Neck: Normal range of motion.   Cardiovascular: Normal rate.   Pulmonary/Chest: Effort normal.   Neurological: He is alert and oriented to person, place, and time.   Skin: Skin is warm.   Psychiatric: He  has a normal mood and affect. His behavior is normal. Judgment and thought content normal.   Nursing note and vitals reviewed.    Ortho Exam  [unfilled]right  Lower extremity exam:  Vascular: Pulses palpable, varicose veins noted especially laterally along the entire ankle and leg.  Minimal pedal hair noted.  1-2+ edema noted.CFT brisk,  Neuro:  gross sensation intact, protective sensation decreased.  Derm: Normal turgor and temperature.  No open wounds or sores or lesions.  MSK: He still has a dorsal contracture at the interphalangeal joint of the right hallux but there is good range of motion of the MTP as well as interphalangeal joint.  Manual muscle testing normal.  Joint range of motion seem to be fairly normal but ankle joint dorsiflexion may be slightly restricted due to edema.  His second digit is dorsally subluxed or displaced and there is contracture at the DIPJ as well as some lateral deviation at the DIPJ.  The proximal and middle phalanx seem to be somewhat abducted aiming towards the hallux and then at the DIPJ it deviates laterally.  There is a small callused area on the medial DIPJ of the second digit.  Is no pain but I am able to palpate the base of the proximal phalanx and it seems as if there is a slightly positive Lockman's test.  There is no pain with this maneuver.    Assessment/Plan Right foot second digit hammertoe with valgus deviation, probable dislocated second MTPJ, concern for development of Charcot versus edema secondary to varicose veins  Independent Review of Radiographic Studies:      Laboratory and Other Studies:     Medical Decision Making:        Procedures  [] No procedures were performed in office today.    Wai was seen today for follow-up, edema and pain.    Diagnoses and all orders for this visit:    Dislocation of toe of right foot, initial encounter  -     XR Foot 3+ View Right  -     XR Ankle 3+ View Right    Swelling of right lower extremity  -     Ambulatory Referral  to Cardiology    Diabetic polyneuropathy associated with type 2 diabetes mellitus (CMS/HCC)    Type 2 diabetes mellitus with other skin ulcer, with long-term current use of insulin (CMS/HCC)    Callus of foot    Hammertoe of second toe of right foot    Hallux malleus of right foot    Edema of extremity of unknown cause    Varicose veins of leg with pain, right          Recommendations/Plan:  He does not have any pain to the second digit so I discussed with him that it is hard for me to say that we need to really do anything with that.  It is concerning for the potential for development of ulceration which would be a potential indication for surgery to straighten the toe but for right now are going to get x-rays and evaluate the bony structures and joint alignment.  We did discuss some different padding and strapping of the toes if needed but will get x-rays to determine what is needed first.  Going x-rays ankle to he does concern me somewhat for the development of Charcot given the swelling but I advised him this could be due to his heart or diet or medications or his varicose veins and I recommend we send him to Dr. Herrera as well and have him evaluated and to have a venous work-up as well as cardiac work-up.  I will see him back here in 3 to 4 weeks.  If need be we could consider adding different neuropathy medications or topical pain medications.    Return in about 3 weeks (around 5/16/2019).  Patient agreeable to call or return sooner for any concerns.

## 2019-05-08 ENCOUNTER — OFFICE VISIT (OUTPATIENT)
Dept: ORTHOPEDIC SURGERY | Facility: CLINIC | Age: 44
End: 2019-05-08

## 2019-05-08 VITALS — RESPIRATION RATE: 18 BRPM | WEIGHT: 260.14 LBS | BODY MASS INDEX: 35.24 KG/M2 | HEIGHT: 72 IN

## 2019-05-08 DIAGNOSIS — S93.104A: ICD-10-CM

## 2019-05-08 DIAGNOSIS — M20.31 HALLUX MALLEUS OF RIGHT FOOT: ICD-10-CM

## 2019-05-08 DIAGNOSIS — E11.622 TYPE 2 DIABETES MELLITUS WITH OTHER SKIN ULCER, WITH LONG-TERM CURRENT USE OF INSULIN (HCC): Primary | ICD-10-CM

## 2019-05-08 DIAGNOSIS — M20.41 HAMMERTOE OF SECOND TOE OF RIGHT FOOT: ICD-10-CM

## 2019-05-08 DIAGNOSIS — Z79.4 TYPE 2 DIABETES MELLITUS WITH OTHER SKIN ULCER, WITH LONG-TERM CURRENT USE OF INSULIN (HCC): Primary | ICD-10-CM

## 2019-05-08 DIAGNOSIS — E11.42 DIABETIC POLYNEUROPATHY ASSOCIATED WITH TYPE 2 DIABETES MELLITUS (HCC): ICD-10-CM

## 2019-05-08 PROCEDURE — 99213 OFFICE O/P EST LOW 20 MIN: CPT | Performed by: PODIATRIST

## 2019-05-08 NOTE — PROGRESS NOTES
Subjective   Patient ID: Wai Medeiros is a 43 y.o. male   he comes in for follow-up today on his right foot.  He says is starting to hurt more but still not all the time more constant.  Says it is getting some sharp pains and stinging pains at times.  He is under the weather with sinus and allergy issues and has been on antibiotics but still noticed that his foot somewhat red and swollen and painful.    History of Present Illness                                                   Review of Systems   Constitutional: Negative.    Eyes: Negative.    Respiratory:        Reports recent uri   Gastrointestinal: Negative.    Musculoskeletal: Positive for arthralgias (right foot).   Skin: Negative.        Past Medical History:   Diagnosis Date   • Anxiety and depression    • Constipation    • Depression    • Diabetes mellitus (CMS/HCC)    • Fatty liver    • GERD (gastroesophageal reflux disease)    • High cholesterol    • History of methicillin resistant staphylococcus aureus (MRSA) 2013    RIGHT THIGH   • Hypertension    • Injury of back    • Neuropathy    • Pancreatitis    • Wears glasses         Past Surgical History:   Procedure Laterality Date   • APPENDECTOMY     • BACK SURGERY      L4-5 LAMINECTOMY   • CHOLECYSTECTOMY     • FOOT ARTHROPLASTY Left 4/2/2018    Procedure: Left foot Verma arthroplasty, wound debridement and grafting;  Surgeon: Sammy Antoine DPM;  [facility];  Service: Orthopedics   • FOOT IRRIGATION, DEBRIDEMENT AND REPAIR Left 1/6/2018    Procedure: Left foot irrigation and debridement, wound closure, graft application;  Surgeon: Sammy Antoine DPM;  Location: [facility];  Service:    • INCISION AND DRAINAGE FOOT Left 1/3/2018    Procedure: INCISION AND DRAINAGE FOOT ABSCESS, GREAT TOE;  Surgeon: Sammy Antoine DPM;  Location: [facility];  Service:    • SCROTAL SURGERY     • TOE ARTHROPLASTY Right 6/5/2018    Procedure: Right foot Verma arthroplasty, wound debridement with  graft application, phl repair;  Surgeon: Sammy Antoine DPM;  Location: [facility];  Service: Orthopedics   • WISDOM TOOTH EXTRACTION         Social History     Socioeconomic History   • Marital status:      Spouse name: Not on file   • Number of children: Not on file   • Years of education: Not on file   • Highest education level: Not on file   Tobacco Use   • Smoking status: Current Every Day Smoker     Packs/day: 1.00     Types: Cigarettes   • Smokeless tobacco: Current User     Types: Snuff   Substance and Sexual Activity   • Alcohol use: No   • Drug use: No   • Sexual activity: Defer       Ready to quit: Not Answered  Counseling given: Not Answered      I have reviewed all of the above social hx, family hx, surgical hx, medications, allergies & ROS and confirm that it is accurate.    No Known Allergies    Objective   Physical Exam   Constitutional: He is oriented to person, place, and time. He appears well-developed and well-nourished.   HENT:   Head: Normocephalic and atraumatic.   Nose: Nose normal.   Eyes: Conjunctivae and EOM are normal. Pupils are equal, round, and reactive to light.   Neck: Normal range of motion.   Cardiovascular: Normal rate.   Pulmonary/Chest: Effort normal.   Neurological: He is alert and oriented to person, place, and time.   Skin: Skin is warm.   Psychiatric: He has a normal mood and affect. His behavior is normal. Judgment and thought content normal.   Nursing note and vitals reviewed.    Ortho Exam  [unfilled]  Exam of the right foot today shows that he is grossly neurovascular intact but protective sensation is decreased.  It seemed that his second digit is dorsally and medially dislocating worse.  There is blanchable erythema and edema and warmth around the second MTPJ and forefoot.  The area seems as if the second MTPJ is rigidly dorsally dislocated or subluxed but he states he has been able to dislocated and relocated at times and it pops.    Assessment/Plan   Diabetes, neuropathy, right second digit deformity with dislocated MTPJ, concern for possible Charcot  Independent Review of Radiographic Studies:      Laboratory and Other Studies:     Medical Decision Making:        Procedures  Debridement Note      Wai was seen today for follow-up and pain.    Diagnoses and all orders for this visit:    Toe joint pain, left  -     MRI Foot Right Without Contrast    Type 2 diabetes mellitus with other skin ulcer, with long-term current use of insulin (CMS/HCC)  -     MRI Foot Right Without Contrast    Dislocation of toe of right foot, initial encounter  -     MRI Foot Right Without Contrast    Diabetic polyneuropathy associated with type 2 diabetes mellitus (CMS/HCC)  -     MRI Foot Right Without Contrast          Recommendations/Plan:  He has been on antibiotics so if there were any concern for some type of hematogenous spread of osteomyelitis or septic joint this should be covered for the time being but I am going to send him from an MRI to evaluate.  I am concerned for possible Charcot with the MTPJ's but this could just be a severe plantar plate tear.  I advised him that unfortunately there is probably not going to be a cure or fix for this aside from surgery which would most likely require pinning and relocation.  I will see him back after the MRI.    No Follow-up on file.  Patient agreeable to call or return sooner for any concerns.

## 2019-05-14 ENCOUNTER — HOSPITAL ENCOUNTER (OUTPATIENT)
Dept: MRI IMAGING | Facility: HOSPITAL | Age: 44
Discharge: HOME | End: 2019-05-14
Admitting: PODIATRIST

## 2019-05-14 PROCEDURE — 73718 MRI LOWER EXTREMITY W/O DYE: CPT

## 2019-05-28 ENCOUNTER — OFFICE VISIT (OUTPATIENT)
Dept: ORTHOPEDIC SURGERY | Facility: CLINIC | Age: 44
End: 2019-05-28

## 2019-05-28 VITALS — HEIGHT: 72 IN | RESPIRATION RATE: 18 BRPM | BODY MASS INDEX: 35.28 KG/M2

## 2019-05-28 DIAGNOSIS — E11.42 DIABETIC POLYNEUROPATHY ASSOCIATED WITH TYPE 2 DIABETES MELLITUS (HCC): ICD-10-CM

## 2019-05-28 DIAGNOSIS — M25.572 TOE JOINT PAIN, LEFT: Primary | ICD-10-CM

## 2019-05-28 DIAGNOSIS — Z79.4 TYPE 2 DIABETES MELLITUS WITH OTHER SKIN ULCER, WITH LONG-TERM CURRENT USE OF INSULIN (HCC): ICD-10-CM

## 2019-05-28 DIAGNOSIS — M20.31 HALLUX MALLEUS OF RIGHT FOOT: ICD-10-CM

## 2019-05-28 DIAGNOSIS — M20.41 HAMMERTOE OF SECOND TOE OF RIGHT FOOT: ICD-10-CM

## 2019-05-28 DIAGNOSIS — E11.622 TYPE 2 DIABETES MELLITUS WITH OTHER SKIN ULCER, WITH LONG-TERM CURRENT USE OF INSULIN (HCC): ICD-10-CM

## 2019-05-28 DIAGNOSIS — S93.104A: ICD-10-CM

## 2019-05-28 PROCEDURE — 99213 OFFICE O/P EST LOW 20 MIN: CPT | Performed by: PODIATRIST

## 2019-05-28 RX ORDER — OXYCODONE HYDROCHLORIDE 15 MG/1
15 TABLET ORAL 4 TIMES DAILY PRN
Refills: 0 | COMMUNITY
Start: 2019-05-24

## 2019-05-28 NOTE — PROGRESS NOTES
Subjective   Patient ID: Wai Medeiros is a 43 y.o. male   Follow-up of the Right Foot (MRI results)  Patient comes back in today for his MRI results on his right foot.  He has issue off on his left foot stating that his left foot is been bothering him.  Denies any constitutional symptoms.    History of Present Illness                                                   Review of Systems   Constitutional: Negative.    Respiratory: Negative.    Gastrointestinal: Negative.    Musculoskeletal: Positive for arthralgias (bilateral foot).   Skin: Negative.        Past Medical History:   Diagnosis Date   • Anxiety and depression    • Constipation    • Depression    • Diabetes mellitus (CMS/HCC)    • Fatty liver    • GERD (gastroesophageal reflux disease)    • High cholesterol    • History of methicillin resistant staphylococcus aureus (MRSA) 2013    RIGHT THIGH   • Hypertension    • Injury of back    • Neuropathy    • Pancreatitis    • Wears glasses         Past Surgical History:   Procedure Laterality Date   • APPENDECTOMY     • BACK SURGERY      L4-5 LAMINECTOMY   • CHOLECYSTECTOMY     • FOOT ARTHROPLASTY Left 4/2/2018    Procedure: Left foot Verma arthroplasty, wound debridement and grafting;  Surgeon: Sammy Antoine DPM;  Location: [facility] OR;  Service: Orthopedics   • FOOT IRRIGATION, DEBRIDEMENT AND REPAIR Left 1/6/2018    Procedure: Left foot irrigation and debridement, wound closure, graft application;  Surgeon: Sammy Antoine DPM;  Location: [facility] OR;  Service:    • INCISION AND DRAINAGE FOOT Left 1/3/2018    Procedure: INCISION AND DRAINAGE FOOT ABSCESS, GREAT TOE;  Surgeon: Sammy Antoine DPM;  Location: [facility] OR;  Service:    • SCROTAL SURGERY     • TOE ARTHROPLASTY Right 6/5/2018    Procedure: Right foot Verma arthroplasty, wound debridement with graft application, phl repair;  Surgeon: Sammy Antoine DPM;  Location: [facility] OR;  Service: Orthopedics   • WISDOM TOOTH EXTRACTION         No  Known Allergies      Current Outpatient Medications:   •  B-D ULTRAFINE III SHORT PEN 31G X 8 MM misc, by Other route See Admin Instructions. use every day, Disp: , Rfl: 99  •  buPROPion XL (WELLBUTRIN XL) 150 MG 24 hr tablet, bupropion HCl  mg 24 hr tablet, extended release, Disp: , Rfl:   •  busPIRone (BUSPAR) 30 MG tablet, buspirone 30 mg tablet, Disp: , Rfl:   •  Canagliflozin (INVOKANA) 300 MG tablet, Take 300 mg by mouth Daily., Disp: , Rfl:   •  diclofenac (VOLTAREN) 1 % gel gel, Apply 4 g topically to the appropriate area as directed 4 (Four) Times a Day., Disp: 1 tube, Rfl: 3  •  DULoxetine (CYMBALTA) 60 MG capsule, Take 60 mg by mouth Daily., Disp: , Rfl:   •  EPINEPHrine (EPIPEN) 0.3 MG/0.3ML solution auto-injector injection, epinephrine 0.3 mg/0.3 mL injection, auto-injector, Disp: , Rfl:   •  glimepiride (AMARYL) 4 MG tablet, Take 8 mg by mouth Every Morning Before Breakfast., Disp: , Rfl:   •  hydrOXYzine (ATARAX) 25 MG tablet, Take 25 mg by mouth 3 (Three) Times a Day As Needed., Disp: , Rfl: 3  •  Insulin Glargine (BASAGLAR KWIKPEN) 100 UNIT/ML injection pen, USE 30 UNITS DAILY, Disp: , Rfl: 3  •  lisinopril (PRINIVIL,ZESTRIL) 40 MG tablet, Take 40 mg by mouth Daily., Disp: , Rfl:   •  LYRICA 150 MG capsule, Take 1 tablet by mouth three times daily, Disp: , Rfl: 0  •  metFORMIN (GLUCOPHAGE) 1000 MG tablet, Take 1,000 mg by mouth 2 (Two) Times a Day., Disp: , Rfl: 3  •  oxyCODONE (ROXICODONE) 10 MG tablet, TAKE 1 TABLET(S) 3 TIMES A DAY BY ORAL ROUTE AS NEEDED FOR 30 DAYS., Disp: , Rfl: 0  •  OXYCONTIN 15 MG tablet extended-release 12 hour, Take 15 mg by mouth Every 12 (Twelve) Hours., Disp: , Rfl:   •  simvastatin (ZOCOR) 40 MG tablet, Take 40 mg by mouth every night at bedtime., Disp: , Rfl: 3  •  STEGLATRO 15 MG tablet, , Disp: , Rfl:     Family History   Problem Relation Age of Onset   • Diabetes Mother    • COPD Mother    • Diabetes Father        Social History     Socioeconomic History   •  Marital status:      Spouse name: Not on file   • Number of children: Not on file   • Years of education: Not on file   • Highest education level: Not on file   Tobacco Use   • Smoking status: Current Every Day Smoker     Packs/day: 1.00     Types: Cigarettes   • Smokeless tobacco: Current User     Types: Snuff   Substance and Sexual Activity   • Alcohol use: No   • Drug use: No   • Sexual activity: Defer       Ready to quit: No  Counseling given: Yes       I have reviewed all of the above social hx, family hx, surgical hx, medications, allergies & ROS and confirm that it is accurate.  Objective   Physical Exam   Constitutional: He is oriented to person, place, and time. He appears well-developed and well-nourished.   HENT:   Head: Normocephalic and atraumatic.   Nose: Nose normal.   Eyes: Conjunctivae and EOM are normal. Pupils are equal, round, and reactive to light.   Neck: Normal range of motion.   Cardiovascular: Normal rate.   Pulmonary/Chest: Effort normal.   Neurological: He is alert and oriented to person, place, and time.   Skin: Skin is warm.   Psychiatric: He has a normal mood and affect. His behavior is normal. Judgment and thought content normal.   Nursing note and vitals reviewed.    Ortho Exam  [unfilled]bilateral  Lower extremity exam:  Vascular: Pulses palpable, minimal pewter noted, CFT brisk, minimal edema noted about the second MTPJ's bilaterally  Neuro: Gross sensation intact.  Protective sensation decreased.  Derm: Normal turgor and temperature.  No abnormal warmth.  No wounds.  MSK: His second continue continues to be dorsally and medially drifting and dislocating over the hallux.  His second toe on the left side and second MTPJ area are slightly tender to palpation and swollen today as well.  Second digit on the right foot is dorsally dislocated and nonreducible.    Assessment/Plan Diabetes, neuropathy, right second digit deformity/MTPJ dislocation, possible left second MTPJ capsular  tear  Independent Review of Radiographic Studies:    MRI report was read as osteomyelitis of the second and third metatarsal heads as well as the proximal phalanx of the second digit.  I do agree that there is bone marrow edema and reactive signal change here but there is no cortical destruction or irregularity and its my opinion this is more reactive edema from weightbearing and due to the dorsally dislocated second digit rather than osteomyelitis.  Patient is never had a wound in relationship to these mentioned areas and while osteomyelitis could be hematogenously spread that would be much less likely.  Laboratory and Other Studies:     Medical Decision Making:        Procedures  [] No procedures were performed in office today.    There are no diagnoses linked to this encounter.      Recommendations/Plan:  I examined both feet today and discussed his MRI results.  Given the small chance for underlying infection I do recommend he go ahead and see someone else to have this addressed rather than waiting.  I would recommend cultures and biopsies during the procedure and reducing and pinning the toe in place and briefly discussed my postoperative course but explained everyone does do things a little bit differently.  I will have him refer to Dr. Ortiz in [place] and have him begin starting work-up on this.  I recommend he take his disc with him.  We discussed the potential long-term problems and issues if he lets this go including ulceration on the second digit as well as the great toe.  I also recommend they closely monitor the left foot for very similar symptoms.    No Follow-up on file.  Patient agreeable to call or return sooner for any concerns.

## 2019-06-15 NOTE — DISCHARGE SUMMARY
[facility]   DISCHARGE SUMMARY      Name:  Wai Medeiros   Age:  42 y.o.  Sex:  male  :  1975  MRN:  [id number]   Visit Number:  [id number]  Primary Care Physician:  Lamine Loyd MD, MD  Date of Discharge:  2018  Admission Date:  6/3/2018      Discharge Diagnosis:       Principal Problem:    Osteomyelitis of ankle or foot, acute, right  Active Problems:    Subacute osteomyelitis of right foot    Diabetic ulcer of toe of left foot associated with type 2 diabetes mellitus    Cellulitis of left lower extremity    Neuropathy    Hypertension    High cholesterol    Anxiety and depression  Osteomyelitis of the right toe with cultures growing the staph aureus and strep B        Consults:     Consults     Date and Time Order Name Status Description    2018 0030 Inpatient Podiatry Consult Completed           Procedures Performed:    Procedure(s):  Right foot Verma arthroplasty, wound debridement with graft application, phl repair           Hospital Course:   The patient was admitted on 6/3/2018  Please see H&P for details on admission HPI and ROS.  42-year-old patient was admitted because of cellulitis of the right toe as well as a nonhealing chronic ulcer which proved to be osteomyelitis of the right toe.  3 phase bone scan was done an MRI was done which confirmed osteomyelitis of the distal phalanx.  Dr. Antoine was consulted and he did the procedure with debridement and has been started on IV antibiotic initially vancomycin and Zosyn was given.  Cultures grew staph aureus it is not MRSA and the strep B.  Based on the sensitivity to susceptible to most of the drugs so he will need IV antibiotic and the Rocephin 2 g IV nightly daily for the next 4 weeks.  Then it would be switched over to Augmentin for the next 2 more weeks so total he will be getting 6 weeks of antibiotic.  Patient the is a clinically stable to be discharged and a follow him up within next 5 days.   The he'll follow up with Dr. Antoine as per his instructions.  Home health has been consulted for IV antibiotic Rocephin 2 g IV daily for the next 4 more weeks.    Vital Signs:    Temp:  [97.4 °F (36.3 °C)-98.4 °F (36.9 °C)] 97.4 °F (36.3 °C)  Heart Rate:  [60-76] 60  Resp:  [17-18] 18  BP: (104-126)/(66-76) 114/76    Physical Exam:     General Appearance:    Alert and cooperative, not in any acute distress.   Head:    Atraumatic and normocephalic, without obvious abnormality.   Eyes:            PERRLA,  No pallor. Extra-occular movements are within normal limits.   Neck:   Supple,  No lymphglands, no bruit   Lungs:     Chest shape is normal. Breath sounds heard bilaterally equally.  No crackles or wheezing.     Heart:    Normal S1 and S2, no murmur,  No JVD   Abdomen:     Normal bowel sounds, no masses, no organomegaly. Soft        non-tender, no guarding, no rebound                tenderness   Extremities:   Moves all extremities well, no edema, no cyanosis, Right toe has dressing and so dressing has not been removed    Skin:   No  bruising or rash.   Neurologic:   Grossly non focal and moves all extrimities equally.        Pertinent Lab Results:       Results from last 7 days  Lab Units 06/06/18 0513 06/05/18  0834 06/04/18  0532   SODIUM mmol/L 143 143 142   POTASSIUM mmol/L 4.1 4.8 4.6   CHLORIDE mmol/L 102 105 106   CO2 mmol/L 27.0 29.0 27.0   BUN mg/dL 16 13 13   CREATININE mg/dL 0.60 0.70 0.70   CALCIUM mg/dL 8.6 9.5 8.9   BILIRUBIN mg/dL 0.4 0.4 0.4   ALK PHOS U/L 76 84 81   ALT (SGPT) U/L 42 41 36   AST (SGOT) U/L 25 41 25   GLUCOSE mg/dL 216* 125* 179*       Results from last 7 days  Lab Units 06/06/18 0513 06/05/18  0834 06/04/18  0532   WBC 10*3/mm3 14.74* 11.11* 15.70*   HEMOGLOBIN g/dL 15.7 16.8 16.1   HEMATOCRIT % 46.0 50.3 47.3   PLATELETS 10*3/mm3 261 238 227         Blood Culture   Date Value Ref Range Status   06/03/2018 No growth at 3 days  Preliminary     Wound Culture   Date Value Ref Range  Status   06/03/2018 Heavy growth (4+) Staphylococcus aureus (A)  Final     Comment:          06/03/2018 Streptococcus agalactiae (Group B) (A)  Final     Comment:       If Clindamycin or Erythromycin is the drug of choice, notify the laboratory within 7 days to request susceptibility testing.       Pertinent Radiology Results:  Imaging Results (most recent)     Procedure Component Value Units Date/Time    SCANNED - IMAGING [[id number]] Resulted:  06/03/18      Updated:  06/06/18 0811    MRI Foot Right With & Without Contrast [[id number]] Collected:  06/04/18 1938     Updated:  06/04/18 1939    Narrative:       FINAL REPORT    CLINICAL HISTORY:  Cellulitis and acute lymphangitis WOUND ON RT GREAT TOE, BONE  SCAN SHOWED OSTEOMYELITIS.    FINDINGS:  Comparison: Bone scan earlier today  Multiplanar multisequence  imaging of the foot was performed without the intravenous  administration of contrast.  There is moderate edema of the  first digit with cortical erosive changes of the distal phalanx  and abnormal decreased T1 and increased T2 signal consistent  with osteomyelitis.  Remaining bone marrow signal is within  normal limits.  There are multiarticular degenerative changes.  There is moderate circumferential edema.  Impression:   1.  First digit osteomyelitis  2.  Cellulitis      Impression:       Authenticated by Caitlin Ling MD on 06/04/2018 07:38:15 PM    NM Bone Scan 3 Phase [[id number]] Collected:  06/04/18 1413     Updated:  06/04/18 1418    Narrative:       PROCEDURE: NM BONE SCAN 3 PHASE-     HISTORY: Osteomyelitis suspected, foot swelling, diabetic;  M86.271-Subacute osteomyelitis, right ankle and foot     PROCEDURE: The patient was injected with 26.9 mCi of technetium 99m MDP.  Limited images of the feet were obtained in 3 phases.     COMPARISON: Radiographs dated Hillary 3, 2018.     FINDINGS: There is increased activity within the right great toe on all  3 phases which is consistent with osteomyelitis.  Name band; There is also activity  within the left great toe on all 3 phases consistent with recent  postsurgical change.     No other abnormal radiotracer activity identified.       Impression:       Findings consistent with osteomyelitis involving the right  great toe.        This report was finalized on 6/4/2018 2:15 PM by Codey Anderson M.D..    US Arterial Doppler Lower Extremity Right [[id number]] Collected:  06/04/18 1111     Updated:  06/04/18 1114    Narrative:       PROCEDURE: US ARTERIAL DOPPLER LOWER EXTREMITY  RIGHT-     HISTORY: osteomyelitis; M86.271-Subacute osteomyelitis, right ankle and  foot     PROCEDURE: Doppler waveform evaluation of the right lower extremity was  performed.     FINDINGS:      RIGHT LOWER EXTREMITY.      Velocities cm/sec :      CFA: 123  SFA Mid: 108   SFA Dist: 118  POP: 99  PT: 60  JODEE: 97  Waveforms are triphasic.          Impression:       No significant stenosis identified.     This report was finalized on 6/4/2018 11:12 AM by Codey Anderson M.D..    XR Foot 3+ View Right [[id number]] Collected:  06/03/18 1852     Updated:  06/03/18 1853    Narrative:       FINAL REPORT    CLINICAL HISTORY:  pain, drainage to great toe, hot to touch, quest osteo    FINDINGS:  There is first digit soft tissue swelling without fracture,  dislocation, radiopaque foreign body or periostitis.  If there  is concern for osteomyelitis, recommend three-phase bone scan as  it is more sensitive than plain film.  Remaining bony structures  are intact.  Impression: First digit cellulitis.  If concern for  osteomyelitis, recommend three-phase bone scan      Impression:       Authenticated by Caitlin Ling MD on 06/03/2018 06:52:50 PM                  Discharge Disposition:    Home or Self Care    Discharge Medication:     Wai Medeiros   Home Medication Instructions SOPHIA:[id number]    Printed on:06/07/18 0928   Medication Information                      acetaminophen (TYLENOL 8 HOUR)  650 MG 8 hr tablet  Take 1 tablet by mouth Every 8 (Eight) Hours As Needed for Mild Pain .             Canagliflozin (INVOKANA) 300 MG tablet  Take 300 mg by mouth Daily.             cefTRIAXone (ROCEPHIN) 2-2.22 GM-% IVPB  Infuse 50 mL into a venous catheter Daily for 28 doses.             DULoxetine (CYMBALTA) 60 MG capsule  Take 60 mg by mouth Daily.             glimepiride (AMARYL) 4 MG tablet  Take 8 mg by mouth Every Morning Before Breakfast.             ibuprofen (ADVIL,MOTRIN) 200 MG tablet  Take 200 mg by mouth Every 6 (Six) Hours As Needed for Mild Pain .             lisinopril (PRINIVIL,ZESTRIL) 40 MG tablet  Take 40 mg by mouth Daily.             LYRICA 150 MG capsule  Take 1 tablet by mouth three times daily             metFORMIN (GLUCOPHAGE) 1000 MG tablet  Take 1,000 mg by mouth 2 (Two) Times a Day.             ondansetron ODT (ZOFRAN-ODT) 4 MG disintegrating tablet  Take 1 tablet by mouth Every 8 (Eight) Hours As Needed for Nausea or Vomiting.             OXYCONTIN 15 MG tablet extended-release 12 hour  Take 15 mg by mouth Every 12 (Twelve) Hours.             simvastatin (ZOCOR) 40 MG tablet  Take 40 mg by mouth every night at bedtime.                 Discharge Diet:      consistent carb diet        Follow-up Appointments:    No future appointments.  Additional Instructions for the Follow-ups that You Need to Schedule     Ambulatory Referral to Home Health    As directed      Face to Face Visit Date:  6/7/2018    Follow-up Provider for Plan of Care?:  I will be treating the patient on an ongoing basis.  Please send me the Plan of Care for signature.    Follow-up Provider:  CHUCK QUEVEDO [[id number]]    Reason/Clinical Findings:  needs iv antibx    Describe mobility limitations that make leaving home difficult:  foot osteomyelitis    Nursing/Therapeutic Services Requested:  Skilled Nursing    Skilled nursing orders:  PICC line care/instruction Medication education    Frequency:  1 Week 1                Test Results Pending at Discharge:     Order Current Status    Anaerobic Culture - Tissue, Toe, Right In process    Tissue / Bone Culture - Tissue, Toe, Right In process    Tissue Pathology Exam In process    Blood Culture With BHUPENDRA - Blood, Preliminary result             Lamine Loyd MD  06/07/18  9:28 AM    Time: Discharge 35 min    Please note that portions of this note may have been completed with a voice recognition program. Efforts were made to edit the dictations, but occasionally words are mistranscribed.

## 2020-01-16 ENCOUNTER — TRANSCRIBE ORDERS (OUTPATIENT)
Dept: ADMINISTRATIVE | Facility: HOSPITAL | Age: 45
End: 2020-01-16

## 2020-01-16 DIAGNOSIS — M79.671 RIGHT FOOT PAIN: Primary | ICD-10-CM

## 2020-01-17 ENCOUNTER — TRANSCRIBE ORDERS (OUTPATIENT)
Dept: LAB | Facility: HOSPITAL | Age: 45
End: 2020-01-17

## 2020-01-17 ENCOUNTER — LAB (OUTPATIENT)
Dept: LAB | Facility: HOSPITAL | Age: 45
End: 2020-01-17

## 2020-01-17 DIAGNOSIS — M18.12 PRIMARY OSTEOARTHRITIS OF FIRST CARPOMETACARPAL JOINT OF LEFT HAND: Primary | ICD-10-CM

## 2020-01-17 DIAGNOSIS — M77.11 RIGHT TENNIS ELBOW: ICD-10-CM

## 2020-01-17 DIAGNOSIS — I10 HYPERTENSION, UNSPECIFIED TYPE: ICD-10-CM

## 2020-01-17 DIAGNOSIS — IMO0002 DIABETES MELLITUS TYPE 2, UNCONTROLLED, WITH COMPLICATIONS: ICD-10-CM

## 2020-01-17 DIAGNOSIS — M18.12 PRIMARY OSTEOARTHRITIS OF FIRST CARPOMETACARPAL JOINT OF LEFT HAND: ICD-10-CM

## 2020-01-17 LAB — CRP SERPL-MCNC: 0.63 MG/DL (ref 0–0.5)

## 2020-01-17 PROCEDURE — 86140 C-REACTIVE PROTEIN: CPT

## 2020-01-17 PROCEDURE — 36415 COLL VENOUS BLD VENIPUNCTURE: CPT

## 2020-01-23 ENCOUNTER — HOSPITAL ENCOUNTER (OUTPATIENT)
Dept: MRI IMAGING | Facility: HOSPITAL | Age: 45
Discharge: HOME | End: 2020-01-23
Admitting: ORTHOPAEDIC SURGERY

## 2020-01-23 DIAGNOSIS — M79.671 RIGHT FOOT PAIN: ICD-10-CM

## 2020-01-23 LAB
ALBUMIN SERPL-MCNC: 4.3 G/DL (ref 3.5–5.2)
ALBUMIN/GLOB SERPL: 1.7 G/DL
ALP SERPL-CCNC: 80 U/L (ref 39–117)
ALT SERPL W P-5'-P-CCNC: 31 U/L (ref 1–41)
ANION GAP SERPL CALCULATED.3IONS-SCNC: 13.6 MMOL/L (ref 5–15)
AST SERPL-CCNC: 21 U/L (ref 1–40)
BASOPHILS # BLD AUTO: 0.07 10*3/MM3 (ref 0–0.2)
BASOPHILS NFR BLD AUTO: 0.5 % (ref 0–1.5)
BILIRUB SERPL-MCNC: 0.3 MG/DL (ref 0.2–1.2)
BUN BLD-MCNC: 15 MG/DL (ref 6–20)
BUN/CREAT SERPL: 19.5 (ref 7–25)
CALCIUM SPEC-SCNC: 9 MG/DL (ref 8.6–10.5)
CHLORIDE SERPL-SCNC: 96 MMOL/L (ref 98–107)
CO2 SERPL-SCNC: 24.4 MMOL/L (ref 22–29)
CREAT BLD-MCNC: 0.77 MG/DL (ref 0.76–1.27)
DEPRECATED RDW RBC AUTO: 39.6 FL (ref 37–54)
EOSINOPHIL # BLD AUTO: 0.57 10*3/MM3 (ref 0–0.4)
EOSINOPHIL NFR BLD AUTO: 4.3 % (ref 0.3–6.2)
ERYTHROCYTE [DISTWIDTH] IN BLOOD BY AUTOMATED COUNT: 12 % (ref 12.3–15.4)
ERYTHROCYTE [SEDIMENTATION RATE] IN BLOOD: 2 MM/HR (ref 0–15)
GFR SERPL CREATININE-BSD FRML MDRD: 111 ML/MIN/1.73
GLOBULIN UR ELPH-MCNC: 2.5 GM/DL
GLUCOSE BLD-MCNC: 289 MG/DL (ref 65–99)
HBA1C MFR BLD: 9.3 % (ref 4.8–5.6)
HCT VFR BLD AUTO: 52.2 % (ref 37.5–51)
HGB BLD-MCNC: 18 G/DL (ref 13–17.7)
IMM GRANULOCYTES # BLD AUTO: 0.08 10*3/MM3 (ref 0–0.05)
IMM GRANULOCYTES NFR BLD AUTO: 0.6 % (ref 0–0.5)
LYMPHOCYTES # BLD AUTO: 3.08 10*3/MM3 (ref 0.7–3.1)
LYMPHOCYTES NFR BLD AUTO: 23 % (ref 19.6–45.3)
MCH RBC QN AUTO: 31 PG (ref 26.6–33)
MCHC RBC AUTO-ENTMCNC: 34.5 G/DL (ref 31.5–35.7)
MCV RBC AUTO: 90 FL (ref 79–97)
MONOCYTES # BLD AUTO: 0.68 10*3/MM3 (ref 0.1–0.9)
MONOCYTES NFR BLD AUTO: 5.1 % (ref 5–12)
NEUTROPHILS # BLD AUTO: 8.91 10*3/MM3 (ref 1.7–7)
NEUTROPHILS NFR BLD AUTO: 66.5 % (ref 42.7–76)
NRBC BLD AUTO-RTO: 0 /100 WBC (ref 0–0.2)
PLATELET # BLD AUTO: 247 10*3/MM3 (ref 140–450)
PMV BLD AUTO: 11.3 FL (ref 6–12)
POTASSIUM BLD-SCNC: 4.4 MMOL/L (ref 3.5–5.2)
PROT SERPL-MCNC: 6.8 G/DL (ref 6–8.5)
RBC # BLD AUTO: 5.8 10*6/MM3 (ref 4.14–5.8)
SODIUM BLD-SCNC: 134 MMOL/L (ref 136–145)
WBC NRBC COR # BLD: 13.39 10*3/MM3 (ref 3.4–10.8)

## 2020-01-23 PROCEDURE — 0 GADOBENATE DIMEGLUMINE 529 MG/ML SOLUTION: Performed by: ORTHOPAEDIC SURGERY

## 2020-01-23 PROCEDURE — 85025 COMPLETE CBC W/AUTO DIFF WBC: CPT

## 2020-01-23 PROCEDURE — 80053 COMPREHEN METABOLIC PANEL: CPT

## 2020-01-23 PROCEDURE — 73720 MRI LWR EXTREMITY W/O&W/DYE: CPT

## 2020-01-23 PROCEDURE — A9577 INJ MULTIHANCE: HCPCS | Performed by: ORTHOPAEDIC SURGERY

## 2020-01-23 PROCEDURE — 36415 COLL VENOUS BLD VENIPUNCTURE: CPT

## 2020-01-23 PROCEDURE — 85652 RBC SED RATE AUTOMATED: CPT

## 2020-01-23 PROCEDURE — 83036 HEMOGLOBIN GLYCOSYLATED A1C: CPT

## 2020-01-23 RX ADMIN — GADOBENATE DIMEGLUMINE 10 ML: 529 INJECTION, SOLUTION INTRAVENOUS at 12:21

## 2020-01-23 RX ADMIN — GADOBENATE DIMEGLUMINE 15 ML: 529 INJECTION, SOLUTION INTRAVENOUS at 12:21

## 2020-06-22 NOTE — PROGRESS NOTES
Patient Care Team:  NEHEMIAH Parra as PCP - General    Chief complaint left foot wound/cellulitis    Subjective .   Patient denies complaints.  Seen at bedside today with his wife.  2 days status post I&D of the left hallux and plantar foot.  Denies constitutional symptoms.      Review of Systems  All systems were reviewed and negative except for chief complaint.    History  Past Medical History:   Diagnosis Date   • Depression    • Diabetes mellitus    • Hypertension    • Injury of back    • Neuropathy    ,   Past Surgical History:   Procedure Laterality Date   • APPENDECTOMY     • BACK SURGERY     • CHOLECYSTECTOMY     • INCISION AND DRAINAGE FOOT Left 1/3/2018    Procedure: INCISION AND DRAINAGE FOOT ABSCESS, GREAT TOE;  Surgeon: Sammy Antoine DPM;  Location: [facility];  Service:    • SCROTAL SURGERY     ,   Family History   Problem Relation Age of Onset   • Diabetes Mother    • COPD Mother    • Diabetes Father    ,   Social History   Substance Use Topics   • Smoking status: Current Every Day Smoker     Packs/day: 1.00     Types: Cigarettes   • Smokeless tobacco: Never Used   • Alcohol use No   ,   Prescriptions Prior to Admission   Medication Sig Dispense Refill Last Dose   • buPROPion SR (WELLBUTRIN SR) 150 MG 12 hr tablet Take 150 mg by mouth Daily.   1/2/2018 at Unknown time   • Canagliflozin (INVOKANA) 300 MG tablet Take 300 mg by mouth Daily.   1/2/2018 at Unknown time   • doxycycline (DORYX) 100 MG enteric coated tablet Take 1 tablet by mouth 2 (Two) Times a Day for 10 days. 14 tablet 0 1/1/2018 at Unknown time   • DULoxetine (CYMBALTA) 60 MG capsule Take 60 mg by mouth Daily.   1/2/2018 at Unknown time   • furosemide (LASIX) 40 MG tablet Take 40 mg by mouth Daily.   1/1/2018 at Unknown time   • glimepiride (AMARYL) 4 MG tablet Take 8 mg by mouth Every Morning Before Breakfast.   1/1/2018 at Unknown time   • ibuprofen (ADVIL,MOTRIN) 600 MG tablet Take 1 tablet by mouth Every 8 (Eight) Hours  The patient completed a Bilateral L3, L4, L5 DR MBB #2 on 6/9/20. Attempted to leave message with patient however his mailbox was full. As Needed for Mild Pain . 12 tablet 0 Past Week at Unknown time   • lisinopril (PRINIVIL,ZESTRIL) 40 MG tablet Take 40 mg by mouth Daily.   1/1/2018 at Unknown time   • metFORMIN (GLUCOPHAGE) 500 MG tablet Take 1,000 mg by mouth 2 (Two) Times a Day With Meals.   1/2/2018 at Unknown time   • oxyCODONE-acetaminophen (PERCOCET)  MG per tablet Take 1 tablet by mouth Every 6 (Six) Hours As Needed for Moderate Pain .   1/1/2018 at Unknown time   • pregabalin (LYRICA) 100 MG capsule Take 150 mg by mouth 3 (Three) Times a Day.   1/1/2018 at Unknown time   • acetaminophen (TYLENOL 8 HOUR) 650 MG 8 hr tablet Take 1 tablet by mouth Every 8 (Eight) Hours As Needed for Mild Pain . 12 tablet 0 Unknown at Unknown time   • Insulin Glargine (TOUJEO SOLOSTAR SC) Inject 10 Units under the skin Daily.   Unknown at Unknown time   • Insulin NPH Isophane & Regular (NOVOLIN 70/30 SC) Inject 20 Units under the skin 3 (Three) Times a Day Before Meals.   Unknown at Unknown time   • ondansetron ODT (ZOFRAN-ODT) 4 MG disintegrating tablet Take 1 tablet by mouth Every 8 (Eight) Hours As Needed for Nausea or Vomiting. (Patient not taking: Reported on 1/3/2018) 8 tablet 0 Unknown at Unknown time   , Scheduled Meds:      buPROPion  mg Oral Daily   DULoxetine 60 mg Oral Daily   enoxaparin 40 mg Subcutaneous Q24H   furosemide 40 mg Oral Daily   glipiZIDE 10 mg Oral QAM AC   insulin aspart 0-14 Units Subcutaneous 4x Daily AC & at Bedtime   lisinopril 40 mg Oral Daily   metFORMIN 500 mg Oral BID With Meals   piperacillin-tazobactam 3.375 g Intravenous Q6H   pregabalin 150 mg Oral TID   vancomycin 2,000 mg Intravenous Q12H   Pharmacy Consult  Does not apply Once   , Continuous Infusions:      Pharmacy to dose vancomycin    Pharmacy to Dose Zosyn    , PRN Meds:  •  acetaminophen  •  dextrose  •  dextrose  •  glucagon (human recombinant)  •  ondansetron  •  ondansetron ODT  •  oxyCODONE-acetaminophen  •  Pharmacy to dose vancomycin  •   "Pharmacy to Dose Zosyn  •  sodium chloride  •  sodium chloride and Allergies:  Review of patient's allergies indicates no known allergies.    Objective     Vital Signs   /79  Pulse 85  Temp 98.6 °F (37 °C)  Resp 18  Ht 182.9 cm (72\")  Wt 119 kg (263 lb)  SpO2 98%  BMI 35.67 kg/m2    Physical Exam:AAO ×3, NAD, afebrile vital signs stable  PERRLA, cranial nerves II through XII intact  Surgical dressing left intact to the left foot.  Capillary refill time is brisk to exposed digits.  Gross sensations intact.  No strikethrough on the dressing.  Erythema and edema and warmth that were present up the leg has receded.       Results Review: Culture still positive for group B strep.  White blood cell count 11.48  Imaging Results:none    Assessment/Plan   Left great toe cellulitis, diabetic foot wound, possible osteomyelitis  Active Problems:    Diabetic ulcer of toe of left foot associated with type 2 diabetes mellitus    Cellulitis of left lower extremity  I have discussed with the patient and his wife the plan for surgery tomorrow.  He has a PICC line and antibiotics arranged.  We'll plan on hopeful wound closure tomorrow and discharged more afterwards.  Surgical orders have been placed.  Patient will be placed nothing by mouth after midnight.  We'll proceed with left foot irrigation debridement with hopeful wound closure and possible grafting tomorrow.  Antibiotics and DVT prophylaxis per primary.            Sammy Antoine DPM  01/05/18  1:12 PM      "

## 2021-08-18 ENCOUNTER — OFFICE VISIT (OUTPATIENT)
Dept: NEUROSURGERY | Facility: CLINIC | Age: 46
End: 2021-08-18

## 2021-08-18 VITALS — WEIGHT: 273.6 LBS | HEIGHT: 72 IN | BODY MASS INDEX: 37.06 KG/M2 | TEMPERATURE: 97.1 F

## 2021-08-18 DIAGNOSIS — M96.1 LUMBAR POSTLAMINECTOMY SYNDROME: Primary | ICD-10-CM

## 2021-08-18 DIAGNOSIS — G95.9 CERVICAL MYELOPATHY (HCC): ICD-10-CM

## 2021-08-18 DIAGNOSIS — M51.36 DDD (DEGENERATIVE DISC DISEASE), LUMBAR: ICD-10-CM

## 2021-08-18 PROCEDURE — 99204 OFFICE O/P NEW MOD 45 MIN: CPT | Performed by: NEUROLOGICAL SURGERY

## 2021-08-18 RX ORDER — PIOGLITAZONEHYDROCHLORIDE 30 MG/1
30 TABLET ORAL DAILY
COMMUNITY

## 2021-08-18 RX ORDER — IBUPROFEN 200 MG
200 TABLET ORAL 2 TIMES DAILY
COMMUNITY

## 2021-08-18 NOTE — PROGRESS NOTES
Patient: Wai Medeiros  : 1975    Primary Care Provider: Rodríguez Mireles MD    Requesting Provider: As above        History    Chief Complaint: Low back and left leg pain.    History of Present Illness: Mr. Medeiros is a 45-year-old former  who has had progressive low back and left leg pain.  He had lumbar surgery in  which was not helpful.  Pain extends from his back into the posterior left lower extremity to the foot.  He also has pain on the front of his left thigh.  Occasionally he has left testicular pain.  Symptoms are worse with standing.  He gets relief by lying on his right side on a hard surface.  He has no right leg symptoms.  He has been treated with pain medicines and injections.  His pain doctor has at least raised the question of a spinal cord stimulator.  He has no bowel or bladder dysfunction.    Review of Systems   Constitutional: Positive for activity change and fatigue. Negative for appetite change, chills, diaphoresis, fever and unexpected weight change.   HENT: Negative for congestion, dental problem, drooling, ear discharge, ear pain, facial swelling, hearing loss, mouth sores, nosebleeds, postnasal drip, rhinorrhea, sinus pressure, sinus pain, sneezing, sore throat, tinnitus, trouble swallowing and voice change.    Eyes: Positive for visual disturbance. Negative for photophobia, pain, discharge, redness and itching.   Respiratory: Negative for apnea, cough, choking, chest tightness, shortness of breath, wheezing and stridor.    Cardiovascular: Positive for leg swelling. Negative for chest pain and palpitations.   Gastrointestinal: Positive for constipation. Negative for abdominal distention, abdominal pain, anal bleeding, blood in stool, diarrhea, nausea, rectal pain and vomiting.   Endocrine: Negative for cold intolerance, heat intolerance, polydipsia, polyphagia and polyuria.   Genitourinary: Negative for decreased urine volume, difficulty urinating,  "dysuria, enuresis, flank pain, frequency, genital sores, hematuria and urgency.   Musculoskeletal: Positive for back pain and joint swelling. Negative for arthralgias, gait problem, myalgias, neck pain and neck stiffness.   Skin: Negative for color change, pallor, rash and wound.   Allergic/Immunologic: Negative for environmental allergies, food allergies and immunocompromised state.   Neurological: Positive for weakness, numbness and headaches. Negative for dizziness, tremors, seizures, syncope, facial asymmetry, speech difficulty and light-headedness.   Hematological: Negative for adenopathy. Does not bruise/bleed easily.   Psychiatric/Behavioral: Positive for agitation and decreased concentration. Negative for behavioral problems, confusion, dysphoric mood, hallucinations, self-injury, sleep disturbance and suicidal ideas. The patient is nervous/anxious. The patient is not hyperactive.    All other systems reviewed and are negative.      The patient's past medical history, past surgical history, family history, and social history have been reviewed at length in the electronic medical record.    Physical Exam:   Temp 97.1 °F (36.2 °C)   Ht 182.9 cm (72\")   Wt 124 kg (273 lb 9.6 oz)   BMI 37.11 kg/m²   CONSTITUTIONAL: Patient is well-nourished, pleasant and appears stated age.  CV: Heart regular rate and rhythm without murmur, rub, or gallop.  PULMONARY: Lungs are clear to ascultation.  MUSCULOSKELETAL:  Straight leg raising is negative.  Milo's Sign is negative.  ROM in the low back is normal.  Tenderness in the back to palpation is not observed.  NEUROLOGICAL:  Orientation, memory, attention span, language function, and cognition have been examined and are intact.  Strength is intact in the lower extremities to direct testing.  Muscle tone is normal throughout.  Station and gait are normal.  Sensation is diminished in a stocking distribution.  Deep tendon reflexes are brisk throughout, 3+.  Ronel sign is " positive in the right hand.  Coordination is intact.      Medical Decision Making    Data Review:   (All imaging studies were personally reviewed unless stated otherwise)  MRI of the lumbar spine dated 4/20/2021 demonstrates a sizable L4-5 laminectomy defect.  There is soft tissue in the recess on the left at that level.  There is some disc bulging at L3-4.  Broad-based disc-osteophyte narrows both recesses at L5-S1.    Electrodiagnostic studies dated 7/1/2021 suggest acute bilateral S1 radiculopathies and a chronic left multilevel lumbar laminectomy and chronic right L4-5 radiculopathy.  There is said to be a coexisting severe polyneuropathy that has worsened.    Diagnosis:   1.  Lumbar postlaminectomy syndrome.  2.  Lumbar radiculopathy.  3.  Cervical myelopathy.  4.  Peripheral neuropathy.    Treatment Options:   I am going to check a new MRI of the lumbar spine with and without gadolinium.  Some of this patient's symptoms could be emanating from the left L5-S1 level.  However, that would not explain the entirety of his left leg symptoms.  I am going to check flexion-extension upright lateral lumbar spine x-rays as well.  He also has long track signs on examination and as such I am going to check a cervical MRI study.  If we do not find anything that can be addressed surgically then I would probably consider a trial of a spinal cord stimulator.       Diagnosis Plan   1. Lumbar postlaminectomy syndrome  MRI Lumbar Spine With & Without Contrast    XR Spine Lumbar Complete With Flex & Ext   2. DDD (degenerative disc disease), lumbar     3. Cervical myelopathy (CMS/HCC)  MRI Cervical Spine Without Contrast       Scribed for Maxwell Torres MD by TUCKER Salas 8/18/2021 10:20 EDT      I, Dr. Torres, personally performed the services described in the documentation, as scribed in my presence, and it is both accurate and complete.

## 2021-10-05 ENCOUNTER — TELEPHONE (OUTPATIENT)
Dept: NEUROSURGERY | Facility: CLINIC | Age: 46
End: 2021-10-05

## 2021-10-05 DIAGNOSIS — M51.36 DDD (DEGENERATIVE DISC DISEASE), LUMBAR: ICD-10-CM

## 2021-10-05 DIAGNOSIS — F40.240 CLAUSTROPHOBIA: ICD-10-CM

## 2021-10-05 DIAGNOSIS — M96.1 LUMBAR POSTLAMINECTOMY SYNDROME: Primary | ICD-10-CM

## 2021-10-05 RX ORDER — DIAZEPAM 10 MG/1
TABLET ORAL
Qty: 2 TABLET | Refills: 0 | Status: SHIPPED | OUTPATIENT
Start: 2021-10-05 | End: 2021-11-09

## 2021-10-05 NOTE — TELEPHONE ENCOUNTER
Provider:  Brian  Caller: patient  Time of call:     Phone #:    Surgery:    Surgery Date:    Last visit:  08/18/21   Next visit: 10/12/21    RENNY:         Reason for call:   Patient is scheduled for 2 MRI's and requests a sedative for claustrophobic.

## 2021-10-05 NOTE — TELEPHONE ENCOUNTER
Mr. Medeiros is having 2 MRI this Friday 10-9 and is claustrophobic.  He said clinical told him to call when he was here last so that they could call him something in to relax him.  Please call patient. Harlan

## 2021-10-08 ENCOUNTER — APPOINTMENT (OUTPATIENT)
Dept: MRI IMAGING | Facility: HOSPITAL | Age: 46
End: 2021-10-08

## 2021-10-08 ENCOUNTER — HOSPITAL ENCOUNTER (OUTPATIENT)
Dept: MRI IMAGING | Facility: HOSPITAL | Age: 46
End: 2021-10-08

## 2021-11-02 ENCOUNTER — HOSPITAL ENCOUNTER (OUTPATIENT)
Dept: MRI IMAGING | Facility: HOSPITAL | Age: 46
Discharge: HOME | End: 2021-11-02

## 2021-11-02 ENCOUNTER — HOSPITAL ENCOUNTER (OUTPATIENT)
Dept: GENERAL RADIOLOGY | Facility: HOSPITAL | Age: 46
Discharge: HOME | End: 2021-11-02

## 2021-11-02 DIAGNOSIS — G95.9 CERVICAL MYELOPATHY (HCC): ICD-10-CM

## 2021-11-02 DIAGNOSIS — M96.1 LUMBAR POSTLAMINECTOMY SYNDROME: ICD-10-CM

## 2021-11-02 PROCEDURE — 72114 X-RAY EXAM L-S SPINE BENDING: CPT

## 2021-11-02 PROCEDURE — 0 GADOBENATE DIMEGLUMINE 529 MG/ML SOLUTION: Performed by: NEUROLOGICAL SURGERY

## 2021-11-02 PROCEDURE — 72158 MRI LUMBAR SPINE W/O & W/DYE: CPT

## 2021-11-02 PROCEDURE — A9577 INJ MULTIHANCE: HCPCS | Performed by: NEUROLOGICAL SURGERY

## 2021-11-02 RX ADMIN — GADOBENATE DIMEGLUMINE 20 ML: 529 INJECTION, SOLUTION INTRAVENOUS at 19:24

## 2021-11-09 ENCOUNTER — OFFICE VISIT (OUTPATIENT)
Dept: NEUROSURGERY | Facility: CLINIC | Age: 46
End: 2021-11-09

## 2021-11-09 VITALS — HEIGHT: 72 IN | RESPIRATION RATE: 16 BRPM | TEMPERATURE: 97.8 F | BODY MASS INDEX: 36.57 KG/M2 | WEIGHT: 270 LBS

## 2021-11-09 DIAGNOSIS — M51.36 DDD (DEGENERATIVE DISC DISEASE), LUMBAR: ICD-10-CM

## 2021-11-09 DIAGNOSIS — M96.1 LUMBAR POST-LAMINECTOMY SYNDROME: Primary | ICD-10-CM

## 2021-11-09 DIAGNOSIS — R29.2 HYPERREFLEXIA: Primary | ICD-10-CM

## 2021-11-09 DIAGNOSIS — Z72.0 TOBACCO ABUSE: ICD-10-CM

## 2021-11-09 DIAGNOSIS — R29.2 HYPERREFLEXIA: ICD-10-CM

## 2021-11-09 PROCEDURE — 99213 OFFICE O/P EST LOW 20 MIN: CPT | Performed by: NEUROLOGICAL SURGERY

## 2021-11-09 RX ORDER — DIAZEPAM 10 MG/1
10 TABLET ORAL DAILY
Qty: 2 TABLET | Refills: 0 | Status: SHIPPED | OUTPATIENT
Start: 2021-11-09

## 2021-11-09 NOTE — PROGRESS NOTES
Patient: Wai Medeiros  : 1975    Primary Care Provider: Rodríguez Mireles MD    Requesting Provider: As above        History    Chief Complaint:   1.  Low back and left leg pain.  2.  Long track signs on examination.    History of Present Illness: Mr. Medeiros is a 45-year-old former  who has had progressive low back and left leg pain.  He had lumbar surgery in  which was not helpful.  Pain extends from his back into the posterior left lower extremity to the foot.  He also has pain on the front of his left thigh.  Previously reported testicular pain seems to have flared up.  Symptoms are worse with standing.  He gets relief by lying on his right side on a hard surface.  He has no right leg symptoms.  He has been treated with pain medicines and injections.  Dr. Ruffin has at least raised the question of a spinal cord stimulator.  He has no bowel or bladder dysfunction.  About 50% of his pain in his low back.  About 50% involves the left hip and posterior thigh.       Review of Systems   Constitutional: Negative for activity change, appetite change, chills, diaphoresis, fatigue, fever and unexpected weight change.   HENT: Negative for congestion, dental problem, drooling, ear discharge, ear pain, facial swelling, hearing loss, mouth sores, nosebleeds, postnasal drip, rhinorrhea, sinus pressure, sneezing, sore throat, tinnitus, trouble swallowing and voice change.    Eyes: Negative for photophobia, pain, discharge, redness, itching and visual disturbance.   Respiratory: Negative for apnea, cough, choking, chest tightness, shortness of breath, wheezing and stridor.    Cardiovascular: Negative for chest pain, palpitations and leg swelling.   Gastrointestinal: Negative for abdominal distention, abdominal pain, anal bleeding, blood in stool, constipation, diarrhea, nausea, rectal pain and vomiting.   Endocrine: Negative for cold intolerance, heat intolerance, polydipsia, polyphagia  "and polyuria.   Genitourinary: Negative for decreased urine volume, difficulty urinating, dysuria, enuresis, flank pain, frequency, genital sores, hematuria and urgency.   Musculoskeletal: Negative for arthralgias, back pain, gait problem, joint swelling, myalgias, neck pain and neck stiffness.   Skin: Negative for color change, pallor, rash and wound.   Allergic/Immunologic: Negative for environmental allergies, food allergies and immunocompromised state.   Neurological: Negative for dizziness, tremors, seizures, syncope, facial asymmetry, speech difficulty, weakness, light-headedness, numbness and headaches.   Hematological: Negative for adenopathy. Does not bruise/bleed easily.   Psychiatric/Behavioral: Negative for agitation, behavioral problems, confusion, decreased concentration, dysphoric mood, hallucinations, self-injury, sleep disturbance and suicidal ideas. The patient is not nervous/anxious and is not hyperactive.    All other systems reviewed and are negative.      The patient's past medical history, past surgical history, family history, and social history have been reviewed at length in the electronic medical record.    Physical Exam:   Temp 97.8 °F (36.6 °C) (Infrared)   Resp 16   Ht 182.9 cm (72\")   Wt 122 kg (270 lb)   BMI 36.62 kg/m²   MUSCULOSKELETAL:  Straight leg raising is negative.  Milo's Sign is negative.  ROM in the low back is normal.  Tenderness in the back to palpation is not observed.  NEUROLOGICAL:  Strength is intact in the lower extremities to direct testing.  Muscle tone is normal throughout.  Station and gait are normal.  Sensation is intact to light touch testing throughout.  Deep tendon reflexes are 3+ and symmetrical.  Ronel sign is mildly positive bilaterally.  Coordination is intact.      Medical Decision Making    Data Review:   (All imaging studies were personally reviewed unless stated otherwise)  New lumbar MRI with and without gadolinium demonstrates laminectomy " defect at L4-5.  There are some broad-based disc bulging that narrows the recesses at L5-S1 bilaterally.  Diffuse degenerative disc disease and disc bulging throughout is identified.    Electrodiagnostic studies dated 7/1/2021 performed by Dr. Kelly suggest acute bilateral S1 radiculopathies and chronic left multilevel lumbar radiculopathies and chronic right L4-5 radiculopathy.  There is said to be a coexisting severe polyneuropathy that has progressed.    Diagnosis:   1.  Lumbar postlaminectomy syndrome.  2.  Lumbar radiculopathy.  3.  Cervical myelopathy.  4.  Peripheral neuropathy.      Treatment Options:   The patient did not get his cervical MRI study performed because he became too claustrophobic.  The patient has rather diffuse symptoms involving his back and left leg.  There are diffuse changes throughout the lumbar spine MRI study.  At this juncture I would not recommend lumbar surgical intervention.  I think I would push ahead with a trial of the spinal cord stimulator.  I have reordered the cervical MRI.  He will take Valium 20 mg p.o. 1/2-hour before the study.  He did take this amount of Valium before the prior study but half of it was taken just a few minutes before the MRI study.  He will follow-up with the cervical study once it is completed.       Diagnosis Plan   1. Lumbar post-laminectomy syndrome     2. DDD (degenerative disc disease), lumbar     3. Tobacco abuse     4. Hyperreflexia         Scribed for Maxwell Torres MD by Elizabeth Davison CMA on 11/9/2021 10:40 EST       I, Dr. Torres, personally performed the services described in the documentation, as scribed in my presence, and it is both accurate and complete.

## 2021-12-09 ENCOUNTER — TRANSCRIBE ORDERS (OUTPATIENT)
Dept: ADMINISTRATIVE | Facility: HOSPITAL | Age: 46
End: 2021-12-09

## 2021-12-09 DIAGNOSIS — M50.00 CERVICAL DISC DISORDER WITH MYELOPATHY, UNSPECIFIED CERVICAL REGION: Primary | ICD-10-CM

## 2022-12-04 ENCOUNTER — APPOINTMENT (OUTPATIENT)
Dept: CT IMAGING | Facility: HOSPITAL | Age: 47
End: 2022-12-04

## 2022-12-04 ENCOUNTER — HOSPITAL ENCOUNTER (EMERGENCY)
Facility: HOSPITAL | Age: 47
Discharge: HOME | End: 2022-12-04
Attending: EMERGENCY MEDICINE | Admitting: EMERGENCY MEDICINE

## 2022-12-04 VITALS
WEIGHT: 260 LBS | TEMPERATURE: 99.3 F | HEIGHT: 73 IN | BODY MASS INDEX: 34.46 KG/M2 | SYSTOLIC BLOOD PRESSURE: 136 MMHG | DIASTOLIC BLOOD PRESSURE: 82 MMHG | HEART RATE: 94 BPM | OXYGEN SATURATION: 98 % | RESPIRATION RATE: 20 BRPM

## 2022-12-04 DIAGNOSIS — R73.9 HYPERGLYCEMIA: ICD-10-CM

## 2022-12-04 DIAGNOSIS — L02.31 LEFT BUTTOCK ABSCESS: Primary | ICD-10-CM

## 2022-12-04 LAB
ALBUMIN SERPL-MCNC: 4.1 G/DL (ref 3.5–5.2)
ALBUMIN/GLOB SERPL: 1.4 G/DL
ALP SERPL-CCNC: 87 U/L (ref 39–117)
ALT SERPL W P-5'-P-CCNC: 34 U/L (ref 1–41)
ANION GAP SERPL CALCULATED.3IONS-SCNC: 11.6 MMOL/L (ref 5–15)
AST SERPL-CCNC: 18 U/L (ref 1–40)
BASOPHILS # BLD AUTO: 0.08 10*3/MM3 (ref 0–0.2)
BASOPHILS NFR BLD AUTO: 0.4 % (ref 0–1.5)
BILIRUB SERPL-MCNC: 0.4 MG/DL (ref 0–1.2)
BUN SERPL-MCNC: 15 MG/DL (ref 6–20)
BUN/CREAT SERPL: 17.9 (ref 7–25)
CALCIUM SPEC-SCNC: 8.6 MG/DL (ref 8.6–10.5)
CHLORIDE SERPL-SCNC: 93 MMOL/L (ref 98–107)
CO2 SERPL-SCNC: 25.4 MMOL/L (ref 22–29)
CREAT SERPL-MCNC: 0.84 MG/DL (ref 0.76–1.27)
DEPRECATED RDW RBC AUTO: 39.9 FL (ref 37–54)
EGFRCR SERPLBLD CKD-EPI 2021: 109.6 ML/MIN/1.73
EOSINOPHIL # BLD AUTO: 0.32 10*3/MM3 (ref 0–0.4)
EOSINOPHIL NFR BLD AUTO: 1.5 % (ref 0.3–6.2)
ERYTHROCYTE [DISTWIDTH] IN BLOOD BY AUTOMATED COUNT: 12.3 % (ref 12.3–15.4)
GLOBULIN UR ELPH-MCNC: 2.9 GM/DL
GLUCOSE BLDC GLUCOMTR-MCNC: 408 MG/DL (ref 70–130)
GLUCOSE SERPL-MCNC: 500 MG/DL (ref 65–99)
HCT VFR BLD AUTO: 45.9 % (ref 37.5–51)
HGB BLD-MCNC: 16.3 G/DL (ref 13–17.7)
HOLD SPECIMEN: NORMAL
HOLD SPECIMEN: NORMAL
IMM GRANULOCYTES # BLD AUTO: 0.08 10*3/MM3 (ref 0–0.05)
IMM GRANULOCYTES NFR BLD AUTO: 0.4 % (ref 0–0.5)
LYMPHOCYTES # BLD AUTO: 2.72 10*3/MM3 (ref 0.7–3.1)
LYMPHOCYTES NFR BLD AUTO: 12.8 % (ref 19.6–45.3)
MCH RBC QN AUTO: 31.3 PG (ref 26.6–33)
MCHC RBC AUTO-ENTMCNC: 35.5 G/DL (ref 31.5–35.7)
MCV RBC AUTO: 88.3 FL (ref 79–97)
MONOCYTES # BLD AUTO: 1.36 10*3/MM3 (ref 0.1–0.9)
MONOCYTES NFR BLD AUTO: 6.4 % (ref 5–12)
NEUTROPHILS NFR BLD AUTO: 16.77 10*3/MM3 (ref 1.7–7)
NEUTROPHILS NFR BLD AUTO: 78.5 % (ref 42.7–76)
NRBC BLD AUTO-RTO: 0 /100 WBC (ref 0–0.2)
PLATELET # BLD AUTO: 236 10*3/MM3 (ref 140–450)
PMV BLD AUTO: 10.6 FL (ref 6–12)
POTASSIUM SERPL-SCNC: 4.4 MMOL/L (ref 3.5–5.2)
PROT SERPL-MCNC: 7 G/DL (ref 6–8.5)
RBC # BLD AUTO: 5.2 10*6/MM3 (ref 4.14–5.8)
SODIUM SERPL-SCNC: 130 MMOL/L (ref 136–145)
WBC NRBC COR # BLD: 21.33 10*3/MM3 (ref 3.4–10.8)
WHOLE BLOOD HOLD COAG: NORMAL
WHOLE BLOOD HOLD SPECIMEN: NORMAL

## 2022-12-04 PROCEDURE — 85025 COMPLETE CBC W/AUTO DIFF WBC: CPT | Performed by: EMERGENCY MEDICINE

## 2022-12-04 PROCEDURE — 99283 EMERGENCY DEPT VISIT LOW MDM: CPT

## 2022-12-04 PROCEDURE — 0 LIDOCAINE 1 % SOLUTION: Performed by: EMERGENCY MEDICINE

## 2022-12-04 PROCEDURE — 87147 CULTURE TYPE IMMUNOLOGIC: CPT | Performed by: EMERGENCY MEDICINE

## 2022-12-04 PROCEDURE — 74177 CT ABD & PELVIS W/CONTRAST: CPT

## 2022-12-04 PROCEDURE — 87205 SMEAR GRAM STAIN: CPT | Performed by: EMERGENCY MEDICINE

## 2022-12-04 PROCEDURE — 63710000001 INSULIN ASPART PER 5 UNITS: Performed by: EMERGENCY MEDICINE

## 2022-12-04 PROCEDURE — 82962 GLUCOSE BLOOD TEST: CPT

## 2022-12-04 PROCEDURE — 87070 CULTURE OTHR SPECIMN AEROBIC: CPT | Performed by: EMERGENCY MEDICINE

## 2022-12-04 PROCEDURE — 80053 COMPREHEN METABOLIC PANEL: CPT | Performed by: EMERGENCY MEDICINE

## 2022-12-04 PROCEDURE — 25010000002 IOPAMIDOL 61 % SOLUTION: Performed by: EMERGENCY MEDICINE

## 2022-12-04 RX ORDER — METRONIDAZOLE 500 MG/1
500 TABLET ORAL 3 TIMES DAILY
Qty: 21 TABLET | Refills: 0 | Status: SHIPPED | OUTPATIENT
Start: 2022-12-04

## 2022-12-04 RX ORDER — INSULIN ASPART 100 [IU]/ML
10 INJECTION, SOLUTION INTRAVENOUS; SUBCUTANEOUS ONCE
Status: COMPLETED | OUTPATIENT
Start: 2022-12-04 | End: 2022-12-04

## 2022-12-04 RX ORDER — METRONIDAZOLE 500 MG/1
500 TABLET ORAL ONCE
Status: COMPLETED | OUTPATIENT
Start: 2022-12-04 | End: 2022-12-04

## 2022-12-04 RX ORDER — OXYCODONE HYDROCHLORIDE 15 MG/1
15 TABLET ORAL
COMMUNITY

## 2022-12-04 RX ORDER — LIDOCAINE HYDROCHLORIDE 10 MG/ML
10 INJECTION, SOLUTION INFILTRATION; PERINEURAL ONCE
Status: COMPLETED | OUTPATIENT
Start: 2022-12-04 | End: 2022-12-04

## 2022-12-04 RX ORDER — LEVOFLOXACIN 750 MG/1
750 TABLET ORAL ONCE
Status: COMPLETED | OUTPATIENT
Start: 2022-12-04 | End: 2022-12-04

## 2022-12-04 RX ORDER — LEVOFLOXACIN 750 MG/1
750 TABLET ORAL DAILY
Qty: 7 TABLET | Refills: 0 | Status: SHIPPED | OUTPATIENT
Start: 2022-12-04

## 2022-12-04 RX ADMIN — SODIUM CHLORIDE, POTASSIUM CHLORIDE, SODIUM LACTATE AND CALCIUM CHLORIDE 1000 ML: 600; 310; 30; 20 INJECTION, SOLUTION INTRAVENOUS at 01:38

## 2022-12-04 RX ADMIN — LIDOCAINE HYDROCHLORIDE 10 ML: 10 INJECTION, SOLUTION INFILTRATION; PERINEURAL at 02:47

## 2022-12-04 RX ADMIN — METRONIDAZOLE 500 MG: 500 TABLET ORAL at 02:56

## 2022-12-04 RX ADMIN — INSULIN ASPART 10 UNITS: 100 INJECTION, SOLUTION INTRAVENOUS; SUBCUTANEOUS at 01:38

## 2022-12-04 RX ADMIN — LEVOFLOXACIN 750 MG: 750 TABLET, FILM COATED ORAL at 02:56

## 2022-12-04 RX ADMIN — IOPAMIDOL 100 ML: 612 INJECTION, SOLUTION INTRAVENOUS at 01:26

## 2022-12-06 ENCOUNTER — OFFICE VISIT (OUTPATIENT)
Dept: SURGERY | Facility: CLINIC | Age: 47
End: 2022-12-06

## 2022-12-06 VITALS
HEART RATE: 104 BPM | DIASTOLIC BLOOD PRESSURE: 64 MMHG | OXYGEN SATURATION: 97 % | BODY MASS INDEX: 34.46 KG/M2 | RESPIRATION RATE: 18 BRPM | SYSTOLIC BLOOD PRESSURE: 120 MMHG | WEIGHT: 260 LBS | HEIGHT: 73 IN | TEMPERATURE: 96 F

## 2022-12-06 DIAGNOSIS — K61.1 PERIRECTAL ABSCESS: Primary | ICD-10-CM

## 2022-12-06 PROCEDURE — 46040 I&D ISCHIORCT&/PERIRCT ABSC: CPT | Performed by: SURGERY

## 2022-12-06 PROCEDURE — 99204 OFFICE O/P NEW MOD 45 MIN: CPT | Performed by: SURGERY

## 2022-12-06 RX ORDER — DULOXETIN HYDROCHLORIDE 30 MG/1
30 CAPSULE, DELAYED RELEASE ORAL DAILY
COMMUNITY
Start: 2022-11-30

## 2022-12-06 RX ORDER — BUSPIRONE HYDROCHLORIDE 15 MG/1
15 TABLET ORAL 3 TIMES DAILY
COMMUNITY
Start: 2022-12-05 | End: 2023-03-06

## 2022-12-06 RX ORDER — GENTAMICIN SULFATE 1 MG/G
OINTMENT TOPICAL
COMMUNITY

## 2022-12-06 RX ORDER — HYDROXYZINE PAMOATE 25 MG/1
CAPSULE ORAL
COMMUNITY
Start: 2022-11-30

## 2022-12-06 RX ORDER — CEPHALEXIN 500 MG/1
CAPSULE ORAL
COMMUNITY

## 2022-12-06 RX ORDER — ATORVASTATIN CALCIUM 20 MG/1
TABLET, FILM COATED ORAL
COMMUNITY

## 2022-12-06 RX ORDER — IPRATROPIUM/ALBUTEROL SULFATE 20-100 MCG
MIST INHALER (GRAM) INHALATION
COMMUNITY

## 2022-12-06 RX ORDER — COVID-19 ANTIGEN TEST
KIT MISCELLANEOUS
COMMUNITY

## 2022-12-06 RX ORDER — BUSPIRONE HYDROCHLORIDE 15 MG/1
15 TABLET ORAL 3 TIMES DAILY
COMMUNITY
Start: 2022-12-05 | End: 2022-12-06 | Stop reason: ALTCHOICE

## 2022-12-06 NOTE — PROGRESS NOTES
Patient: Wai Medeiros    YOB: 1975    Date: 12/06/2022    Primary Care Provider: Rodríguez Mireles MD    Chief Complaint   Patient presents with   • Cellulitis       SUBJECTIVE:    History of present illness: Patient has a history of necrotizing fascitis.  Patient is here for evaluation of an abscess on his buttock.  Patient was seen in the emergency department at [facility] at which time he had a CT scan of the abdomen and pelvis which showed left buttock cellulitis and left groin prominent lymph node.  Patient was given Rx's for Levaquin and Flagyl.    The patient has had a distant history in the past of necrotizing fasciitis from perirectal abscess.  He complains of discomfort in the left buttock, associated with erythema, associated with swelling, worse with pressure, pain is sharp in nature, present over the past week, has been seen in the emergency room recently, has had some associated drainage, nonradiating.    The following portions of the patient's history were reviewed and updated as appropriate: allergies, current medications, past family history, past medical history, past social history, past surgical history and problem list.      Review of Systems   Constitutional: Negative for chills, fever and unexpected weight change.   HENT: Negative for trouble swallowing and voice change.    Eyes: Negative for visual disturbance.   Respiratory: Negative for apnea, cough, chest tightness, shortness of breath and wheezing.    Cardiovascular: Negative for chest pain, palpitations and leg swelling.   Gastrointestinal: Negative for abdominal distention, abdominal pain, anal bleeding, blood in stool, constipation, diarrhea, nausea, rectal pain and vomiting.   Endocrine: Negative for cold intolerance and heat intolerance.   Genitourinary: Negative for difficulty urinating, dysuria, flank pain, scrotal swelling and testicular pain.   Musculoskeletal: Negative for back pain, gait  problem and joint swelling.   Skin: Positive for color change and wound. Negative for rash.   Neurological: Negative for dizziness, syncope, speech difficulty, weakness, numbness and headaches.   Hematological: Negative for adenopathy. Does not bruise/bleed easily.   Psychiatric/Behavioral: Negative for confusion. The patient is not nervous/anxious.        Allergies:  No Known Allergies    Medications:    Current Outpatient Medications:   •  atorvastatin (LIPITOR) 20 MG tablet, atorvastatin 20 mg tablet  TAKE ONE TABLET BY MOUTH DAILY, Disp: , Rfl:   •  busPIRone (BUSPAR) 15 MG tablet, Take 15 mg by mouth 3 (Three) Times a Day., Disp: , Rfl:   •  cephalexin (KEFLEX) 500 MG capsule, cephalexin 500 mg capsule  TAKE ONE CAPSULE BY MOUTH THREE TIMES DAILY, Disp: , Rfl:   •  COVID-19 At Home Antigen Test ([employer] At-Home Covid-19 Test) kit, [employer] At-Home COVID-19 Test kit  USE AS DIRECTED, Disp: , Rfl:   •  diazePAM (Valium) 10 MG tablet, Take 1 tablet by mouth Daily. Take both tablets PO 30 minutes prior to MRI, Disp: 2 tablet, Rfl: 0  •  DULoxetine (CYMBALTA) 30 MG capsule, Take 30 mg by mouth Daily., Disp: , Rfl:   •  DULoxetine (CYMBALTA) 60 MG capsule, Take 60 mg by mouth Daily., Disp: , Rfl:   •  EPINEPHrine (EPIPEN) 0.3 MG/0.3ML solution auto-injector injection, epinephrine 0.3 mg/0.3 mL injection, auto-injector, Disp: , Rfl:   •  gentamicin (GARAMYCIN) 0.1 % ointment, gentamicin 0.1 % topical ointment  Apply 1 application to affected area of skin twice a day., Disp: , Rfl:   •  glimepiride (AMARYL) 4 MG tablet, Take 1 mg by mouth 2 (two) times a day., Disp: , Rfl:   •  hydrOXYzine pamoate (VISTARIL) 25 MG capsule, hydroxyzine pamoate 25 mg capsule, Disp: , Rfl:   •  ibuprofen (ADVIL,MOTRIN) 200 MG tablet, Take 200 mg by mouth 2 (two) times a day. 2 TABLETS, BID, Disp: , Rfl:   •  ipratropium-albuterol (Combivent Respimat)  MCG/ACT inhaler, Combivent Respimat 20 mcg-100 mcg/actuation solution for  inhalation  INHALE 1 PUFF BY MOUTH FOUR TIMES DAILY (DO not exceed SIX inhalations in 24 hours), Disp: , Rfl:   •  levoFLOXacin (LEVAQUIN) 750 MG tablet, Take 1 tablet by mouth Daily., Disp: 7 tablet, Rfl: 0  •  lisinopril (PRINIVIL,ZESTRIL) 40 MG tablet, Take 40 mg by mouth Daily., Disp: , Rfl:   •  Lyrica 200 MG capsule, Take 200 mg by mouth 3 (Three) Times a Day., Disp: , Rfl: 0  •  metFORMIN (GLUCOPHAGE) 1000 MG tablet, Take 500 mg by mouth 2 (Two) Times a Day. 2 tablets, BID, Disp: , Rfl: 3  •  metroNIDAZOLE (FLAGYL) 500 MG tablet, Take 1 tablet by mouth 3 (Three) Times a Day., Disp: 21 tablet, Rfl: 0  •  oxyCODONE (ROXICODONE) 15 MG immediate release tablet, Take 15 mg by mouth 4 (Four) Times a Day As Needed., Disp: , Rfl: 0  •  oxyCODONE (ROXICODONE) 15 MG immediate release tablet, Take 15 mg by mouth., Disp: , Rfl:   •  pioglitazone (ACTOS) 30 MG tablet, Take 30 mg by mouth Daily., Disp: , Rfl:   •  Semaglutide,0.25 or 0.5MG/DOS, (OZEMPIC) 2 MG/1.5ML solution pen-injector, Inject 0.5 mg under the skin into the appropriate area as directed 1 (One) Time Per Week., Disp: , Rfl:     History:  Past Medical History:   Diagnosis Date   • Anxiety and depression    • Arthritis    • Claustrophobia    • Constipation    • Depression    • Diabetes mellitus (HCC)    • Fatty liver    • GERD (gastroesophageal reflux disease)    • High cholesterol    • History of methicillin resistant staphylococcus aureus (MRSA) 2013    RIGHT THIGH   • Hypertension    • Injury of back    • Neuropathy    • Pancreatitis    • Wears glasses        Past Surgical History:   Procedure Laterality Date   • APPENDECTOMY     • BACK SURGERY  2006    L4-5 LAMINECTOMY -  [facility] - Dr. Shah   • CHOLECYSTECTOMY     • FOOT ARTHROPLASTY Left 4/2/2018    Procedure: Left foot Verma arthroplasty, wound debridement and grafting;  Surgeon: Sammy Antoine DPM;  Location: [facility];  Service: Orthopedics   • FOOT IRRIGATION, DEBRIDEMENT AND REPAIR Left  "1/6/2018    Procedure: Left foot irrigation and debridement, wound closure, graft application;  Surgeon: Sammy Antoine DPM;  Location: [facility] OR;  Service:    • INCISION AND DRAINAGE FOOT Left 1/3/2018    Procedure: INCISION AND DRAINAGE FOOT ABSCESS, GREAT TOE;  Surgeon: Sammy Anotine DPM;  Location: [facility] OR;  Service:    • SCROTAL SURGERY     • TOE ARTHROPLASTY Right 6/5/2018    Procedure: Right foot Verma arthroplasty, wound debridement with graft application, phl repair;  Surgeon: Sammy Antoine DPM;  Location: [facility] OR;  Service: Orthopedics   • WISDOM TOOTH EXTRACTION         Family History   Problem Relation Age of Onset   • Diabetes Mother    • COPD Mother    • Arthritis Mother    • Asthma Mother    • Hypertension Mother    • Diabetes Father    • Diabetes Brother    • Diabetes Brother    • No Known Problems Brother        Social History     Tobacco Use   • Smoking status: Every Day     Packs/day: 1.00     Years: 20.00     Pack years: 20.00     Types: Cigarettes   • Smokeless tobacco: Current     Types: Snuff   • Tobacco comments:     20+ YEARS   Vaping Use   • Vaping Use: Never used   Substance Use Topics   • Alcohol use: No   • Drug use: No        OBJECTIVE:    Vital Signs:   Vitals:    12/06/22 1536   BP: 120/64   BP Location: Left arm   Pulse: 104   Resp: 18   Temp: 96 °F (35.6 °C)   TempSrc: Skin   SpO2: 97%   Weight: 118 kg (260 lb)   Height: 185.4 cm (73\")       Physical Exam:   General Appearance:    Alert, cooperative, in no acute distress   Head:    Normocephalic, without obvious abnormality, atraumatic   Eyes:            Lids and lashes normal, conjunctivae and sclerae normal, no   icterus, no pallor, corneas clear, PERRLA   Ears:    Ears appear intact with no abnormalities noted   Throat:   No oral lesions, no thrush, oral mucosa moist   Neck:   No adenopathy, supple, trachea midline, no thyromegaly, no   carotid bruit, no JVD   Lungs:     Clear to auscultation,respirations regular, " even and                  unlabored    Heart:    Regular rhythm and normal rate, normal S1 and S2, no            murmur, no gallop, no rub, no click   Chest Wall:    No abnormalities observed   Abdomen:     Normal bowel sounds, no masses, no organomegaly, soft        non-tender, non-distended, no guarding, no rebound                tenderness   Extremities:   Moves all extremities well, no edema, no cyanosis, no             redness   Pulses:   Pulses palpable and equal bilaterally   Skin:   No bleeding, bruising or rash, perirectal abscess on the left buttock with erythema, slightly open   Lymph nodes:   No palpable adenopathy   Neurologic:   Cranial nerves 2 - 12 grossly intact, sensation intact, DTR       present and equal bilaterally     Results Review:   I reviewed the patient's new clinical results.  I reviewed the patient's new imaging results and agree with the interpretation.  I reviewed the patient's other test results and agree with the interpretation    Review of Systems was reviewed and confirmed as accurate as documented by the MA.    ASSESSMENT/PLAN:    1. Perirectal abscess        Procedure:     I recommended abscess drainage to the patient. I explained the indication as well as the risks and benefits which include bleeding, further infection requiring additional procedures, non healing of the wound etc. The patient understands these and wishes to proceed.      The patient was brought to the procedure room. Consent and time out were performed. The area was prepped and draped in the usual fashion. 1% lidocaine with epinephrine was infused locally. The abscess was then incised and drained sharply with a #11 blade. Purulent contents were evacuated and irrigated with saline and peroxide. Minimal blood loss had occurred and was well controlled with pressure. There were no complications and the patient tolerated the procedure well. Wound instructions were given.  This was complicated in nature and  loculations were broken down.  It was cleansed with peroxide.    Wound care instructions were given, have asked the patient to continue with his current oral antibiotics, I need to see him back in the office next Thursday if he has any further problems.  He clearly knows to call me sooner if he has issues.    I discussed the patients findings and my recommendations with patient        Electronically signed by Rg Meyers MD  12/06/22

## 2022-12-07 LAB
BACTERIA SPEC AEROBE CULT: ABNORMAL
BACTERIA SPEC AEROBE CULT: ABNORMAL
GRAM STN SPEC: ABNORMAL
GRAM STN SPEC: ABNORMAL

## 2022-12-08 NOTE — ED PROVIDER NOTES
Subjective  History of Present Illness:    Chief Complaint: Left buttock abscess  History of Present Illness: 47-year-old male presents above complaint over the last 2 days.  He does have a remote history of necrotizing fasciitis.  No fever no chills no vomiting, minimal drainage.  Onset: 2 days  Duration: Persistent  Exacerbating / Alleviating factors: Worse to touch  Associated symptoms: None      Nurses Notes reviewed and agree, including vitals, allergies, social history and prior medical history.     REVIEW OF SYSTEMS: All systems reviewed and not pertinent unless noted.    Positive for: Left buttock abscess with feeling of swelling in the groin, prior history of necrotizing fasciitis from    Negative for: Fever chills vomiting diarrhea    Past Medical History:   Diagnosis Date   • Anxiety and depression    • Arthritis    • Claustrophobia    • Constipation    • Depression    • Diabetes mellitus (HCC)    • Fatty liver    • GERD (gastroesophageal reflux disease)    • High cholesterol    • History of methicillin resistant staphylococcus aureus (MRSA) 2013    RIGHT THIGH   • Hypertension    • Injury of back    • Neuropathy    • Pancreatitis    • Wears glasses        Allergies:    Patient has no known allergies.      Past Surgical History:   Procedure Laterality Date   • APPENDECTOMY     • BACK SURGERY  2006    L4-5 LAMINECTOMY -  [facility] - Dr. Shah   • CHOLECYSTECTOMY     • FOOT ARTHROPLASTY Left 4/2/2018    Procedure: Left foot Verma arthroplasty, wound debridement and grafting;  Surgeon: Sammy Antoine DPM;  Location: [facility] OR;  Service: Orthopedics   • FOOT IRRIGATION, DEBRIDEMENT AND REPAIR Left 1/6/2018    Procedure: Left foot irrigation and debridement, wound closure, graft application;  Surgeon: Sammy Antoine DPM;  Location: [facility] OR;  Service:    • INCISION AND DRAINAGE FOOT Left 1/3/2018    Procedure: INCISION AND DRAINAGE FOOT ABSCESS, GREAT TOE;  Surgeon: Sammy Antoine DPM;  Location:   "[facility];  Service:    • SCROTAL SURGERY     • TOE ARTHROPLASTY Right 6/5/2018    Procedure: Right foot Verma arthroplasty, wound debridement with graft application, phl repair;  Surgeon: Sammy Antoine DPM;  Location: [facility] OR;  Service: Orthopedics   • WISDOM TOOTH EXTRACTION           Social History     Socioeconomic History   • Marital status:    Tobacco Use   • Smoking status: Every Day     Packs/day: 1.00     Years: 20.00     Pack years: 20.00     Types: Cigarettes   • Smokeless tobacco: Current     Types: Snuff   • Tobacco comments:     20+ YEARS   Vaping Use   • Vaping Use: Never used   Substance and Sexual Activity   • Alcohol use: No   • Drug use: No   • Sexual activity: Defer         Family History   Problem Relation Age of Onset   • Diabetes Mother    • COPD Mother    • Arthritis Mother    • Asthma Mother    • Hypertension Mother    • Diabetes Father    • Diabetes Brother    • Diabetes Brother    • No Known Problems Brother        Objective  Physical Exam:  /82   Pulse 94   Temp 99.3 °F (37.4 °C)   Resp 20   Ht 185.4 cm (73\")   Wt 118 kg (260 lb)   SpO2 98%   BMI 34.30 kg/m²    CONSTITUTIONAL: Well developed, nontoxic 47-year-old male,  in no acute distress.  VITAL SIGNS: per nursing, reviewed and noted  SKIN: exposed skin with 2 cm area of induration medial left buttock.  Minimal soft tissue swelling tracking anteriorly.  No scrotal cellulitis no inguinal lymphadenopathy.  EYES: Grossly EOMI, no icterus  ENT: Normal voice.  Patient maintained wearing a mask throughout patient encounter due to coronavirus pandemic  RESPIRATORY:  No increased work of breathing. No retractions.   CARDIOVASCULAR:  regular rate and rhythm, no murmurs.  Good Peripheral pulses. Good cap refill to extremities.   GI: Abdomen soft, nontender, normal bowel sounds. No hernia. No ascites.  MUSCULOSKELETAL: Tenderness forementioned area of abscess. Full ROM. Strength and tone grossly normal.  no spasms. no neck " "or back tenderness or spasm.   NEUROLOGIC: Alert, oriented x 3. No gross deficits. GCS 15.   PSYCH: appropriate affect.  : no bladder tenderness or distention, no CVA tenderness    Procedures  Incision and drainage: 2 cm left buttock abscess  Consent obtained discussed all risks and benefits, patient elected to continue   Wound prep with chlorhexidine  Injected lidocaine 1% lidocaine 1% field infiltration  incised with 11 blade stab incision 1/2 cm  Drained small amount of purulent drainage and blood  Blunt probe to break up any loculations.  tolerated well, no complications.      ED Course:         Lab Results (last 24 hours)     ** No results found for the last 24 hours. **         Glucose 500   BUN 15   Creatinine 0.84   Sodium 130   Potassium 4.4   Chloride 93   CO2 25.4   Calcium 8.6   Total Protein 7.0   Albumin 4.10   ALT (SGPT) 34   AST (SGOT) 18   Alkaline Phosphatase 87   Total Bilirubin 0.4   Globulin 2.9   A/G Ratio 1.4   BUN/Creatinine Ratio 17.9   Anion Gap 11.6   60.0 mL/min/1.73  Comment: National Kidney Foundation and American Society of Nephrology (ASN) Task Force recommended calculation based on the Chronic Kidney Disease Epidemiology Collaboration (CKD-EPI) equation refit without adjustment for race.\" class=\"_edCourseHoverClick\" id=\"SectionLABS_COLUMN_CONTENT_5_Row_18\" eGFR 109.6                   CBC & Differential   Final result 12/04 0033   WBC 21.33   RBC 5.20   Hemoglobin 16.3   Hematocrit 45.9   MCV 88.3   MCH 31.3   MCHC 35.5   RDW 12.3   RDW-SD 39.9   MPV 10.6   Platelets 236           CLINICAL HISTORY:  buttock abscess,LEFT BUTTOCK ABSCESS POSSIBLY SPREADING NEAR  LEFT GROIN.     COMPARISON:  null     FINDINGS:  CT abdomen and pelvis with contrast     Comparison: None     Findings:     The lung bases are clear.     Tiny left inferior renal cyst.     Cholecystectomy. Abdominal solid organs are unremarkable otherwise. No urolithiasis.     No bowel obstruction, pneumoperitoneum, or " pneumatosis.     Large amount of stool.     Mesenteric vessels are patent.     Moderate urinary bladder distention.     The appendix is not identified.     No acute fracture.     There are enlarged left groin lymph nodes with slight fat induration. Likely reactive.     At the left perineum, and inferior medial buttock subcutaneous tissues there is fat induration and skin thickening with no fluid collection.     IMPRESSION:  IMPRESSION:     1. Left inferomedial buttock cellulitis. No abscess.     2. Left groin prominent lymph nodes are likely reactive. There is associated mild surrounding fat induration in this area as well.     3. Large amount of stool without bowel obstruction.     Authenticated and Electronically Signed by Enrique Petersen MD  on 12/04/2022 02:17:32 AM           Specimen Collected: 12/04/22 02:17 EST Last Resulted: 12/04/22 02:17 EST               [facility]  Patient presents for evaluation above complaint.  Received IV fluids, Levaquin Flagyl for left buttock abscess with I&D at the bedside.  Wound culture pending CT on pelvis reveals a left and referral medial cellulitis without abscess, prominent in left groin lymph nodes likely reactive.  Patient stable is stable for discharge home with supportive care, outpatient follow-up with general surgery, return precautions discussed.  Patient's glucose was elevated at 500, received insulin and fluids with repeat revealing a improvement in glucose.    Final diagnoses:   Left buttock abscess   Hyperglycemia        Sekou Rogers,   12/08/22 0617

## 2023-07-28 ENCOUNTER — HOSPITAL ENCOUNTER (OUTPATIENT)
Dept: MRI IMAGING | Facility: HOSPITAL | Age: 48
Discharge: HOME | End: 2023-07-28
Admitting: PODIATRIST
Payer: MEDICARE

## 2023-07-28 DIAGNOSIS — M86.672 OTHER CHRONIC OSTEOMYELITIS, LEFT ANKLE AND FOOT: ICD-10-CM

## 2023-07-28 PROCEDURE — 73718 MRI LOWER EXTREMITY W/O DYE: CPT

## 2024-06-11 NOTE — PROGRESS NOTES
Caller: Pamela Zavala    Relationship to patient: Self    Best call back number: [phone]    Patient is needing: HURT HER LEG THE OTHER DAY AND NOW ITS REALLY REALLY SWOLLEN. TURNING RED AS WELL. PLEASE CALL PT TO ADVISE.            Continued Stay Note  VERONICA Hernandez     Patient Name: Wai Medeiros  MRN: [id number]  Today's Date: 6/6/2018    Admit Date: 6/3/2018          Discharge Plan     Row Name 06/06/18 1142       Plan    Plan Comments Spoke to pt and wife regarding anticipation of iv ABX and home health From list provided pt choose [facility] Called Home infusion and home health waiting on orders               Discharge Codes    No documentation.       Expected Discharge Date and Time     Expected Discharge Date Expected Discharge Time    Jun 6, 2018             Katt Blood

## 2025-04-05 ENCOUNTER — HOSPITAL ENCOUNTER (EMERGENCY)
Facility: HOSPITAL | Age: 50
Discharge: OTHER INSTITUTION | End: 2025-04-05
Attending: EMERGENCY MEDICINE
Payer: MEDICARE

## 2025-04-05 ENCOUNTER — APPOINTMENT (OUTPATIENT)
Dept: CT IMAGING | Facility: HOSPITAL | Age: 50
End: 2025-04-05
Payer: MEDICARE

## 2025-04-05 VITALS
WEIGHT: 220 LBS | BODY MASS INDEX: 29.8 KG/M2 | TEMPERATURE: 98.5 F | SYSTOLIC BLOOD PRESSURE: 132 MMHG | HEART RATE: 86 BPM | DIASTOLIC BLOOD PRESSURE: 72 MMHG | OXYGEN SATURATION: 97 % | HEIGHT: 72 IN | RESPIRATION RATE: 18 BRPM

## 2025-04-05 DIAGNOSIS — R73.9 HYPERGLYCEMIA: ICD-10-CM

## 2025-04-05 DIAGNOSIS — R29.898 WEAKNESS OF BOTH LOWER EXTREMITIES: Primary | ICD-10-CM

## 2025-04-05 DIAGNOSIS — M54.50 ACUTE BILATERAL LOW BACK PAIN WITHOUT SCIATICA: ICD-10-CM

## 2025-04-05 LAB
ALBUMIN SERPL-MCNC: 4.4 G/DL (ref 3.5–5.2)
ALBUMIN/GLOB SERPL: 1.6 G/DL
ALP SERPL-CCNC: 93 U/L (ref 39–117)
ALT SERPL W P-5'-P-CCNC: 36 U/L (ref 1–41)
ANION GAP SERPL CALCULATED.3IONS-SCNC: 17 MMOL/L (ref 5–15)
AST SERPL-CCNC: 24 U/L (ref 1–40)
BASOPHILS # BLD AUTO: 0.08 10*3/MM3 (ref 0–0.2)
BASOPHILS NFR BLD AUTO: 0.3 % (ref 0–1.5)
BILIRUB SERPL-MCNC: 0.3 MG/DL (ref 0–1.2)
BUN SERPL-MCNC: 23 MG/DL (ref 6–20)
BUN/CREAT SERPL: 26.1 (ref 7–25)
CALCIUM SPEC-SCNC: 9.7 MG/DL (ref 8.6–10.5)
CHLORIDE SERPL-SCNC: 95 MMOL/L (ref 98–107)
CO2 SERPL-SCNC: 19 MMOL/L (ref 22–29)
CREAT SERPL-MCNC: 0.88 MG/DL (ref 0.76–1.27)
CRP SERPL-MCNC: 1.19 MG/DL (ref 0–0.5)
DEPRECATED RDW RBC AUTO: 41.1 FL (ref 37–54)
EGFRCR SERPLBLD CKD-EPI 2021: 106.7 ML/MIN/1.73
EOSINOPHIL # BLD AUTO: 0.03 10*3/MM3 (ref 0–0.4)
EOSINOPHIL NFR BLD AUTO: 0.1 % (ref 0.3–6.2)
ERYTHROCYTE [DISTWIDTH] IN BLOOD BY AUTOMATED COUNT: 12.5 % (ref 12.3–15.4)
ERYTHROCYTE [SEDIMENTATION RATE] IN BLOOD: 16 MM/HR (ref 0–15)
GLOBULIN UR ELPH-MCNC: 2.7 GM/DL
GLUCOSE SERPL-MCNC: 542 MG/DL (ref 65–99)
HCT VFR BLD AUTO: 48 % (ref 37.5–51)
HGB BLD-MCNC: 17.1 G/DL (ref 13–17.7)
IMM GRANULOCYTES # BLD AUTO: 0.16 10*3/MM3 (ref 0–0.05)
IMM GRANULOCYTES NFR BLD AUTO: 0.7 % (ref 0–0.5)
LYMPHOCYTES # BLD AUTO: 2.28 10*3/MM3 (ref 0.7–3.1)
LYMPHOCYTES NFR BLD AUTO: 9.5 % (ref 19.6–45.3)
MCH RBC QN AUTO: 31.7 PG (ref 26.6–33)
MCHC RBC AUTO-ENTMCNC: 35.6 G/DL (ref 31.5–35.7)
MCV RBC AUTO: 89.1 FL (ref 79–97)
MONOCYTES # BLD AUTO: 1.59 10*3/MM3 (ref 0.1–0.9)
MONOCYTES NFR BLD AUTO: 6.6 % (ref 5–12)
NEUTROPHILS NFR BLD AUTO: 19.86 10*3/MM3 (ref 1.7–7)
NEUTROPHILS NFR BLD AUTO: 82.8 % (ref 42.7–76)
NRBC BLD AUTO-RTO: 0 /100 WBC (ref 0–0.2)
PLATELET # BLD AUTO: 257 10*3/MM3 (ref 140–450)
PMV BLD AUTO: 10.4 FL (ref 6–12)
POTASSIUM SERPL-SCNC: 4.8 MMOL/L (ref 3.5–5.2)
PROT SERPL-MCNC: 7.1 G/DL (ref 6–8.5)
RBC # BLD AUTO: 5.39 10*6/MM3 (ref 4.14–5.8)
SODIUM SERPL-SCNC: 131 MMOL/L (ref 136–145)
WBC NRBC COR # BLD AUTO: 24 10*3/MM3 (ref 3.4–10.8)

## 2025-04-05 PROCEDURE — 80053 COMPREHEN METABOLIC PANEL: CPT | Performed by: EMERGENCY MEDICINE

## 2025-04-05 PROCEDURE — 25010000002 CEFEPIME PER 500 MG: Performed by: EMERGENCY MEDICINE

## 2025-04-05 PROCEDURE — 25010000002 HYDROMORPHONE 1 MG/ML SOLUTION: Performed by: EMERGENCY MEDICINE

## 2025-04-05 PROCEDURE — 96375 TX/PRO/DX INJ NEW DRUG ADDON: CPT

## 2025-04-05 PROCEDURE — 85652 RBC SED RATE AUTOMATED: CPT | Performed by: EMERGENCY MEDICINE

## 2025-04-05 PROCEDURE — 72129 CT CHEST SPINE W/DYE: CPT

## 2025-04-05 PROCEDURE — 25010000002 ONDANSETRON PER 1 MG: Performed by: EMERGENCY MEDICINE

## 2025-04-05 PROCEDURE — 72132 CT LUMBAR SPINE W/DYE: CPT

## 2025-04-05 PROCEDURE — 51798 US URINE CAPACITY MEASURE: CPT

## 2025-04-05 PROCEDURE — 99285 EMERGENCY DEPT VISIT HI MDM: CPT | Performed by: EMERGENCY MEDICINE

## 2025-04-05 PROCEDURE — 85025 COMPLETE CBC W/AUTO DIFF WBC: CPT | Performed by: EMERGENCY MEDICINE

## 2025-04-05 PROCEDURE — 63710000001 INSULIN REGULAR HUMAN PER 5 UNITS: Performed by: EMERGENCY MEDICINE

## 2025-04-05 PROCEDURE — 86140 C-REACTIVE PROTEIN: CPT | Performed by: EMERGENCY MEDICINE

## 2025-04-05 PROCEDURE — 25510000001 IOPAMIDOL 61 % SOLUTION: Performed by: EMERGENCY MEDICINE

## 2025-04-05 PROCEDURE — 25810000003 SODIUM CHLORIDE 0.9 % SOLUTION: Performed by: EMERGENCY MEDICINE

## 2025-04-05 PROCEDURE — 96365 THER/PROPH/DIAG IV INF INIT: CPT

## 2025-04-05 RX ORDER — IOPAMIDOL 612 MG/ML
100 INJECTION, SOLUTION INTRAVASCULAR
Status: COMPLETED | OUTPATIENT
Start: 2025-04-05 | End: 2025-04-05

## 2025-04-05 RX ORDER — ONDANSETRON 2 MG/ML
8 INJECTION INTRAMUSCULAR; INTRAVENOUS ONCE
Status: COMPLETED | OUTPATIENT
Start: 2025-04-05 | End: 2025-04-05

## 2025-04-05 RX ADMIN — INSULIN HUMAN 10 UNITS: 100 INJECTION, SOLUTION PARENTERAL at 03:55

## 2025-04-05 RX ADMIN — HYDROMORPHONE HYDROCHLORIDE 1 MG: 1 INJECTION, SOLUTION INTRAMUSCULAR; INTRAVENOUS; SUBCUTANEOUS at 03:09

## 2025-04-05 RX ADMIN — SODIUM CHLORIDE 1000 ML: 9 INJECTION, SOLUTION INTRAVENOUS at 04:28

## 2025-04-05 RX ADMIN — IOPAMIDOL 100 ML: 612 INJECTION, SOLUTION INTRAVENOUS at 04:13

## 2025-04-05 RX ADMIN — CEFEPIME 2000 MG: 2 INJECTION, POWDER, FOR SOLUTION INTRAVENOUS at 04:18

## 2025-04-05 RX ADMIN — ONDANSETRON 8 MG: 2 INJECTION INTRAMUSCULAR; INTRAVENOUS at 03:09

## 2025-04-05 NOTE — ED NOTES
"Patient unable to urinate at this time. Patient states he isn't able to lying down, but has been saying his right leg is \"not working correctly\" so he cannot stand. Instructed by MD after random bladder scan to give him another 30 minutes to an hour to void spontaneously prior to IC patient.  "

## 2025-04-05 NOTE — ED NOTES
Patient now unable to move or feel BLE. Unable to feel pain or sensation in BLE. MD already aware by previous nursing staff. This nurse completed new assessment.

## 2025-04-05 NOTE — ED PROVIDER NOTES
EMERGENCY DEPARTMENT ENCOUNTER    Pt Name: Wai Medeiros  MRN: [id number]  Pt :   1975  Room Number:    Date of encounter:  2025  PCP: Rodríguez Mireles MD  ED Provider: Von Fierro MD    Historian: Patient      HPI:  Chief Complaint: Back pain, leg weakness        Context: Wai Medeiros is a 49 y.o. male who presents to the ED c/o back pain and leg weakness.  Patient is a past history significant for diabetes and chronic back pain.  He says yesterday morning he underwent spine stimulator placement by Dr. Harris at [facility].  He says he was doing well after the surgery until last night at 9:30 PM when he began having spasms in his low back at the site of the stimulator placement with bilateral leg weakness.  He says prior to 9:30 PM he had been ambulating without difficulty and was not in any pain.  He denies having any fever, urinary retention, urinary continence or fecal incontinence.  He says now his right leg weakness has resolved but he is still unable to move his left leg.      PAST MEDICAL HISTORY  Past Medical History:   Diagnosis Date    Anxiety and depression     Arthritis     Arthritis of back     Claustrophobia     Constipation     Depression     Diabetes mellitus     Fatty liver     GERD (gastroesophageal reflux disease)     High cholesterol     History of methicillin resistant staphylococcus aureus (MRSA)     RIGHT THIGH    Hypertension     Injury of back     Low back strain     Lumbosacral disc disease     Neuropathy     Pancreatitis     Wears glasses          PAST SURGICAL HISTORY  Past Surgical History:   Procedure Laterality Date    APPENDECTOMY      BACK SURGERY  2006    L4-5 LAMINECTOMY -  [facility] - Dr. Shah    CHOLECYSTECTOMY      FOOT ARTHROPLASTY Left 2018    Procedure: Left foot Verma arthroplasty, wound debridement and grafting;  Surgeon: Sammy Antoine DPM;  Location: [facility] OR;  Service: Orthopedics    FOOT  IRRIGATION, DEBRIDEMENT AND REPAIR Left 01/06/2018    Procedure: Left foot irrigation and debridement, wound closure, graft application;  Surgeon: Sammy Antoine DPM;  Location: [facility] OR;  Service:     INCISION AND DRAINAGE FOOT Left 01/03/2018    Procedure: INCISION AND DRAINAGE FOOT ABSCESS, GREAT TOE;  Surgeon: Sammy Antoine DPM;  Location: [facility] OR;  Service:     SCROTAL SURGERY      SPINAL CORD STIMULATOR IMPLANT  04/04/2025    @ [facility]    TOE ARTHROPLASTY Right 06/05/2018    Procedure: Right foot Verma arthroplasty, wound debridement with graft application, phl repair;  Surgeon: Sammy Antoine DPM;  Location: [facility] OR;  Service: Orthopedics    WISDOM TOOTH EXTRACTION           FAMILY HISTORY  Family History   Problem Relation Age of Onset    Diabetes Mother     COPD Mother     Arthritis Mother     Asthma Mother     Hypertension Mother     Diabetes Father     Diabetes Brother     Diabetes Brother     No Known Problems Brother          SOCIAL HISTORY  Social History     Socioeconomic History    Marital status:    Tobacco Use    Smoking status: Every Day     Current packs/day: 1.00     Average packs/day: 1 pack/day for 20.0 years (20.0 ttl pk-yrs)     Types: Cigarettes    Smokeless tobacco: Current     Types: Snuff    Tobacco comments:     20+ YEARS   Vaping Use    Vaping status: Never Used   Substance and Sexual Activity    Alcohol use: No    Drug use: No    Sexual activity: Defer         ALLERGIES  Patient has no known allergies.        REVIEW OF SYSTEMS    All systems reviewed and negative except for those discussed in HPI.       PHYSICAL EXAM    I have reviewed the triage vital signs and nursing notes.    ED Triage Vitals   Temp Pulse Resp BP SpO2   -- -- -- -- --      Temp src Heart Rate Source Patient Position BP Location FiO2 (%)   -- -- -- -- --         General: Moderate acute distress  Skin: normal color, warm and dry  Head: normocephalic, atraumatic  Eyes: Pupils equally  round and reactive to light.  Nose: normal nasal mucosa, no visible deformity.  Mouth: moist mucous membranes.  Neck: supple.  Chest: no retractions, no visible deformity  Cardiovascular: Regular rate and rhythm.  Lungs: clear to auscultation bilaterally.  Back: Dressings in place to the mid back.  No surrounding erythema or warmth.  Abdomen: soft, non-tender, non-distended. No rebound tenderness, no guarding.  No peritonitis.  Extremities: Palpable radial pulses bilaterally. Palpable dorsalis pedis pulses bilaterally.  Neuro:  alert and oriented x3, no focal neurological deficits.  0 out of 5 strength of the left lower extremity.  5 out of 5 strength of the right lower extremity.  Patient has intact patellar and Achilles reflexes bilaterally.  Psych:  appropriate mood and behavior.        LAB RESULTS  Recent Results (from the past 24 hours)   Comprehensive Metabolic Panel    Collection Time: 04/05/25  3:05 AM    Specimen: Blood   Result Value Ref Range    Glucose 542 (C) 65 - 99 mg/dL    BUN 23 (H) 6 - 20 mg/dL    Creatinine 0.88 0.76 - 1.27 mg/dL    Sodium 131 (L) 136 - 145 mmol/L    Potassium 4.8 3.5 - 5.2 mmol/L    Chloride 95 (L) 98 - 107 mmol/L    CO2 19.0 (L) 22.0 - 29.0 mmol/L    Calcium 9.7 8.6 - 10.5 mg/dL    Total Protein 7.1 6.0 - 8.5 g/dL    Albumin 4.4 3.5 - 5.2 g/dL    ALT (SGPT) 36 1 - 41 U/L    AST (SGOT) 24 1 - 40 U/L    Alkaline Phosphatase 93 39 - 117 U/L    Total Bilirubin 0.3 0.0 - 1.2 mg/dL    Globulin 2.7 gm/dL    A/G Ratio 1.6 g/dL    BUN/Creatinine Ratio 26.1 (H) 7.0 - 25.0    Anion Gap 17.0 (H) 5.0 - 15.0 mmol/L    eGFR 106.7 >60.0 mL/min/1.73   CBC Auto Differential    Collection Time: 04/05/25  3:05 AM    Specimen: Blood   Result Value Ref Range    WBC 24.00 (H) 3.40 - 10.80 10*3/mm3    RBC 5.39 4.14 - 5.80 10*6/mm3    Hemoglobin 17.1 13.0 - 17.7 g/dL    Hematocrit 48.0 37.5 - 51.0 %    MCV 89.1 79.0 - 97.0 fL    MCH 31.7 26.6 - 33.0 pg    MCHC 35.6 31.5 - 35.7 g/dL    RDW 12.5 12.3 -  15.4 %    RDW-SD 41.1 37.0 - 54.0 fl    MPV 10.4 6.0 - 12.0 fL    Platelets 257 140 - 450 10*3/mm3    Neutrophil % 82.8 (H) 42.7 - 76.0 %    Lymphocyte % 9.5 (L) 19.6 - 45.3 %    Monocyte % 6.6 5.0 - 12.0 %    Eosinophil % 0.1 (L) 0.3 - 6.2 %    Basophil % 0.3 0.0 - 1.5 %    Immature Grans % 0.7 (H) 0.0 - 0.5 %    Neutrophils, Absolute 19.86 (H) 1.70 - 7.00 10*3/mm3    Lymphocytes, Absolute 2.28 0.70 - 3.10 10*3/mm3    Monocytes, Absolute 1.59 (H) 0.10 - 0.90 10*3/mm3    Eosinophils, Absolute 0.03 0.00 - 0.40 10*3/mm3    Basophils, Absolute 0.08 0.00 - 0.20 10*3/mm3    Immature Grans, Absolute 0.16 (H) 0.00 - 0.05 10*3/mm3    nRBC 0.0 0.0 - 0.2 /100 WBC   Sedimentation Rate    Collection Time: 04/05/25  3:05 AM    Specimen: Blood   Result Value Ref Range    Sed Rate 16 (H) 0 - 15 mm/hr   C-reactive Protein    Collection Time: 04/05/25  3:05 AM    Specimen: Blood   Result Value Ref Range    C-Reactive Protein 1.19 (H) 0.00 - 0.50 mg/dL       If labs were ordered, I independently reviewed the results and considered them in treating the patient.  See medical decision making discussion section for my interpretation of lab results.        RADIOLOGY  No Radiology Exams Resulted Within Past 24 Hours        PROCEDURES    Procedures    No orders to display       MEDICATIONS GIVEN IN ER    Medications   HYDROmorphone (DILAUDID) injection 1 mg (1 mg Intravenous Given 4/5/25 8029)   ondansetron (ZOFRAN) injection 8 mg (8 mg Intravenous Given 4/5/25 0719)   insulin regular (humuLIN R,novoLIN R) injection 10 Units (10 Units Intravenous Given 4/5/25 8931)   sodium chloride 0.9 % bolus 1,000 mL (0 mL Intravenous Transferred to External Facility 4/5/25 4846)   cefepime 2000 mg IVPB in 100 mL NS (VTB) (0 mg Intravenous Transferred to External Facility 4/5/25 2252)   iopamidol (ISOVUE-300) 61 % injection 100 mL (100 mL Intravenous Given 4/5/25 8182)         MEDICAL DECISION MAKING, PROGRESS, and CONSULTS    All labs, if obtained, have  been independently reviewed by me.  All radiology studies, if obtained, have been reviewed by me and the radiologist dictating the report.  All EKG's, if obtained, have been independently viewed and interpreted by me/my attending physician.      Discussion below represents my analysis of pertinent findings related to patient's condition, differential diagnosis, treatment plan and final disposition.                         Differential diagnosis:    Differential includes epidural hematoma, epidural abscess, discitis, osteomyelitis, cauda equina syndrome, complication from spinal stimulator, other acute emergency.    Medical Decision Making Discussion:    Vitals reviewed and are normal.    Labs reviewed and show leukocytosis.  Patient has hyperglycemia, bicarb of 19 and anion gap of 17.  This may be early DKA.  Patient has been given IV fluids and IV insulin.  Given significant leukocytosis and postoperative state blood cultures and lactate were ordered in addition to empiric broad-spectrum IV antibiotics with IV cefepime.    Patient initially reported that he had regained strength in his right lower extremity and on my initial physical exam had 5 out of 5 strength about the right lower extremity but 0 out of 5 strength of the left lower extremity.  Shortly thereafter patient called out and stated that he was numb from the waist down and had again lost strength in his right leg.  On repeat examination patient has 0 out of 5 strength about the bilateral lower extremities.  He had absent sensation throughout the bilateral lower extremities.  Rectal examination was performed and he had no perineum sensation or any rectal tone.  Patient also reports he is unable to void.  Bladder scan was obtained which showed 456 mL.    CT imaging of the thoracic and lumbar spines with IV contrast were obtained.    Given his symptoms and physical exam findings concerning for cord compression of uncertain etiology patient requires  transfer to higher level care for spine consultation.  Case was discussed with Dr. Ross at [facility] who accepted the patient for transfer.    45 minutes of critical care provided. This time excludes other billable procedures. Time does include preparation of documents, medical consultations, review of old records, and direct bedside care. Patient is at high risk for life-threatening deterioration due to bilateral leg weakness and numbness, urinary retention, leukocytosis, hyperglycemia.      Additional sources:    - Discussed/ obtained information from independent historians: Wife    - External (non-ED) record review: Family medicine note from March 31, 2025 documenting history of diabetes, hyperlipidemia and hypertension.    - Chronic or social conditions impacting care: Diabetes    Shared Decision Making:  After my consideration of clinical presentation and any laboratory/radiology studies obtained, I discussed the findings with the patient/patient representative who is in agreement with the treatment plan and the final disposition.   Risks and benefits of discharge and/or observation/admission were discussed.    Orders placed during this visit:  Orders Placed This Encounter   Procedures    Blood Culture - Blood,    Blood Culture - Blood,    CT Lumbar Spine With Contrast    CT Thoracic Spine With Contrast    Comprehensive Metabolic Panel    CBC Auto Differential    Sedimentation Rate    C-reactive Protein    Urinalysis With Microscopic If Indicated (No Culture) - Urine, Clean Catch    Lactic Acid, Plasma    Blood Gas, Venous -With Co-Ox Panel: Yes    Measure post void residual       AS OF 05:04 EDT VITALS:    BP - 132/72  HR - 86  TEMP - 98.5 °F (36.9 °C) (Oral)  O2 SATS - 97%                  DIAGNOSIS  Final diagnoses:   Weakness of both lower extremities   Acute bilateral low back pain without sciatica   Hyperglycemia         DISPOSITION  Transfer to  emergency department      Please  note that portions of this document were completed with voice recognition software.        Von Fierro MD  04/05/25 0568

## 2025-04-05 NOTE — ED NOTES
"Pt Called out stating he can no longer feel his legs from waste down and can't feel his\"pee bird\" provider aware and at bedside  "

## (undated) DEVICE — SPNG GZ WOVN 4X4IN 12PLY 10/BX STRL

## (undated) DEVICE — BANDAGE,GAUZE,BULKEE II,4.5"X4.1YD,STRL: Brand: MEDLINE

## (undated) DEVICE — THIN OFFSET (9.0 X 0.38 X 25.0MM)

## (undated) DEVICE — DRSNG WND GZ CURAD OIL EMULSION 3X3IN STRL

## (undated) DEVICE — SMARTGOWN SURGICAL GOWN, 2XL: Brand: CONVERTORS

## (undated) DEVICE — FLEXIBLE YANKAUER,MEDIUM TIP, NO VACUUM CONTROL: Brand: ARGYLE

## (undated) DEVICE — BNDG ESMARK 6INX9FT STRL

## (undated) DEVICE — PK EXTRM LOWR 20

## (undated) DEVICE — SUT VIC 2/0 SH 27IN

## (undated) DEVICE — CLAVICLE STRAP: Brand: DEROYAL

## (undated) DEVICE — BNDG ELAS W/CLIP 4IN 5YD LF STRL

## (undated) DEVICE — 1000 S-DRAPE TOWEL DRAPE 10/BX: Brand: STERI-DRAPE™

## (undated) DEVICE — BNDG ELAS ELITE V/CLOSE 4IN 5YD LF STRL

## (undated) DEVICE — ST IRR CYSTO W/SPK 77IN LF

## (undated) DEVICE — SINGLE PORT MANIFOLD: Brand: NEPTUNE 2

## (undated) DEVICE — SUT VIC 3/0 SH 27IN J416H

## (undated) DEVICE — BNDG ELAS CO-FLEX SLF ADHR 4IN5YD LF STRL

## (undated) DEVICE — BNDG ESMARK 4IN 9FT LF STRL BLU

## (undated) DEVICE — CUFF SCD HEMOFORCE SEQ CALF STD MD

## (undated) DEVICE — TRY SKINPREP PVP SCRB W PAINT

## (undated) DEVICE — BNDG ELAS SLF ADHR 1IN 5YD COLR PK LF

## (undated) DEVICE — GLV SURG BIOGEL PI ULTRATOUCH G SZ7.5 LF

## (undated) DEVICE — NON-ADHERENT STRIPS,OIL EMULSION: Brand: CURITY

## (undated) DEVICE — DISPOSABLE TOURNIQUET CUFF SINGLE BLADDER, SINGLE PORT AND LUER LOCK CONNECTOR: Brand: COLOR CUFF

## (undated) DEVICE — SOL IRR NACL 0.9PCT 3000ML

## (undated) DEVICE — GLV SURG SENSICARE GREEN W/ALOE PF LF 8 STRL

## (undated) DEVICE — SOL NACL 0.9PCT 1000ML

## (undated) DEVICE — DRSNG ADAPTIC 3X8

## (undated) DEVICE — STRIP PACKING W IODOFORM 1/4

## (undated) DEVICE — GAUZE,SPONGE,FLUFF,6"X6.75",STRL,10/TRAY: Brand: MEDLINE

## (undated) DEVICE — SUT ETHLN 3/0 PS2 18 IN 1669H

## (undated) DEVICE — SOL IRR NACL 0.9PCT BT 1000ML

## (undated) DEVICE — BNDG ELAS MATRX V/CLS 6IN 5YD LF

## (undated) DEVICE — SUT ETHLN 3/0 FS1 663G

## (undated) DEVICE — GOWN,SIRUS,NON REINFRCD,LARGE,SET IN SL: Brand: MEDLINE

## (undated) DEVICE — BNDG ELAS MATRX V/CLS 4IN 5YD LF

## (undated) DEVICE — NDL HYPO ECLPS SFTY 18G 1 1/2IN